# Patient Record
Sex: MALE | Race: WHITE | URBAN - METROPOLITAN AREA
[De-identification: names, ages, dates, MRNs, and addresses within clinical notes are randomized per-mention and may not be internally consistent; named-entity substitution may affect disease eponyms.]

---

## 2017-02-14 ENCOUNTER — TRANSFERRED RECORDS (OUTPATIENT)
Dept: HEALTH INFORMATION MANAGEMENT | Facility: CLINIC | Age: 47
End: 2017-02-14

## 2017-02-24 ENCOUNTER — DOCUMENTATION ONLY (OUTPATIENT)
Dept: GASTROENTEROLOGY | Facility: CLINIC | Age: 47
End: 2017-02-24

## 2017-02-24 NOTE — PROGRESS NOTES
[facility]  Transfer Triage Note    Date of call: February 24, 2017  Time of call: 8:08 AM    Reason for Transfer: Procedure can be done here and not at referring hospital    Diagnosis: Necrotizing pancreatitis c/b sepsis, peritonitis, JUAN     Outside Records: Not available  Additional records requested to be faxed to [phone].    Stability of Patient: Patient is at risk for instability, however patient requires urgent transfer and does not meet ICU criteria     Expected Time of Arrival for Transfer: 0-8 hours    Recommendations for Management and Stabilization: Given    Additional Comments:  46-year-old man with a history of gallstone pancreatitis (initial episode 10/2016) c/b necrosis, complex fluid collections. Please see triage notes from Javier Silveira and Dejuan from 2/15 and 2/17 for additional details. The patient has been in/out of the hospital, most recently admitted on 2/14 in [place]. The case was reviewed with [facility] last week. On the initial triage call (2/15), the decision was made to keep the patient in [place] as it did not seem the fluid collections were amenable to intervention. However, in recent days, the patient's clinical status has deteriorated. He is newly febrile with a rising WBC count. Still spiking fevers while on meropenem. Recent diagnostic paracentesis showed evidence of peritonitis. I spoke with the [facility] hospitalist (Dr. Curtis) to discuss transfer. Currently, the patient is febrile with HR 110s, SBP 130s. He is stable on 2L NC. There was some initial IVF resuscitation but this was stopped in the past day due to concern for volume overload and pulmonary edema. The patient received a dose of lasix yesterday for this reason.     I also discussed the case with Dr. Casanova this morning. He will need to review the most recent imaging in order to decide whether an endoscopic intervention is indicated. I called Dr. Curtis back; he will be  pushing the most recent imaging to our PACS system at which time Dr. Casanova will review the case and make a decision.     This morning it was also discovered that the patient does not have health insurance and is on a self-pay status. We attempted a financial override but were informed that a hospitalization at [facility] would not be covered by insurance. The patient also is above the threshold for medicaid.     Case was discussed with Dr. Coy (AOD). Plan is for images to be pushed, Dr. Casanova to review them, then a decision to be made as to what additional interventions could be performed here at [facility]. If there is something we can offer the patient outside of the care provided at his current hospital we would accept the patient.        Ferny Mayes MD

## 2017-02-24 NOTE — PROGRESS NOTES
Computed tomography imaging from 2/23 reviewed. Demonstrates ill-formed necrotizing pancreatitis with multifocal immature collections of fat and pancreatic necrosis. Some areas also demonstrate small foci of air suspicious for infection. Moreover, there is impressive ascites, which I was told was infected though I am not sure if this is rich in amylase. The current physicians note that the patient is febrile and has a leukocytosis despite broad spectrum antibiotics. Apparently, the patient is not hypotense or tachycardic.    If he was an inpatient in our facility, it seems as if he would be on the floor, receiving similar care as he is currently with the exception that we would tap his ascites dry. We are, however, at the cusp of needing to drain the larger pockets of necrosis, likely by multiple percutaneous drains by interventional radiology +/- endoluminal drainage. He would be fed by the NJ. Per report ARF is improving and hes not ventilated.    In summary then, the need to transfer is a difficult decision, however as he is teetering on decompensation (per report), though not actively decompensating, it would be reasonable and prudent to transfer (with the limited data in hand). That said, hindsight may prove that a transfer was not required.    Serafin Casanova MD PhD   of Medicine    [facility]  Division of Gastroenterology and Hepatology  Interventional and Therapeutic Endoscopy  [facility] [address]  [address] [address]    Office [phone]  Fax [phone]

## 2017-02-25 ENCOUNTER — HOSPITAL ENCOUNTER (INPATIENT)
Facility: CLINIC | Age: 47
LOS: 21 days | Discharge: HOME | DRG: 871 | End: 2017-03-18
Attending: INTERNAL MEDICINE | Admitting: HOSPITALIST

## 2017-02-25 DIAGNOSIS — K85.90 ACUTE PANCREATITIS, UNSPECIFIED COMPLICATION STATUS, UNSPECIFIED PANCREATITIS TYPE: Primary | ICD-10-CM

## 2017-02-25 DIAGNOSIS — G47.01 INSOMNIA DUE TO MEDICAL CONDITION: ICD-10-CM

## 2017-02-25 DIAGNOSIS — F32.0 MILD SINGLE CURRENT EPISODE OF MAJOR DEPRESSIVE DISORDER (H): ICD-10-CM

## 2017-02-25 LAB
ALBUMIN SERPL-MCNC: 1.4 G/DL (ref 3.4–5)
ALP SERPL-CCNC: 90 U/L (ref 40–150)
ALT SERPL W P-5'-P-CCNC: 25 U/L (ref 0–70)
ANION GAP SERPL CALCULATED.3IONS-SCNC: 8 MMOL/L (ref 3–14)
AST SERPL W P-5'-P-CCNC: 30 U/L (ref 0–45)
BILIRUB SERPL-MCNC: 0.4 MG/DL (ref 0.2–1.3)
BUN SERPL-MCNC: 33 MG/DL (ref 7–30)
CALCIUM SERPL-MCNC: 7.1 MG/DL (ref 8.5–10.1)
CHLORIDE SERPL-SCNC: 107 MMOL/L (ref 94–109)
CO2 SERPL-SCNC: 28 MMOL/L (ref 20–32)
CREAT SERPL-MCNC: 1.84 MG/DL (ref 0.66–1.25)
CRP SERPL-MCNC: 180 MG/L (ref 0–8)
ERYTHROCYTE [DISTWIDTH] IN BLOOD BY AUTOMATED COUNT: 16.3 % (ref 10–15)
GFR SERPL CREATININE-BSD FRML MDRD: 40 ML/MIN/1.7M2
GLUCOSE SERPL-MCNC: 95 MG/DL (ref 70–99)
HCT VFR BLD AUTO: 29.9 % (ref 40–53)
HGB BLD-MCNC: 9.7 G/DL (ref 13.3–17.7)
MCH RBC QN AUTO: 26.7 PG (ref 26.5–33)
MCHC RBC AUTO-ENTMCNC: 32.4 G/DL (ref 31.5–36.5)
MCV RBC AUTO: 82 FL (ref 78–100)
PLATELET # BLD AUTO: 449 10E9/L (ref 150–450)
POTASSIUM SERPL-SCNC: 3.8 MMOL/L (ref 3.4–5.3)
PROT SERPL-MCNC: 5.6 G/DL (ref 6.8–8.8)
RBC # BLD AUTO: 3.63 10E12/L (ref 4.4–5.9)
SODIUM SERPL-SCNC: 142 MMOL/L (ref 133–144)
WBC # BLD AUTO: 25.3 10E9/L (ref 4–11)

## 2017-02-25 PROCEDURE — 36415 COLL VENOUS BLD VENIPUNCTURE: CPT | Performed by: STUDENT IN AN ORGANIZED HEALTH CARE EDUCATION/TRAINING PROGRAM

## 2017-02-25 PROCEDURE — 84145 PROCALCITONIN (PCT): CPT | Performed by: HOSPITALIST

## 2017-02-25 PROCEDURE — 12000006 ZZH R&B IMCU INTERMEDIATE UMMC

## 2017-02-25 PROCEDURE — 36415 COLL VENOUS BLD VENIPUNCTURE: CPT | Performed by: HOSPITALIST

## 2017-02-25 PROCEDURE — 86140 C-REACTIVE PROTEIN: CPT | Performed by: HOSPITALIST

## 2017-02-25 PROCEDURE — 85027 COMPLETE CBC AUTOMATED: CPT | Performed by: HOSPITALIST

## 2017-02-25 PROCEDURE — 25000128 H RX IP 250 OP 636: Performed by: STUDENT IN AN ORGANIZED HEALTH CARE EDUCATION/TRAINING PROGRAM

## 2017-02-25 PROCEDURE — 87040 BLOOD CULTURE FOR BACTERIA: CPT | Performed by: STUDENT IN AN ORGANIZED HEALTH CARE EDUCATION/TRAINING PROGRAM

## 2017-02-25 PROCEDURE — 82150 ASSAY OF AMYLASE: CPT | Performed by: HOSPITALIST

## 2017-02-25 PROCEDURE — 80053 COMPREHEN METABOLIC PANEL: CPT | Performed by: HOSPITALIST

## 2017-02-25 PROCEDURE — 83690 ASSAY OF LIPASE: CPT | Performed by: HOSPITALIST

## 2017-02-25 PROCEDURE — 86140 C-REACTIVE PROTEIN: CPT | Performed by: STUDENT IN AN ORGANIZED HEALTH CARE EDUCATION/TRAINING PROGRAM

## 2017-02-25 PROCEDURE — 85610 PROTHROMBIN TIME: CPT | Performed by: STUDENT IN AN ORGANIZED HEALTH CARE EDUCATION/TRAINING PROGRAM

## 2017-02-25 PROCEDURE — 83605 ASSAY OF LACTIC ACID: CPT | Performed by: HOSPITALIST

## 2017-02-25 RX ORDER — NALOXONE HYDROCHLORIDE 0.4 MG/ML
.1-.4 INJECTION, SOLUTION INTRAMUSCULAR; INTRAVENOUS; SUBCUTANEOUS
Status: DISCONTINUED | OUTPATIENT
Start: 2017-02-25 | End: 2017-03-18 | Stop reason: HOSPADM

## 2017-02-25 RX ORDER — SODIUM CHLORIDE 9 MG/ML
INJECTION, SOLUTION INTRAVENOUS CONTINUOUS
Status: DISCONTINUED | OUTPATIENT
Start: 2017-02-25 | End: 2017-02-27

## 2017-02-25 RX ORDER — MEROPENEM 1 G/1
1 INJECTION, POWDER, FOR SOLUTION INTRAVENOUS EVERY 8 HOURS
Status: DISCONTINUED | OUTPATIENT
Start: 2017-02-25 | End: 2017-02-28

## 2017-02-25 RX ORDER — OXYCODONE AND ACETAMINOPHEN 5; 325 MG/1; MG/1
1 TABLET ORAL EVERY 4 HOURS PRN
Status: DISCONTINUED | OUTPATIENT
Start: 2017-02-25 | End: 2017-03-05

## 2017-02-25 RX ORDER — FUROSEMIDE 10 MG/ML
20 INJECTION INTRAMUSCULAR; INTRAVENOUS ONCE
Status: DISCONTINUED | OUTPATIENT
Start: 2017-02-25 | End: 2017-02-25

## 2017-02-25 RX ORDER — HYDROMORPHONE HYDROCHLORIDE 1 MG/ML
.5-1 INJECTION, SOLUTION INTRAMUSCULAR; INTRAVENOUS; SUBCUTANEOUS
Status: DISCONTINUED | OUTPATIENT
Start: 2017-02-25 | End: 2017-03-05

## 2017-02-25 RX ORDER — LANOLIN ALCOHOL/MO/W.PET/CERES
3 CREAM (GRAM) TOPICAL
Status: DISCONTINUED | OUTPATIENT
Start: 2017-02-25 | End: 2017-03-03

## 2017-02-25 RX ORDER — FUROSEMIDE 10 MG/ML
20 INJECTION INTRAMUSCULAR; INTRAVENOUS DAILY
Status: DISCONTINUED | OUTPATIENT
Start: 2017-02-26 | End: 2017-02-26

## 2017-02-25 RX ADMIN — SODIUM CHLORIDE: 9 INJECTION, SOLUTION INTRAVENOUS at 23:49

## 2017-02-25 ASSESSMENT — PAIN DESCRIPTION - DESCRIPTORS: DESCRIPTORS: PRESSURE

## 2017-02-25 NOTE — IP AVS SNAPSHOT
[address] [address]    Phone:  [phone]                                       After Visit Summary   2/25/2017    Jayson Bella    MRN: [id number]           After Visit Summary Signature Page     I have received my discharge instructions, and my questions have been answered. I have discussed any challenges I see with this plan with the nurse or doctor.    ..........................................................................................................................................  Patient/Patient Representative Signature      ..........................................................................................................................................  Patient Representative Print Name and Relationship to Patient    ..................................................               ................................................  Date                                            Time    ..........................................................................................................................................  Reviewed by Signature/Title    ...................................................              ..............................................  Date                                                            Time

## 2017-02-25 NOTE — IP AVS SNAPSHOT
MRN:[id number]                      After Visit Summary   2/25/2017    Jayson Bella    MRN: [id number]           Thank you!     Thank you for choosing [facility] for your care. Our goal is always to provide you with excellent care. Hearing back from our patients is one way we can continue to improve our services. Please take a few minutes to complete the written survey that you may receive in the mail after you visit with us. Thank you!        Patient Information     Date Of Birth          1970        About your hospital stay     You were admitted on:  February 25, 2017 You last received care in the:  [address] [facility]    You were discharged on:  March 18, 2017        Reason for your hospital stay       You were in the hospital for necrotizing pancreatitis.                  Who to Call     For medical emergencies, please call 911.  For non-urgent questions about your medical care, please call your primary care provider or clinic, [phone]  For questions related to your surgery, please call your surgery clinic        Attending Provider     Provider Specialty    Avelino Cm MD Internal Medicine    Emilia Rose MD Internal Medicine    Leighton, MD Yovany Internal Medicine    Belia Bell MD Internal Medicine       Primary Care Provider Office Phone # Fax #    Frkhrz Tay [phone]       [facility] [address] [address]         When to contact your care team       Return to the emergency room if you have fever, chills, pain, vomiting,  bloody stools or vomiting blood.                  After Care Instructions     Activity       Your activity upon discharge: activity as tolerated and no driving for today            Diet       Follow this diet upon discharge: Orders Placed This Encounter      Calorie Counts      Snacks/Supplements Adult: Ensure Clear; Between Meals      Snacks/Supplements Adult: Magic Cup; Between Meals      Low Fat Diet    "         Monitor and record       Please keep a log of your caloric intake.                  Follow-up Appointments     Adult [facility] Follow-up and recommended labs and tests       Follow up for repeat necrosectomy on Tuesday, March 21.    Appointments on [place] and/or [facility] (with [facility] or [facility] provider or service). Call [phone] if you haven't heard regarding these appointments within 7 days of discharge.                  Your next 10 appointments already scheduled     Mar 21, 2017   Procedure with Pawel Lamb MD   [facility], [place], Same Day Surgery (--)    [address]  [place] [address] [phone]              Pending Results     Date and Time Order Name Status Description    3/18/2017 0702 Basic metabolic panel In process     3/14/2017 1437 IR Visceral Angiogram Preliminary     3/2/2017 0928 Fungus Culture, non-blood Preliminary             Statement of Approval     Ordered          03/18/17 1205  I have reviewed and agree with all the recommendations and orders detailed in this document.  EFFECTIVE NOW     Approved and electronically signed by:  Elizabeth Rivera MD             Admission Information     Date & Time Provider Department Dept. Phone    2/25/2017 Belia Bell MD [address] [facility] [place] [phone]      Your Vitals Were     Blood Pressure Pulse Temperature Respirations Height Weight    147/91 83 98.5  F (36.9  C) (Oral) 18 1.905 m (6' 3\") 101.7 kg (224 lb 3.3 oz)    Pulse Oximetry BMI (Body Mass Index)                97% 28.02 kg/m2          MyChart Information     [facility] lets you send messages to your doctor, view your test results, renew your prescriptions, schedule appointments and more. To sign up, go to [email or web address] . Click on \"Log in\" on the left side of the screen, which will take you to the Welcome page. Then click on \"Sign up Now\" on the right side of the page.     You will be asked to enter the access code listed below, as well as some " personal information. Please follow the directions to create your username and password.     Your access code is: [email or web address]  Expires: 2017  3:22 PM     Your access code will  in 90 days. If you need help or a new code, please call your [place] clinic or [phone].        Care EveryWhere ID     This is your Care EveryWhere ID. This could be used by other organizations to access your [place] medical records  [id number]           Review of your medicines      START taking        Dose / Directions    acetaminophen 500 MG tablet   Commonly known as:  TYLENOL        Dose:  500-1000 mg   Take 1-2 tablets (500-1,000 mg) by mouth every 6 hours as needed for mild pain or fever   Quantity:  30 tablet   Refills:  1       amylase-lipase-protease 78719 UNITS Cpep per EC capsule   Commonly known as:  CREON 24        Dose:  3 capsule   Take 3 capsules (72,000 Units) by mouth 3 times daily (with meals)   Quantity:  180 capsule   Refills:  3       ciprofloxacin 500 MG tablet   Commonly known as:  CIPRO   Indication:  Infection Within the Abdomen        Dose:  500 mg   Take 1 tablet (500 mg) by mouth 2 times daily   Quantity:  14 tablet   Refills:  0       fluconazole 200 MG tablet   Commonly known as:  DIFLUCAN   Indication:  Presence of Candida Fungus in the Blood        Dose:  200 mg   Take 1 tablet (200 mg) by mouth daily   Quantity:  7 tablet   Refills:  0       linezolid 600 MG tablet   Commonly known as:  ZYVOX   Indication:  Infection Within the Abdomen        Dose:  600 mg   Take 1 tablet (600 mg) by mouth every 12 hours   Quantity:  14 tablet   Refills:  0       melatonin 3 MG tablet   Used for:  Insomnia due to medical condition        Dose:  3 mg   Take 1 tablet (3 mg) by mouth At Bedtime   Quantity:  30 tablet   Refills:  1       metroNIDAZOLE 500 MG tablet   Commonly known as:  FLAGYL   Indication:  Infection Within the Abdomen        Dose:  500 mg   Take 1 tablet (500 mg) by mouth 3 times daily    Quantity:  21 tablet   Refills:  0       mirtazapine 7.5 MG Tabs tablet   Commonly known as:  REMERON        Dose:  7.5 mg   Take 1 tablet (7.5 mg) by mouth At Bedtime   Quantity:  30 tablet   Refills:  3       multivitamin, therapeutic with minerals Tabs tablet        Dose:  1 tablet   Take 1 tablet by mouth daily   Quantity:  30 each   Refills:  3       ondansetron 4 MG tablet   Commonly known as:  ZOFRAN        Dose:  4 mg   Take 1 tablet (4 mg) by mouth every 6 hours as needed for nausea or vomiting   Quantity:  20 tablet   Refills:  1       pantoprazole 40 MG EC tablet   Commonly known as:  PROTONIX        Dose:  40 mg   Take 1 tablet (40 mg) by mouth 2 times daily (before meals)   Quantity:  30 tablet   Refills:  1            Where to get your medicines      These medications were sent to [facility] [place], [place] - [address] [address]     Phone:  [phone]     acetaminophen 500 MG tablet    amylase-lipase-protease 35789 UNITS Cpep per EC capsule    ciprofloxacin 500 MG tablet    fluconazole 200 MG tablet    linezolid 600 MG tablet    melatonin 3 MG tablet    metroNIDAZOLE 500 MG tablet    mirtazapine 7.5 MG Tabs tablet    multivitamin, therapeutic with minerals Tabs tablet    ondansetron 4 MG tablet    pantoprazole 40 MG EC tablet                Protect others around you: Learn how to safely use, store and throw away your medicines at www.disposemymeds.org.             Medication List: This is a list of all your medications and when to take them. Check marks below indicate your daily home schedule. Keep this list as a reference.      Medications           Morning Afternoon Evening Bedtime As Needed    acetaminophen 500 MG tablet   Commonly known as:  TYLENOL   Take 1-2 tablets (500-1,000 mg) by mouth every 6 hours as needed for mild pain or fever   Last time this was given:  1,000 mg on 3/18/2017 11:11 AM                            As needed  for mild pain or fever.       amylase-lipase-protease 53556 UNITS Cpep per EC capsule   Commonly known as:  CREON 24   Take 3 capsules (72,000 Units) by mouth 3 times daily (with meals)   Last time this was given:  72,000 Units on 3/17/2017  9:13 AM            With meals       With meals       With meals               ciprofloxacin 500 MG tablet   Commonly known as:  CIPRO   Take 1 tablet (500 mg) by mouth 2 times daily   Last time this was given:  500 mg on 3/18/2017  8:44 AM            8 AM           8 PM               fluconazole 200 MG tablet   Commonly known as:  DIFLUCAN   Take 1 tablet (200 mg) by mouth daily   Last time this was given:  200 mg on 3/18/2017  8:44 AM            8 AM                       linezolid 600 MG tablet   Commonly known as:  ZYVOX   Take 1 tablet (600 mg) by mouth every 12 hours   Last time this was given:  600 mg on 3/18/2017  8:44 AM            8 AM           8 PM               melatonin 3 MG tablet   Take 1 tablet (3 mg) by mouth At Bedtime   Last time this was given:  3 mg on 3/17/2017  8:12 PM                        10 PM           metroNIDAZOLE 500 MG tablet   Commonly known as:  FLAGYL   Take 1 tablet (500 mg) by mouth 3 times daily   Last time this was given:  500 mg on 3/18/2017  7:07 AM            6 AM       2 PM           10 PM           mirtazapine 7.5 MG Tabs tablet   Commonly known as:  REMERON   Take 1 tablet (7.5 mg) by mouth At Bedtime   Last time this was given:  7.5 mg on 3/17/2017 10:08 PM                        10 PM           multivitamin, therapeutic with minerals Tabs tablet   Take 1 tablet by mouth daily   Last time this was given:  1 tablet on 3/17/2017 10:08 PM                        10 PM           ondansetron 4 MG tablet   Commonly known as:  ZOFRAN   Take 1 tablet (4 mg) by mouth every 6 hours as needed for nausea or vomiting   Last time this was given:  4 mg on 3/14/2017  4:58 AM                            As needed for nausea.       pantoprazole 40 MG EC  tablet   Commonly known as:  PROTONIX   Take 1 tablet (40 mg) by mouth 2 times daily (before meals)   Last time this was given:  40 mg on 3/18/2017  8:44 AM            8 AM (before breakfast)           4 PM (before dinner)

## 2017-02-25 NOTE — PROGRESS NOTES
Medicine Triage Note    Updates:    Please refer to earlier note from 2/24 and 2/25 for details about this patient. Patient continues to be febrile and his leukocytosis is worsening. Triage line notified me that Dr. Quevedo from  had accepted this patient to be transferred here for further cares.  Requested step down unit.    Yovany CORDOBA MD

## 2017-02-26 ENCOUNTER — APPOINTMENT (OUTPATIENT)
Dept: ULTRASOUND IMAGING | Facility: CLINIC | Age: 47
DRG: 871 | End: 2017-02-26
Attending: HOSPITALIST

## 2017-02-26 ENCOUNTER — APPOINTMENT (OUTPATIENT)
Dept: GENERAL RADIOLOGY | Facility: CLINIC | Age: 47
DRG: 871 | End: 2017-02-26
Attending: HOSPITALIST

## 2017-02-26 ENCOUNTER — APPOINTMENT (OUTPATIENT)
Dept: PHYSICAL THERAPY | Facility: CLINIC | Age: 47
DRG: 871 | End: 2017-02-26
Attending: HOSPITALIST

## 2017-02-26 ENCOUNTER — APPOINTMENT (OUTPATIENT)
Dept: GENERAL RADIOLOGY | Facility: CLINIC | Age: 47
DRG: 871 | End: 2017-02-26
Attending: INTERNAL MEDICINE

## 2017-02-26 LAB
ALBUMIN UR-MCNC: 30 MG/DL
AMORPH CRY #/AREA URNS HPF: ABNORMAL /HPF
AMYLASE SERPL-CCNC: 577 U/L (ref 30–110)
ANION GAP SERPL CALCULATED.3IONS-SCNC: 5 MMOL/L (ref 3–14)
APPEARANCE UR: ABNORMAL
BACTERIA SPEC CULT: NORMAL
BACTERIA SPEC CULT: NORMAL
BILIRUB UR QL STRIP: NEGATIVE
BUN SERPL-MCNC: 33 MG/DL (ref 7–30)
CALCIUM SERPL-MCNC: 7.2 MG/DL (ref 8.5–10.1)
CHLORIDE SERPL-SCNC: 107 MMOL/L (ref 94–109)
CO2 SERPL-SCNC: 29 MMOL/L (ref 20–32)
COLOR UR AUTO: YELLOW
CREAT SERPL-MCNC: 1.83 MG/DL (ref 0.66–1.25)
ERYTHROCYTE [DISTWIDTH] IN BLOOD BY AUTOMATED COUNT: 16.6 % (ref 10–15)
GFR SERPL CREATININE-BSD FRML MDRD: 40 ML/MIN/1.7M2
GLUCOSE SERPL-MCNC: 87 MG/DL (ref 70–99)
GLUCOSE UR STRIP-MCNC: NEGATIVE MG/DL
HCT VFR BLD AUTO: 28 % (ref 40–53)
HGB BLD-MCNC: 8.7 G/DL (ref 13.3–17.7)
HGB UR QL STRIP: ABNORMAL
KETONES UR STRIP-MCNC: NEGATIVE MG/DL
LACTATE BLD-SCNC: 0.6 MMOL/L (ref 0.7–2.1)
LEUKOCYTE ESTERASE UR QL STRIP: NEGATIVE
LIPASE SERPL-CCNC: 1070 U/L (ref 73–393)
MAGNESIUM SERPL-MCNC: 1.9 MG/DL (ref 1.6–2.3)
MCH RBC QN AUTO: 26 PG (ref 26.5–33)
MCHC RBC AUTO-ENTMCNC: 31.1 G/DL (ref 31.5–36.5)
MCV RBC AUTO: 84 FL (ref 78–100)
MICRO REPORT STATUS: NORMAL
MICRO REPORT STATUS: NORMAL
MUCOUS THREADS #/AREA URNS LPF: PRESENT /LPF
NITRATE UR QL: NEGATIVE
PH UR STRIP: 5.5 PH (ref 5–7)
PHOSPHATE SERPL-MCNC: 3.9 MG/DL (ref 2.5–4.5)
PLATELET # BLD AUTO: 489 10E9/L (ref 150–450)
POTASSIUM SERPL-SCNC: 3.9 MMOL/L (ref 3.4–5.3)
PROCALCITONIN SERPL-MCNC: 0.43 NG/ML
RBC # BLD AUTO: 3.34 10E12/L (ref 4.4–5.9)
RBC #/AREA URNS AUTO: 9 /HPF (ref 0–2)
SODIUM SERPL-SCNC: 141 MMOL/L (ref 133–144)
SP GR UR STRIP: 1.02 (ref 1–1.03)
SPECIMEN SOURCE: NORMAL
SPECIMEN SOURCE: NORMAL
SQUAMOUS #/AREA URNS AUTO: <1 /HPF (ref 0–1)
URN SPEC COLLECT METH UR: ABNORMAL
UROBILINOGEN UR STRIP-MCNC: 2 MG/DL (ref 0–2)
WBC # BLD AUTO: 21 10E9/L (ref 4–11)
WBC #/AREA URNS AUTO: 9 /HPF (ref 0–2)

## 2017-02-26 PROCEDURE — 84100 ASSAY OF PHOSPHORUS: CPT | Performed by: STUDENT IN AN ORGANIZED HEALTH CARE EDUCATION/TRAINING PROGRAM

## 2017-02-26 PROCEDURE — 25000132 ZZH RX MED GY IP 250 OP 250 PS 637: Performed by: INTERNAL MEDICINE

## 2017-02-26 PROCEDURE — 25000128 H RX IP 250 OP 636: Performed by: INTERNAL MEDICINE

## 2017-02-26 PROCEDURE — 12000006 ZZH R&B IMCU INTERMEDIATE UMMC

## 2017-02-26 PROCEDURE — 85027 COMPLETE CBC AUTOMATED: CPT | Performed by: STUDENT IN AN ORGANIZED HEALTH CARE EDUCATION/TRAINING PROGRAM

## 2017-02-26 PROCEDURE — 25000125 ZZHC RX 250: Performed by: HOSPITALIST

## 2017-02-26 PROCEDURE — 97110 THERAPEUTIC EXERCISES: CPT | Mod: GP | Performed by: PHYSICAL THERAPIST

## 2017-02-26 PROCEDURE — 99233 SBSQ HOSP IP/OBS HIGH 50: CPT | Mod: GC | Performed by: INTERNAL MEDICINE

## 2017-02-26 PROCEDURE — 87040 BLOOD CULTURE FOR BACTERIA: CPT | Performed by: HOSPITALIST

## 2017-02-26 PROCEDURE — 25000132 ZZH RX MED GY IP 250 OP 250 PS 637: Performed by: STUDENT IN AN ORGANIZED HEALTH CARE EDUCATION/TRAINING PROGRAM

## 2017-02-26 PROCEDURE — 40000940 XR CHEST PORT 1 VW

## 2017-02-26 PROCEDURE — 80048 BASIC METABOLIC PNL TOTAL CA: CPT | Performed by: STUDENT IN AN ORGANIZED HEALTH CARE EDUCATION/TRAINING PROGRAM

## 2017-02-26 PROCEDURE — 97161 PT EVAL LOW COMPLEX 20 MIN: CPT | Mod: GP | Performed by: PHYSICAL THERAPIST

## 2017-02-26 PROCEDURE — 87086 URINE CULTURE/COLONY COUNT: CPT | Performed by: INTERNAL MEDICINE

## 2017-02-26 PROCEDURE — 36415 COLL VENOUS BLD VENIPUNCTURE: CPT | Performed by: STUDENT IN AN ORGANIZED HEALTH CARE EDUCATION/TRAINING PROGRAM

## 2017-02-26 PROCEDURE — 76770 US EXAM ABDO BACK WALL COMP: CPT

## 2017-02-26 PROCEDURE — 40000193 ZZH STATISTIC PT WARD VISIT: Performed by: PHYSICAL THERAPIST

## 2017-02-26 PROCEDURE — 97530 THERAPEUTIC ACTIVITIES: CPT | Mod: GP | Performed by: PHYSICAL THERAPIST

## 2017-02-26 PROCEDURE — 25000128 H RX IP 250 OP 636: Performed by: STUDENT IN AN ORGANIZED HEALTH CARE EDUCATION/TRAINING PROGRAM

## 2017-02-26 PROCEDURE — 81001 URINALYSIS AUTO W/SCOPE: CPT | Performed by: HOSPITALIST

## 2017-02-26 PROCEDURE — 71010 XR CHEST PORT 1 VW: CPT

## 2017-02-26 PROCEDURE — 83735 ASSAY OF MAGNESIUM: CPT | Performed by: STUDENT IN AN ORGANIZED HEALTH CARE EDUCATION/TRAINING PROGRAM

## 2017-02-26 PROCEDURE — 25000128 H RX IP 250 OP 636: Performed by: HOSPITALIST

## 2017-02-26 PROCEDURE — 27210436 ZZH NUTRITION PRODUCT SEMIELEM INTERMED CAN

## 2017-02-26 RX ORDER — FUROSEMIDE 10 MG/ML
40 INJECTION INTRAMUSCULAR; INTRAVENOUS DAILY
Status: DISCONTINUED | OUTPATIENT
Start: 2017-02-27 | End: 2017-03-01

## 2017-02-26 RX ORDER — HEPARIN SODIUM 5000 [USP'U]/.5ML
5000 INJECTION, SOLUTION INTRAVENOUS; SUBCUTANEOUS EVERY 12 HOURS
Status: DISCONTINUED | OUTPATIENT
Start: 2017-02-26 | End: 2017-03-02

## 2017-02-26 RX ORDER — SODIUM BICARBONATE 325 MG/1
325 TABLET ORAL EVERY 4 HOURS
Status: DISCONTINUED | OUTPATIENT
Start: 2017-02-26 | End: 2017-03-06

## 2017-02-26 RX ORDER — HEPARIN SODIUM 10000 [USP'U]/100ML
0-3500 INJECTION, SOLUTION INTRAVENOUS CONTINUOUS
Status: DISCONTINUED | OUTPATIENT
Start: 2017-02-26 | End: 2017-02-26

## 2017-02-26 RX ORDER — NICOTINE 21 MG/24HR
1 PATCH, TRANSDERMAL 24 HOURS TRANSDERMAL DAILY
Status: DISCONTINUED | OUTPATIENT
Start: 2017-02-26 | End: 2017-03-18 | Stop reason: HOSPADM

## 2017-02-26 RX ADMIN — SODIUM CHLORIDE: 9 INJECTION, SOLUTION INTRAVENOUS at 11:25

## 2017-02-26 RX ADMIN — OXYCODONE HYDROCHLORIDE AND ACETAMINOPHEN 1 TABLET: 5; 325 TABLET ORAL at 04:03

## 2017-02-26 RX ADMIN — PANCRELIPASE 24000 UNITS: 24000; 76000; 120000 CAPSULE, DELAYED RELEASE PELLETS ORAL at 19:22

## 2017-02-26 RX ADMIN — OXYCODONE HYDROCHLORIDE AND ACETAMINOPHEN 1 TABLET: 5; 325 TABLET ORAL at 00:32

## 2017-02-26 RX ADMIN — MEROPENEM 1 G: 1 INJECTION, POWDER, FOR SOLUTION INTRAVENOUS at 00:32

## 2017-02-26 RX ADMIN — OXYCODONE HYDROCHLORIDE AND ACETAMINOPHEN 1 TABLET: 5; 325 TABLET ORAL at 12:22

## 2017-02-26 RX ADMIN — FUROSEMIDE 20 MG: 10 INJECTION, SOLUTION INTRAVENOUS at 08:14

## 2017-02-26 RX ADMIN — NICOTINE 1 PATCH: 14 PATCH, EXTENDED RELEASE TRANSDERMAL at 21:50

## 2017-02-26 RX ADMIN — MEROPENEM 1 G: 1 INJECTION, POWDER, FOR SOLUTION INTRAVENOUS at 16:02

## 2017-02-26 RX ADMIN — PANCRELIPASE 72000 UNITS: 24000; 76000; 120000 CAPSULE, DELAYED RELEASE PELLETS ORAL at 15:00

## 2017-02-26 RX ADMIN — VANCOMYCIN HYDROCHLORIDE 2000 MG: 100 INJECTION, POWDER, LYOPHILIZED, FOR SOLUTION INTRAVENOUS at 11:27

## 2017-02-26 RX ADMIN — MELATONIN TAB 3 MG 3 MG: 3 TAB at 00:32

## 2017-02-26 RX ADMIN — MEROPENEM 1 G: 1 INJECTION, POWDER, FOR SOLUTION INTRAVENOUS at 08:18

## 2017-02-26 RX ADMIN — HEPARIN SODIUM 5000 UNITS: 5000 INJECTION, SOLUTION INTRAVENOUS; SUBCUTANEOUS at 16:01

## 2017-02-26 RX ADMIN — OXYCODONE HYDROCHLORIDE AND ACETAMINOPHEN 1 TABLET: 5; 325 TABLET ORAL at 08:12

## 2017-02-26 RX ADMIN — MULTIVITAMIN 15 ML: LIQUID ORAL at 14:59

## 2017-02-26 RX ADMIN — OXYCODONE HYDROCHLORIDE AND ACETAMINOPHEN 1 TABLET: 5; 325 TABLET ORAL at 21:51

## 2017-02-26 RX ADMIN — SODIUM BICARBONATE 325 MG: 325 TABLET ORAL at 19:22

## 2017-02-26 RX ADMIN — NICOTINE 1 PATCH: 14 PATCH, EXTENDED RELEASE TRANSDERMAL at 02:42

## 2017-02-26 RX ADMIN — SODIUM BICARBONATE 325 MG: 325 TABLET ORAL at 15:00

## 2017-02-26 ASSESSMENT — PAIN DESCRIPTION - DESCRIPTORS
DESCRIPTORS: PRESSURE
DESCRIPTORS: ACHING;DISCOMFORT

## 2017-02-26 NOTE — PHARMACY-CONSULT NOTE
Pharmacy Tube Feeding Consult    Medication reviewed for administration by feeding tube and for potential food/drug interactions.    Recommendation: No changes are needed at this time.     Pharmacy will continue to follow as new medications are ordered.

## 2017-02-26 NOTE — PROGRESS NOTES
Pharmacy Vancomycin Initial Note  Date of Service 2017  Patient's  1970  46 year old, male    Indication: Bacteremia    Current estimated CrCl = Estimated Creatinine Clearance: 73 mL/min (based on Cr of 1.83).    Creatinine for last 3 days  2017: 10:06 PM Creatinine 1.84 mg/dL  2017:  6:01 AM Creatinine 1.83 mg/dL    Recent Vancomycin Level(s) for last 3 days  No results found for requested labs within last 72 hours.      Vancomycin IV Administrations (past 72 hours)      No vancomycin orders with administrations in past 72 hours.                Nephrotoxins and other renal medications (Future)    Start     Dose/Rate Route Frequency Ordered Stop    17 0800  furosemide (LASIX) injection 20 mg      20 mg Intravenous DAILY 17 2336      17 0700  vancomycin (VANCOCIN) 2,500 mg in NaCl 0.9 % 500 mL intermittent infusion      2,500 mg Intravenous EVERY 24 HOURS 17 0646            Contrast Orders - past 72 hours     None                Plan:  1.  Start vancomycin  2500 mg IV q24h.   2.  Goal Trough Level: 15-20 mg/L   3.  Pharmacy will check trough levels as appropriate in 1-3 Days.    4. Serum creatinine levels will be ordered daily for the first week of therapy and at least twice weekly for subsequent weeks.    5. [place]olt method utilized to dose vancomycin therapy: Method 1    Bebeto Cat

## 2017-02-26 NOTE — PLAN OF CARE
Problem: Individualization  Goal: Patient Preferences     Patient alert and oriented x 4 but appearing sleep deprived, tired. He was up in the chair for a couple hours twice this shift, otherwise slept between interruptions. Merrim and Vancomycin given this morning. He is NPO but taking ice chips. NJ feeding tube patent and tube feeding (Impact Peptide 1.5) was started at 1500 at 25 ml/hr. This should be increased to 45 ml/hr at 2300. Normal saline is infusing at 100 ml/hr. He has pitting edema in his lower extremities and flank areas. Left chest tube was pulled at 1300. The site was covered with vaseline gauze and a pressure dressing. His wife, family and several friends have been in and out visiting all day. Percocet was given every four hours for mid abdominal pain and he reports it helps cut the pain adequately.      Refer to doc flow sheets, MAR and notes for other specifics. Continue nursing cares per care plan and notify doctors of concerns or changes.

## 2017-02-26 NOTE — PLAN OF CARE
Problem: Goal Outcome Summary  Goal: Goal Outcome Summary  OT 6B: Orders received, chart review completed. Per PT pt limited 2/2 fatigue. Will hold OT at this time.

## 2017-02-26 NOTE — PHARMACY-VANCOMYCIN DOSING SERVICE
Pharmacy Vancomycin Initial Note  Date of Service 2017  Patient's  1970  46 year old, male    Indication: Bacteremia    Current estimated CrCl = Estimated Creatinine Clearance: 73 mL/min (based on Cr of 1.83).    Creatinine for last 3 days  2017: 10:06 PM Creatinine 1.84 mg/dL  2017:  6:01 AM Creatinine 1.83 mg/dL      Nephrotoxins and other renal medications (Future)    Start     Dose/Rate Route Frequency Ordered Stop    17 0830  vancomycin (VANCOCIN) 2,000 mg in NaCl 0.9 % 500 mL intermittent infusion      2,000 mg Intravenous EVERY 12 HOURS 17 0819      17 0800  furosemide (LASIX) injection 20 mg      20 mg Intravenous DAILY 17 6959                Plan:  1.  Start vancomycin  2000 mg IV q12h.   2.  Goal Trough Level: 15-20 mg/L   3.  Pharmacy will check trough levels as appropriate in 1-3 Days.    4. Serum creatinine levels will be ordered daily for the first week of therapy and at least twice weekly for subsequent weeks.    5. [place] method utilized to dose vancomycin therapy: Method 1    Richard Acuña, AgnieszkaD

## 2017-02-26 NOTE — H&P
"  INTERNAL MEDICINE HISTORY AND PHYSICAL    Patient Name: Jayson Bella MRN# [id number]   Age: 46 year old YOB: 1970     Date of Admission:2/25/2017    Primary care provider: No primary care provider on file.  Date of Service: 2/25/2017  Admitting Team: Ted Peacock         Chief Complaint:   Abdominal pain, fever         HPI:     Jayson Bella is a 46 year old male with PMH of gallstone pancreatitis s/p cholecystectomy on 12/18/2016, complicated by pseudocyst compressing bile duct s/p ERCP on 1/11/2017 who presents from OSH with sepsis 2/2 necrotizing pancreatitis.    History gathered from chart review and patient interview with wife and sister present. Patient originally presented to OSH with acute pancreatitis on 12/13/16 and was conservatively managed and discharged. He then presented back to hospital with jaundice in 1/17 due to pseudocyst compressing bile duct s/p ERCP. Per wife, since January, patient has had continued pain and anorexia. Patient was most recently hospitalized on 2/14/16 after presenting to outside GI clinic with generalized weakness and abdominal pain. Patient was started on meropenem while inpatient, but clinically deteriorated on 2/23 with increasing WBC and fever. At this time, a thoracentesis and paracentesis were performed. Records of both are incomplete. Per Dr. Mayes's note, paracentesis showed \"evidence of peritonitis.\" Patient was transferred to [facility] for ERCP vs IR drainage of intraabdominal fluid.     On ROS, patient endorsed abdominal pain, watery stool BID, SOB, fever, anorexia, and recent urinary retention. Per wife patient had lost 71 lb since 12/16 but since 2/14 admission had gained 40 lb in fluid. Patient denied CP, melena, hematochezia, dysuria, cough, HA.         Past Medical History:   Acute pancreatitis         Past Surgical History:   ERCP W/BX 12/18/2016     ERCP-ENDO RETRO INSERT STENT 1/12/2017     ERCP-SPNINCLEROTOMY/PAPILLOTOM 12/18/2016     LAPAROSCOPY " "CHOLECYSTECTOMY PART 1 OF 2 12/18/2016           Social History:       Smoking status: Current Every Day Smoker   Packs/day: 1.00   Years: 25.00   Start date: 1992     Smokeless tobacco: Never Used     Alcohol use not current however extensive EtOH use in past including 12 beers per day for significant time  Comment: quit Sept 6, 2015   Denied illicit drug use         Family History:   Breast Cancer in his sister and sister; Heart Disease in his father; Other in his mother; Prostate Cancer in his father         Immunizations:     There is no immunization history on file for this patient.           Allergies:      Allergies   Allergen Reactions     Penicillins Swelling and Rash            Medications:     None             Review of Systems:     A complete ROS was performed and is negative other than what is stated in the HPI.          Physical Exam:   Blood pressure 151/63, temperature 99.2  F (37.3  C), temperature source Oral, resp. rate 18, height 1.905 m (6' 3\"), weight 126.6 kg (279 lb 3.2 oz), SpO2 95 %.    General: Pt lying in bed. Uncomfortable. NAD. gross anasarca  HEENT: PERRLA; EOMI; head atraumatic, no rhinorrhea or congestion; no oral lesions, throat without exudate and non-erythematous, dry MM   Neck: No masses/abnormalities. JVP elevated at the level of mandible   Chest/Resp: decreased air movement bilaterally without crackles  Heart/CV: Regular rate and rhthym with normal S1 and S2 without murmur/rubs/gallops.  Back: L posterior infrascapular chest pain in place c/d/i  Abdomen/GI: Soft; diffuse tenderness to palpation, distended,  bowel sounds mute; tympanic to percussion  /Rectal: Not examined.   Skin: No rashes or cyanosis, not jaundiced.  Extremities: 4+ BLE; moving all extremities;  Neurological: Grossly intact; AOx3. Strength 5/5 in upper and 4/5 lower extremities, sensation intact.         Data:     DIAGNOSTIC STUDIES  ROUTINE  LABS (Last four results)  [facility]    Recent Labs  Lab 02/25/17  2206 "      POTASSIUM 3.8   CHLORIDE 107   CO2 28   ANIONGAP 8   GLC 95   BUN 33*   CR 1.84*   GFRESTIMATED 40*   GFRESTBLACK 48*   YAN 7.1*   PROTTOTAL 5.6*   ALBUMIN 1.4*   BILITOTAL 0.4   ALKPHOS 90   AST 30   ALT 25     CBC    Recent Labs  Lab 02/25/17  2206   WBC 25.3*   RBC 3.63*   HGB 9.7*   HCT 29.9*   MCV 82   MCH 26.7   MCHC 32.4   RDW 16.3*        Tubes/Lines/Devices:  Peripheral IV Left Hand (Active)   Site Assessment [facility] 2/25/2017  9:14 PM   Line Status Saline locked 2/25/2017  9:14 PM   Phlebitis Scale 0-->no symptoms 2/25/2017  9:14 PM   Infiltration Scale 0 2/25/2017  9:14 PM   Number of days:       Peripheral IV Right Hand (Active)   Site Assessment [facility] 2/25/2017  9:14 PM   Line Status Saline locked 2/25/2017  9:14 PM   Phlebitis Scale 0-->no symptoms 2/25/2017  9:14 PM   Infiltration Scale 0 2/25/2017  9:14 PM   Number of days:     CT Abdomen 2/24/17 OSH records  1. Redemonstrated diffuse mesenteric edema, scattered ascites with likely upper abdomen loculations, and peripancreatic edema. 2 pancreatic parenchymal fluid collections noted at the body and tail of the pancreas appear similar to comparison. No new peripancreatic gas to suggest necrosis. Findings consistent with sequela of pancreatitis.    2. Severe bowel wall thickening of multiple loops of small bowel in the anterior abdominal midline as described above. No mechanical obstruction demonstrated on this exam. No free air.    3. Bibasilar atelectasis or infiltrate with trace right and small left pleural effusions. The left pleural effusion is decreased compared to prior study.         Assessment and Plan:     Jayson Bella is a 46 year old male with PMH of gallstone pancreatitis s/p cholecystectomy on 12/18/2016, complicated by pseudocyst compressing bile duct s/p ERCP on 1/11/2017 who presents from OSH with sepsis 2/2 necrotizing pancreatitis.    #Sepsis  Sepsis met (WBC, tachycardia, fever). qSOFA 0/3. Patient on meropenem but continued  to spike fever due to inadequate source control. This is likely due to necrotizing pancreatitis. Other differentials include SBP, pulmonary infection.   - BCx pending  - Start meropenem    #Necrotizing pancreatitis  Transferred from OSH for possible ERCP vs IR guided drain. Therapeutic management depends on the results of CT abdomen. Have spoken with radiology. We only have CT abdomen from 2/23 in PACS and unfortunately do not carry the most recent one on 2/24. As patient has an JUAN, additional testing without IV contrast would likely not yield more information.   - Radiology over-read of OSH CT abdomen on 2/23   - GI consulted, appreciate guidance in the management of this complicated patient     #JUAN  Baseline 0.6. Differentials are broad, favors ATN as a result of progression of prerenal etiologies. Effective arterial volume reduced in the setting of sepsis and low oncotic pressure. Could also consider post obstructive causes as patient has a recent history of urinary retention. Consider FEUrea if Cr does not improve with diuresis.   - UA  - Renal U/S  - Daily BMP     #Hypervolemia  2/2 Sepsis and poor oncotic pressure. Per wife, patient has gained 40 lb since admission to OSH on 2/13/17. Now with diffuse mesenteric edema and severe small bowel wall thickening. Patient diuresis naive. Had been diuresed with lasix 4mg IV qDay at OSH. Due to concerns for gut edema, will continue IV formulation at higher dose.   - Lasix 20mg IV now  - Strict I/O, daily weight, stafford (patient with urinary retention)     #Pleural effusion   S/p chest tube to water seal. Initially drained 1200mL fluid. Analysis concerning for exudative effusion with elevated WBC counts. Light's criteria met (although protein measurement on different dates). Unclear the source of effusion. Consider pancreatitic pleural effusion vs infection vs malignancy.   - Will need to obtain records from OSH for fluid Cx  - Consider pulmonary  consult    #Ascites  S/p paracentesis with 500mL of fluid drained. Incomplete records from Care Everywhere. CT showed scattered ascites with loculation suggesting more of a subacute to chronic process.   - Will need to obtain complete records    #Abdominal pain  2/2 necrotizing pancreatitis. Acute pain control via PRN dilaudid to establish baseline need. Will likely require PCA pump for comfort.  - 0.5-1mg Dilaudid q3H PRN    #Diarrhea  C. Diff negative at OSH on 2/25/17. May be 2/2 tube feeds vs pancreatitic insufficiency. Continue to monitor. Consider further work up if indicated in future.      #Generalized weakness  -PT/OT consulted    CODE: FULL  Diet/IVF: NPO, has NJ tube in; nutrition consulted  DVT ppx: mechanical   Disposition/Admission Status: Inpatient pending clinical improvement.    This patient was discussed with Dr. Donohue, who agrees with the assessment and plan.

## 2017-02-26 NOTE — PROGRESS NOTES
" 02/26/17 1030   Quick Adds   Type of Visit Initial PT Evaluation   Living Environment   Lives With spouse;child(darvin), dependent   Living Arrangements house   Home Accessibility stairs to enter home;stairs within home   Number of Stairs to Enter Home 1   Number of Stairs Within Home 1   Stair Railings at Home none   Transportation Available family or friend will provide   Living Environment Comment lives with wife and 2 children (ages 9 and 7), step inside to \"sunken living room\"   Self-Care   Usual Activity Tolerance good   Current Activity Tolerance fair   Regular Exercise no   Equipment Currently Used at Home none   Activity/Exercise/Self-Care Comment IND with ADL's   Functional Level Prior   Ambulation 0-->independent   Transferring 0-->independent   Toileting 0-->independent   Bathing 0-->independent   Dressing 0-->independent   Eating 0-->independent   Communication 0-->understands/communicates without difficulty   Swallowing 0-->swallows foods/liquids without difficulty   Cognition 0 - no cognition issues reported   Fall history within last six months no   Which of the above functional risks had a recent onset or change? none   Prior Functional Level Comment IND with ADL's, does not work, active at baseline   General Information   Onset of Illness/Injury or Date of Surgery - Date 02/25/17   Referring Physician Avelino Cm MD   Patient/Family Goals Statement wants to return home   Pertinent History of Current Problem (include personal factors and/or comorbidities that impact the POC) 46 year old male with PMH of gallstone pancreatitis s/p cholecystectomy on 12/18/2016, complicated by pseudocyst compressing bile duct s/p ERCP on 1/11/2017 who presents from OSH with sepsis 2/2 necrotizing pancreatitis   General Observations pleasant and agreeable   General Info Comments activity: up with assist   Cognitive Status Examination   Orientation orientation to person, place and time   Level of " Consciousness alert   Follows Commands and Answers Questions 100% of the time   Personal Safety and Judgment intact   Memory intact   Integumentary/Edema   Integumentary/Edema Comments BLE edema noted   Posture    Posture Forward head position;Protracted shoulders   Range of Motion (ROM)   ROM Quick Adds No deficits were identified   Strength   Strength Comments Gross BLE strength WFL. Pt presenting with generalized weakness/deconditioning   Bed Mobility   Bed Mobility Comments SBA   Transfer Skills   Transfer Comments CGA   Gait   Gait Comments pt ambulated 10ft using IV pole with CGA provided. Decreased gait speed, flexed posture, and shuffled gait pattern noted.   Balance   Balance Comments mild instability noted with OOB activity   Sensory Examination   Sensory Perception Comments n/a   Coordination   Coordination Comments n/a   Muscle Tone   Muscle Tone Comments n/a   General Therapy Interventions   Planned Therapy Interventions balance training;bed mobility training;gait training;neuromuscular re-education;strengthening;transfer training;risk factor education;home program guidelines;progressive activity/exercise   Clinical Impression   Criteria for Skilled Therapeutic Intervention yes, treatment indicated   PT Diagnosis impaired functional mobility   Influenced by the following impairments decreased strength and endurance, impaired balance   Functional limitations due to impairments difficulty with bed mobility, transfers, and gait   Clinical Presentation Stable/Uncomplicated   Clinical Presentation Rationale pt presenting with generalized weakness/decondioning limiting tolerance to activity   Clinical Decision Making (Complexity) Low complexity   Therapy Frequency` daily   Predicted Duration of Therapy Intervention (days/wks) 3/5/2017   Anticipated Discharge Disposition Home with Assist   Risk & Benefits of therapy have been explained Yes   Patient, Family & other staff in agreement with plan of care Yes  "  [facility] TM \"6 Clicks\"   2016, Trustees of [facility], under license to [employer].  All rights reserved.   6 Clicks Short Forms Basic Mobility Inpatient Short Form   [facility]  \"6 Clicks\" V.2 Basic Mobility Inpatient Short Form   1. Turning from your back to your side while in a flat bed without using bedrails? 3 - A Little   2. Moving from lying on your back to sitting on the side of a flat bed without using bedrails? 3 - A Little   3. Moving to and from a bed to a chair (including a wheelchair)? 3 - A Little   4. Standing up from a chair using your arms (e.g., wheelchair, or bedside chair)? 3 - A Little   5. To walk in hospital room? 3 - A Little   6. Climbing 3-5 steps with a railing? 2 - A Lot   Basic Mobility Raw Score (Score out of 24.Lower scores equate to lower levels of function) 17   Total Evaluation Time   Total Evaluation Time (Minutes) 8     "

## 2017-02-26 NOTE — CONSULTS
GASTROENTEROLOGY CONSULT  DATE OF SERVICE: 2/26/2017  PHYSICIAN REQUESTING CONSULT: Ольга  PATIENT MRN: [id number]  Reason for Consultation: Necrotizing pancreatitis    ASSESSMENT: Acute necrotizing pancreatitis with summer-pancreatic acute fluid collections/walled off necrosis. Also with ascites present and although cell count not suggestive of peritonitis, could be 2/2 volume overload or could be pancreatic ascites. Imaging not conclusive, but timeframe for presence of acute fluid collections dates back approximately 4-6 weeks. The majority of his fluid collections appear to be present either in the low pelvis or directly summer-pancreatic, potentially making RP drain placement more difficult. Although on most recent CT, there does not appear to be fully organized WON, the lack of contrast limits our ability to assess this (especially given these have likely been present since at least mid/late January. At this time, the patient is clinically stable and there is not an urgent indication for either percutaneous or endoscopic intervention.     RECOMMENDATIONS:  -Nutrition consult, re-start TFs  -DVT prophy (prefer heparin were procedure to be needed)  -Full cultures   -Repeat paracentesis when able and please send full work-up including amylase/lipase   -Consider nephrology consultation if renal function does not improve.  -PT/OT consults  -Reinforce day/night to avoid delirium  -Out of bed QID  -Minimize narcotics as able.  -Will review imaging in upcoming days and further recs will come at that time.  -Were he to decompensate (high fevers, abdominal pain, clinical worsening reasonably attributable to worsening pancreatitis), please contact on-call GI and the decision to intervene one way or another will likely be expedited. Would also consider addition of anti-fungal coverage with micafungin if he is not improving.  -Agree with antibiotics, choice per primary service.    The patient was seen, discussed, and plan  "agreed upon with Dr. Quevedo, attending gastroenterologist.    Jun Kassandra  Gastroenterology Fellow  __________________________________________________________________________________  HPI: Mr. Bella is a 46 year old male with past history depression, anxiety, tobacco abuse, past EtOH abuse, recent history significant for presumed gallstone pancreatitis s/p ERCP with biliary stent placement and laparoscopic cholecystectomy in 12/2016 with subsequent development of necrotizing pancreatitis who presents in consultation for management of the same. History is obtained primarily from wife, secondarily from additional family present as patient is unable to participate in interview (sleeping). Based on the the families description (and confirmed on chart review to the extent it is available), he first had abdominal pain, nausea and vomiting in mid December. He was found to have elevated LFTs and lipase and thus underwent ERCP with findings of CBD sludge-no stones-and had a biliary stent placed. He subsequently discharged home and continued to have abdominal pain, nausea and vomiting and additionally developed jaundice. He again presented to OSH on 1/9 and was found to have acute pancreatitis with what were at that time deemed \"multiple pseudocysts\" (which upon review appear to be most consistent with acute necrotic collections) and ultimately underwent repeat ERCP (due to elevated LFTs) for presumed CBD compression from the acute necrotic collections (documentation is 7cm 10f stent-unclear what type) and he was discharged after being noted to tolerate a liquid diet. A week later he was seen in primary care clinic as he continued to do quite poorly with appetite and pain. At that time he was noted to have lost approximately 60#. He was given percocet and a RUQ US was ordered. Although documentation is limited to [facility] availability, he did have an ultrasound on 1/24 that revealed ascites and again revealed " complex summer-pancreatic fluid collections. His next medical encounter was 2/13 with his PCP where it was again noted the patient was having difficulty eating due to significant pain and was given zofran for nausea and an outpatient GI referral was placed. The patient was seen the following day in GI clinic and at that time, he had baseline labs drawn that showed significant leukocytosis, elevated creatinine and was admitted to the hospital given concern for sepsis. He had a repeat CT that showed complex appearing ascites with concern for early loculation, L sided pleural effusion and summer-pancreatic necrosis. Throughout his time at OSH, he has been managed conservatively with post-pyloric feeding and more recently has been on broad spectrum antibiotics (meropenem since 2/14 and vancomycin more recently than that). He had a paracentesis and thoracentesis that showed a total of 65 neutrophils with 223 total nucleated cells and a thoracentesis that showed a total of 42 neutrophils with 1152 total nucleated cells present.  500 ml was taken off with paracentesis. He has not had any additional endoscopic interventions or drains placed (with exception of small caliber chest tube placed yesterday). His hospitalization was complicated by volume overload requiring IV diuresis, acute renal failure (presumed combination YARITZA and ATN) and immobility. He was apparently tolerating tube feeds (post-pyloric) without difficulty. He was placed on DVT prophylaxis. He has not had nausea or vomiting that has been noted, and at this time, there is not active concern for gastric outlet obstruction. All CT scans, with the exception of yesterdays non-contrast imaging, have been uploaded to our system and are available for review. He has not had any GI bleeding. He has been somewhat confused, which family attributes to narcotic intoxication. He has not been out of bed except to walk to bathroom or use bedside commode.    PMHx: Reviewed,  "pertinent portioned mentioned in HPI    PSHx:   I have reviewed this patient's past surgical history and commented on sigificant events within the HPI    MEDS:  No current facility-administered medications on file prior to encounter.   No current outpatient prescriptions on file prior to encounter.    ALLERGIES:    Allergies   Allergen Reactions     Penicillins Swelling and Rash       FHx:  I have reviewed this patient's family history and commented on sigificant items within the HPI    SOCIAL Hx:  Social History     Social History     Marital status: N/A     Spouse name: N/A     Number of children: N/A     Years of education: N/A     Occupational History     Not on file.     Social History Main Topics     Smoking status: Not on file     Smokeless tobacco: Not on file     Alcohol use Not on file     Drug use: Not on file     Sexual activity: Not on file     Other Topics Concern     Not on file     Social History Narrative       ROS: A comprehensive 10 pt Review of Systems was asked and answered in the negative unless specifically commented upon in the HPI    Physical Exam  Vitals: /83 (BP Location: Right arm)  Temp 97.8  F (36.6  C) (Oral)  Resp 17  Ht 1.905 m (6' 3\")  Wt 126.6 kg (279 lb 3.2 oz)  SpO2 94%  BMI 34.9 kg/m2  BMI= Body mass index is 34.9 kg/(m^2).  Gen: sleeping, resting comfortably, appears chronically ill but in NAD  HEENT: No scleral icterus or conjunctival injection.   LAD: No anterior/posterior cervical LAD.   CV:  Borderline tachy, RR, no overt murmurs  Lungs: L chest tube in place, nothing draining. Bibasilar crackles   Abd: +bs, soft, diffuse TTP, + distended.  No hepatomegaly.  No guarding/rigidity/rebound.  Skin: no jaundice, no stigmata of chronic liver disease  Ext: warm, dry, no evidence of edema  Neuro: No asterixis. No focal deficits noted  MSK: Normal muscle tone      LABS:  [facility]  Recent Labs  Lab 02/26/17  0601 02/25/17  2206    142   POTASSIUM 3.9 3.8   CHLORIDE 107 107 "   YAN 7.2* 7.1*   CO2 29 28   BUN 33* 33*   CR 1.83* 1.84*   GLC 87 95     CBC  Recent Labs  Lab 02/26/17  0601 02/25/17 2206   WBC 21.0* 25.3*   RBC 3.34* 3.63*   HGB 8.7* 9.7*   HCT 28.0* 29.9*   MCV 84 82   MCH 26.0* 26.7   MCHC 31.1* 32.4   RDW 16.6* 16.3*   * 449     INRNo lab results found in last 7 days.  LFTs  Recent Labs  Lab 02/25/17 2206   ALKPHOS 90   AST 30   ALT 25   BILITOTAL 0.4   PROTTOTAL 5.6*   ALBUMIN 1.4*      PANC  Recent Labs  Lab 02/25/17 2206   LIPASE 1070*   AMYLASE 577*       IMAGING: Personally reviewed    \

## 2017-02-26 NOTE — PROGRESS NOTES
Pt arrived to 6B accompanied by wife and EMS at this time. Pt settled to room and oriented to unit protocols. Call light within reach. Will continue to monitor.

## 2017-02-26 NOTE — PLAN OF CARE
Problem: Goal Outcome Summary  Goal: Goal Outcome Summary  PT/6B: Initial PT evaluation completed. Pt performing all mobility with SBA-CGA. He ambulated ~10ft within room using IV pole for support, politely declining further ambulation 2/2 fatigue. He participated in seated LE strengthening exercises. Anticipate pt will be able to discharge home with assist pending gait assessment.

## 2017-02-26 NOTE — PROGRESS NOTES
CLINICAL NUTRITION SERVICES - ASSESSMENT NOTE     Nutrition Prescription    RECOMMENDATIONS FOR MDs/PROVIDERS TO ORDER:  Please order K+, Mg++ and Phos IV replacement protocols.  Please note pt has 8Fr FT, high risk for clogging. If frequently clogged, pt may benefit from hourly water flushes or replacing w/ a 10Fr FT. Avoid medications down this tube if able.     Malnutrition Status:    Severe malnutrition in the context of chronic illness    Recommendations already ordered by Registered Dietitian (RD):  1. Once MD approves, begin Impact Peptide @ 25 mL/hr and adv 20 mL q8h to goal 85 ml/hr (2040 ml/day) to provide 3060 kcals (29 kcal/kg), 192 g PRO (1.8 g/kg), 1571 ml free H2O, 131 g Fat (50% from MCTs), 286 g CHO and no Fiber daily.    - 60 mL q4h free water flushes for patency.    - Certavite to ensure adequate micronutrients in case of slow TF adv, EN interruptions, hypermetabolic needs.                          -CRP every Monday to assess approp of immune-enhancing formula    2. Once begin TFs, begin the following pancreatic enzyme regimen and recommend order each of the following:   (please copy and paste administration instructions into each order)  A) Sodium Bicarb tablet (325 mg), 1 tablet q 4 hrs via NJtube. Administration Instructions: Crush 1 tablet and mix into 15 ml of warm water and use this solution to mix with Creon pancreatic enzymes. DO NOT administer directly into GJtube (to be mixed into TF formula with Creon enzyme - see Creon enzyme order)   B) Creon 24, 1- 3 capsules q 4 hrs via NJtube. Administration Instructions: If TF rate is running @ 25-45 ml/hr, administer 1 capsule q 4 hrs; if rate is running @ 55-65 ml/hr, administer 2 capsule q 4 hrsonce TF rate advances 75-85 ml/hr, increase to 3 capsule q 4 hrs.  Open capsule and empty contents into 15 ml sodium bicarb solution (see sodium bicarb order), let dissolve for about 20-30 minutes and then add this solution to the amount of TF formula  "hung in TF bag every 4 hrs (i.e., once TF @ goal infusion 85 ml/hr will mix 3 capsules into 340 ml of TF formula every 4 hrs).   *Note: this enzyme regimen with TF @ goal infusion will provide approx 3298 units of lipase/gram of total Fat daily and approp dosing initially for pancreatic insufficiency with more elemental TF formula.     3. Ordered baseline Phos and Mg++. Ensure K+, Mg++, phos ordered daily while nutrition support advancing. Do not adv if K+, Mg++ < nrml or phos <2. Replace lytes per protocol.       REASON FOR ASSESSMENT  Jayson Bella is a/an 46 year old male assessed by the dietitian for Admission Nutrition Risk Screen for tube feeding or parenteral nutrition and Provider Order - Registered Dietitian to Assess and Order TF per Medical Nutrition Therapy Protocol    NUTRITION HISTORY  PMH of gallstone pancreatitis s/p cholecystectomy on 12/18/2016, complicated by pseudocyst compressing bile duct s/p ERCP on 1/11/2017 who presents from OSH with sepsis 2/2 necrotizing pancreatitis. Since January, pt has had abdominal pain and anorexia.    Per Care everywhere notes, NJT was placed on 2/17 and later replaced on 2/20 d/t pt pulling out FT. Pt's OSH regimen was Osmolite 1.5 @ 75 mL/he to provide 2700 kcals (26 kcal/kg), 113 grams protein (1.1 g/kg), 1371 ml water in tube feeding formula and 100 ml water flushes QID. Pt was placed NPO during this admission.    Pt's wife states pt was having oily stools/diarrhea at OSH. Pt was tolerating TF well @ goal 75 mL/hr with no nausea, however did not tolerate free water flushes >60 mL. TF was running @ goal up until transfer.     CURRENT NUTRITION ORDERS  Diet: NPO  Intake/Tolerance: No TFs started or ordered yet.     LABS  Labs reviewed    No phos or Mg++ checked this admission (also none available in care everywhere)    MEDICATIONS  Medications reviewed  Lasix    ANTHROPOMETRICS  Height: 190.5 cm (6' 3\")  Most Recent Weight: 126.6 kg (279 lb 3.2 oz) (SCALE #1)  "   IBW: 89 kg  BMI: Overweight BMI 25-29.9 - Per dry wt of 105.3 kg BMI = 29 kg/m^2  Weight History: Wt hx per Care everywhere - Per this data pt has lost 28.5 kg (~63# or 21%) in the past 2 months.     2/14/17: 105.3 kg  2/13/17: 106.2 kg  1/9/17: 115.9 kg  12/18/16: 136.4 kg  12/13/16: 133.8 kg     Per wife patient had lost 71 lb since 12/16 but since 2/14 admission had gained 40 lb in fluid.    Dosing Weight: 105 kg (actual last known dry weight from 2/14/17).     ASSESSED NUTRITION NEEDS  Estimated Energy Needs: 2625 - 3150+ kcals/day (25 - 30+ kcals/kg)  Justification: Maintenance, potentially higher needs w/ necrotizing pancreatitis and recent severe weight loss  Estimated Protein Needs: 158 - 210 grams protein/day (1.5 - 2 grams of pro/kg)  Justification: Necrotizing pancreatitis and Hypercatabolism with acute illness  Estimated Fluid Needs: 1 mL/kcal or per MD pending fluid status  Justification: Maintenance    PHYSICAL FINDINGS  See malnutrition section below.    MALNUTRITION  % Intake: Decreased intake likely does not meet criteria with recently starting TF (although previous average TF intakes not available)  % Weight Loss: > 7.5% in 3 months (severe)  Subcutaneous Fat Loss: Facial region, Upper arm and Thoracic/intercostal: mild  Muscle Loss: Facial & jaw region, Thoracic region (clavicle, acromium bone, deltoid, trapezius, pectoral) and Upper arm (bicep, tricep): mild  Fluid Accumulation/Edema: Severe (feet, ankles, legs), Moderate (knees)  Malnutrition Diagnosis: Severe malnutrition in the context of chronic illness    NUTRITION DIAGNOSIS  Inadequate protein-energy intake related to increased needs w/ hypercatabolism 2/2 necrotizing pancreatitis, NPO and no TF ordered this admission but pt has TF access, suspect previous TF regimen @ OSH inadequate as evidenced by no TF currently running x 1-2 days since admission, previous TF only providing 1.1 g/kg protein (with current est needs of 1.5 g/kg), 21% wt  "loss in 2-3 months.      INTERVENTIONS  Implementation  Nutrition Education: Provided education on role of RD. Discussed formula change, adding PERT to TF. Also discussed potentially bridling FT in the future.   Collaboration with other providers - Discussed TF, PERT w/ MD. MD requested RD to put comments \"start once MD approves\" in orders in case MD wants to hold TF start today.  Enteral Nutrition, Feeding tube flush, Multi-trace element supplement therapy  - Initiate above.     Goals  Total avg nutritional intake to meet a minimum of 25 kcal/kg and 1.5 g PRO/kg daily (per dosing wt 105 kg).     Monitoring/Evaluation  Progress toward goals will be monitored and evaluated per protocol.    Zaria Monzon, RD, LD ([id number])  Weekend RD pager: [phone]  Weekday 6B RD Pager: [phone]      "

## 2017-02-27 ENCOUNTER — APPOINTMENT (OUTPATIENT)
Dept: OCCUPATIONAL THERAPY | Facility: CLINIC | Age: 47
DRG: 871 | End: 2017-02-27
Attending: HOSPITALIST

## 2017-02-27 ENCOUNTER — APPOINTMENT (OUTPATIENT)
Dept: GENERAL RADIOLOGY | Facility: CLINIC | Age: 47
DRG: 871 | End: 2017-02-27
Attending: HOSPITALIST

## 2017-02-27 ENCOUNTER — APPOINTMENT (OUTPATIENT)
Dept: PHYSICAL THERAPY | Facility: CLINIC | Age: 47
DRG: 871 | End: 2017-02-27
Attending: INTERNAL MEDICINE

## 2017-02-27 LAB
ANION GAP SERPL CALCULATED.3IONS-SCNC: 7 MMOL/L (ref 3–14)
BACTERIA SPEC CULT: NO GROWTH
BUN SERPL-MCNC: 33 MG/DL (ref 7–30)
CALCIUM SERPL-MCNC: 7.5 MG/DL (ref 8.5–10.1)
CHLORIDE SERPL-SCNC: 108 MMOL/L (ref 94–109)
CO2 SERPL-SCNC: 28 MMOL/L (ref 20–32)
CREAT SERPL-MCNC: 1.68 MG/DL (ref 0.66–1.25)
CRP SERPL-MCNC: 150 MG/L (ref 0–8)
ERYTHROCYTE [DISTWIDTH] IN BLOOD BY AUTOMATED COUNT: 16.6 % (ref 10–15)
GFR SERPL CREATININE-BSD FRML MDRD: 44 ML/MIN/1.7M2
GLUCOSE SERPL-MCNC: 92 MG/DL (ref 70–99)
HCT VFR BLD AUTO: 30 % (ref 40–53)
HGB BLD-MCNC: 9.5 G/DL (ref 13.3–17.7)
LACTATE BLD-SCNC: 0.6 MMOL/L (ref 0.7–2.1)
MAGNESIUM SERPL-MCNC: 2 MG/DL (ref 1.6–2.3)
MCH RBC QN AUTO: 26.5 PG (ref 26.5–33)
MCHC RBC AUTO-ENTMCNC: 31.7 G/DL (ref 31.5–36.5)
MCV RBC AUTO: 84 FL (ref 78–100)
MICRO REPORT STATUS: NORMAL
PHOSPHATE SERPL-MCNC: 3.9 MG/DL (ref 2.5–4.5)
PLATELET # BLD AUTO: 509 10E9/L (ref 150–450)
POTASSIUM SERPL-SCNC: 4 MMOL/L (ref 3.4–5.3)
RBC # BLD AUTO: 3.59 10E12/L (ref 4.4–5.9)
SODIUM SERPL-SCNC: 143 MMOL/L (ref 133–144)
SPECIMEN SOURCE: NORMAL
WBC # BLD AUTO: 16.2 10E9/L (ref 4–11)

## 2017-02-27 PROCEDURE — 25000128 H RX IP 250 OP 636: Performed by: STUDENT IN AN ORGANIZED HEALTH CARE EDUCATION/TRAINING PROGRAM

## 2017-02-27 PROCEDURE — 97116 GAIT TRAINING THERAPY: CPT | Mod: GP

## 2017-02-27 PROCEDURE — 40000135 CT OUTSIDE READ

## 2017-02-27 PROCEDURE — 27210436 ZZH NUTRITION PRODUCT SEMIELEM INTERMED CAN

## 2017-02-27 PROCEDURE — 97165 OT EVAL LOW COMPLEX 30 MIN: CPT | Mod: GO | Performed by: OCCUPATIONAL THERAPIST

## 2017-02-27 PROCEDURE — 36415 COLL VENOUS BLD VENIPUNCTURE: CPT | Performed by: HOSPITALIST

## 2017-02-27 PROCEDURE — 25000128 H RX IP 250 OP 636: Performed by: INTERNAL MEDICINE

## 2017-02-27 PROCEDURE — 36415 COLL VENOUS BLD VENIPUNCTURE: CPT | Performed by: STUDENT IN AN ORGANIZED HEALTH CARE EDUCATION/TRAINING PROGRAM

## 2017-02-27 PROCEDURE — 40000193 ZZH STATISTIC PT WARD VISIT

## 2017-02-27 PROCEDURE — 83605 ASSAY OF LACTIC ACID: CPT | Performed by: HOSPITALIST

## 2017-02-27 PROCEDURE — 25000132 ZZH RX MED GY IP 250 OP 250 PS 637: Performed by: INTERNAL MEDICINE

## 2017-02-27 PROCEDURE — 40000133 ZZH STATISTIC OT WARD VISIT: Performed by: OCCUPATIONAL THERAPIST

## 2017-02-27 PROCEDURE — 86140 C-REACTIVE PROTEIN: CPT | Performed by: STUDENT IN AN ORGANIZED HEALTH CARE EDUCATION/TRAINING PROGRAM

## 2017-02-27 PROCEDURE — 84100 ASSAY OF PHOSPHORUS: CPT | Performed by: STUDENT IN AN ORGANIZED HEALTH CARE EDUCATION/TRAINING PROGRAM

## 2017-02-27 PROCEDURE — 83735 ASSAY OF MAGNESIUM: CPT | Performed by: STUDENT IN AN ORGANIZED HEALTH CARE EDUCATION/TRAINING PROGRAM

## 2017-02-27 PROCEDURE — 97140 MANUAL THERAPY 1/> REGIONS: CPT | Mod: GO | Performed by: OCCUPATIONAL THERAPIST

## 2017-02-27 PROCEDURE — 99233 SBSQ HOSP IP/OBS HIGH 50: CPT | Mod: GC | Performed by: INTERNAL MEDICINE

## 2017-02-27 PROCEDURE — 25000132 ZZH RX MED GY IP 250 OP 250 PS 637: Performed by: STUDENT IN AN ORGANIZED HEALTH CARE EDUCATION/TRAINING PROGRAM

## 2017-02-27 PROCEDURE — 12000006 ZZH R&B IMCU INTERMEDIATE UMMC

## 2017-02-27 PROCEDURE — 80048 BASIC METABOLIC PNL TOTAL CA: CPT | Performed by: STUDENT IN AN ORGANIZED HEALTH CARE EDUCATION/TRAINING PROGRAM

## 2017-02-27 PROCEDURE — 85027 COMPLETE CBC AUTOMATED: CPT | Performed by: STUDENT IN AN ORGANIZED HEALTH CARE EDUCATION/TRAINING PROGRAM

## 2017-02-27 PROCEDURE — 97530 THERAPEUTIC ACTIVITIES: CPT | Mod: GP

## 2017-02-27 RX ADMIN — PANCRELIPASE 24000 UNITS: 24000; 76000; 120000 CAPSULE, DELAYED RELEASE PELLETS ORAL at 00:18

## 2017-02-27 RX ADMIN — NICOTINE 1 PATCH: 14 PATCH, EXTENDED RELEASE TRANSDERMAL at 22:05

## 2017-02-27 RX ADMIN — SODIUM BICARBONATE 325 MG: 325 TABLET ORAL at 04:13

## 2017-02-27 RX ADMIN — HEPARIN SODIUM 5000 UNITS: 5000 INJECTION, SOLUTION INTRAVENOUS; SUBCUTANEOUS at 16:46

## 2017-02-27 RX ADMIN — SODIUM BICARBONATE 325 MG: 325 TABLET ORAL at 08:07

## 2017-02-27 RX ADMIN — HEPARIN SODIUM 5000 UNITS: 5000 INJECTION, SOLUTION INTRAVENOUS; SUBCUTANEOUS at 04:27

## 2017-02-27 RX ADMIN — SODIUM BICARBONATE 325 MG: 325 TABLET ORAL at 12:24

## 2017-02-27 RX ADMIN — OXYCODONE HYDROCHLORIDE AND ACETAMINOPHEN 1 TABLET: 5; 325 TABLET ORAL at 12:24

## 2017-02-27 RX ADMIN — OXYCODONE HYDROCHLORIDE AND ACETAMINOPHEN 1 TABLET: 5; 325 TABLET ORAL at 21:05

## 2017-02-27 RX ADMIN — SODIUM CHLORIDE: 9 INJECTION, SOLUTION INTRAVENOUS at 07:46

## 2017-02-27 RX ADMIN — PANCRELIPASE 48000 UNITS: 24000; 76000; 120000 CAPSULE, DELAYED RELEASE PELLETS ORAL at 08:07

## 2017-02-27 RX ADMIN — SODIUM BICARBONATE 325 MG: 325 TABLET ORAL at 16:46

## 2017-02-27 RX ADMIN — PANCRELIPASE 72000 UNITS: 24000; 76000; 120000 CAPSULE, DELAYED RELEASE PELLETS ORAL at 19:24

## 2017-02-27 RX ADMIN — SODIUM BICARBONATE 325 MG: 325 TABLET ORAL at 23:57

## 2017-02-27 RX ADMIN — FUROSEMIDE 40 MG: 10 INJECTION, SOLUTION INTRAVENOUS at 07:50

## 2017-02-27 RX ADMIN — MEROPENEM 1 G: 1 INJECTION, POWDER, FOR SOLUTION INTRAVENOUS at 00:19

## 2017-02-27 RX ADMIN — SODIUM BICARBONATE 325 MG: 325 TABLET ORAL at 19:25

## 2017-02-27 RX ADMIN — OXYCODONE HYDROCHLORIDE AND ACETAMINOPHEN 1 TABLET: 5; 325 TABLET ORAL at 04:13

## 2017-02-27 RX ADMIN — PANCRELIPASE 72000 UNITS: 24000; 76000; 120000 CAPSULE, DELAYED RELEASE PELLETS ORAL at 23:56

## 2017-02-27 RX ADMIN — MELATONIN TAB 3 MG 3 MG: 3 TAB at 22:05

## 2017-02-27 RX ADMIN — MEROPENEM 1 G: 1 INJECTION, POWDER, FOR SOLUTION INTRAVENOUS at 07:47

## 2017-02-27 RX ADMIN — MULTIVITAMIN 15 ML: LIQUID ORAL at 08:12

## 2017-02-27 RX ADMIN — OXYCODONE HYDROCHLORIDE AND ACETAMINOPHEN 1 TABLET: 5; 325 TABLET ORAL at 08:12

## 2017-02-27 RX ADMIN — PANCRELIPASE 24000 UNITS: 24000; 76000; 120000 CAPSULE, DELAYED RELEASE PELLETS ORAL at 04:14

## 2017-02-27 RX ADMIN — SODIUM BICARBONATE 325 MG: 325 TABLET ORAL at 00:18

## 2017-02-27 RX ADMIN — PANCRELIPASE 48000 UNITS: 24000; 76000; 120000 CAPSULE, DELAYED RELEASE PELLETS ORAL at 12:24

## 2017-02-27 RX ADMIN — MEROPENEM 1 G: 1 INJECTION, POWDER, FOR SOLUTION INTRAVENOUS at 16:46

## 2017-02-27 RX ADMIN — Medication 2 SPRAY: at 19:24

## 2017-02-27 RX ADMIN — MEROPENEM 1 G: 1 INJECTION, POWDER, FOR SOLUTION INTRAVENOUS at 23:57

## 2017-02-27 RX ADMIN — PANCRELIPASE 72000 UNITS: 24000; 76000; 120000 CAPSULE, DELAYED RELEASE PELLETS ORAL at 16:46

## 2017-02-27 ASSESSMENT — PAIN DESCRIPTION - DESCRIPTORS
DESCRIPTORS: ACHING
DESCRIPTORS: DISCOMFORT;PRESSURE
DESCRIPTORS: CRAMPING

## 2017-02-27 NOTE — PROGRESS NOTES
GASTROENTEROLOGY PROGRESS NOTE        ASSESSMENT/ RECOMMENDATIONS:    46 year old male with past history depression, anxiety, tobacco abuse, past EtOH abuse, recent history significant for presumed gallstone pancreatitis (12/14/2016) s/p ERCP w sphincterotomy with biliary sludge (12/17/17), laparoscopic cholecystectomy (12/18/17), repeat ERCP with biliary stent (7 cm x 10 F ) for elevated LFT (1/12/17) transferred here from [place], [place]) for further management of necrotizing pancreatitis with acute necrotic collections. Although on most recent CT abdomen (2/24/17), there does not appear to be fully organized collection, the lack of contrast limits our ability to assess this. At this time, the patient is clinically stable with improving leukocytosis and there is not an urgent indication for either percutaneous or endoscopic intervention.   Severe necrotizing pancreatitis is complicated by acute necrotic collections, acute kidney injury, ascites and pleural effusions s/p chest tube. Recent CT abdomen did not show any gas in collection therefore unlikely to be infected, but since patient had worsening leukocytosis and SIRS with no identified sepsis source, will continue antibiotics empirically for presumed infection.       RECOMMENDATIONS:    Continue tube feeds through NJ tube. Ok with clear liquid diet.     Repeat paracentesis when able and please send full work-up including amylase/lipase.    Consider nephrology consultation if renal function does not improve.    No endoscopic intervention currently as no mature collection at this point. Will consider imaging if any clinical deterioration or early next week.     Agree with empiric antibiotics, choice per primary service, currently on meropenem. Would also consider addition of anti-fungal coverage with micafungin if he is not improving.    DVT prophylaxis (prefer heparin were procedure to be needed).      The patient was discussed and plan  "agreed upon with GI staff, Dr. Quevedo.     Tenzinanoop Alvaro  [facility]  Gastroenterology Fellow  _______________________________________________________________    S: pain well controlled, denied any nausea or vomiting, no chest pain, mild SOB present. No melena or hematochezia.     O:  Blood pressure 134/89, temperature 98  F (36.7  C), temperature source Oral, resp. rate 18, height 1.905 m (6' 3\"), weight 126.6 kg (279 lb 3.2 oz), SpO2 95 %.    Gen: no distress  HEENT: no icterus, NJ present.   CV: normal S1, S2, no tachycardia.   Lungs: CTAB  Abd: soft, mild tenderness in mid and RUQ  Neuro: no focal motor deficits      LABS:  BMP  Recent Labs  Lab 02/27/17  0626 02/26/17  0601 02/25/17 2206    141 142   POTASSIUM 4.0 3.9 3.8   CHLORIDE 108 107 107   YAN 7.5* 7.2* 7.1*   CO2 28 29 28   BUN 33* 33* 33*   CR 1.68* 1.83* 1.84*   GLC 92 87 95     CBC  Recent Labs  Lab 02/27/17  0626 02/26/17  0601 02/25/17 2206   WBC 16.2* 21.0* 25.3*   RBC 3.59* 3.34* 3.63*   HGB 9.5* 8.7* 9.7*   HCT 30.0* 28.0* 29.9*   MCV 84 84 82   MCH 26.5 26.0* 26.7   MCHC 31.7 31.1* 32.4   RDW 16.6* 16.6* 16.3*   * 489* 449     INRNo lab results found in last 7 days.  LFTs  Recent Labs  Lab 02/25/17 2206   ALKPHOS 90   AST 30   ALT 25   BILITOTAL 0.4   PROTTOTAL 5.6*   ALBUMIN 1.4*      PANC  Recent Labs  Lab 02/25/17 2206   LIPASE 1070*   AMYLASE 577*       CT abdomen w/o contrast 2/24/2017          "

## 2017-02-27 NOTE — PROGRESS NOTES
[facility]   Internal Medicine Progress Note     Patient Name: Jayson Bella   : 1970, Age: 46 year old  Admission Date: 2017   Primary Team: Maroon 3     Assessment & Plan     47 yo M with hx of gallstone pancreatitis s/p cholecystectomy in 2016 c/b biliary obstruction due to large pseudocyst s/p ERCP with stent placement 2017 who was transferred from OSH for sepsis, fever, severe leukocytosis concerning for necrotizing pancreatitis.    Changes today:  - start CLD  - CT from outside hospital  is now in system  - Await further recs from GI    # necrotizing pancreatitis, previously septic  # summer-pancreatic acute fluid collections/walled off necrosis  - , Lipase 1070, Procal 0.43, peak WBC 29.2 on  at OSH, paracentesis at OSH with counts not suggestive of peritonitis  - continue meropenem  - CT abdomen imagining from  and  now in system  - blood Cx collected while pt on chio & vanc (after transfer)  - GI consult, appreciate recs  - monitor for fever, hemodynamic instability, daily CBCs, weekly CRP  - Consider repeat paracentesis- send full w/u including amylase/lipase    # JUAN, likely 2/2 intravascular volume depletion in setting of sepsis, with contribution from low albumin  - baseline 0.6, peak 2.1, currently improving with fluids. continue fluids  cc/hr  - Abd US w/ascites and normal kidneys  - improving with diuresis    # hypervolemia, ascites likely secondary to IVF in setting of sepsis and hypoalbuminemia  - recent weight of 106 kg on   - IV lasix 40 mg IV & monitor response  - strict I&O, stafford due to urinary retention. Start tamsolusin before d/c'ing stafford  - daily weights    # restart tube feeds  # significant weight loss  - C. Diff negative  - pancreatic enzymes  - monitor electrolytes, replete prn    # L sided pleural effusion - chest tube removed   - per chart review, pleural fluid had 1152 nucleated cells, 42 PMNs, LDH  of 183, Protein 2.9, and no growth at 3 days. However, LDH and protein were not checked in the serum in or around the date of paracentesis. If using total protein on admission, protein ratio is 0.52 which fulfills criteria for exudative.   - chest tube removed 2/26, f/u xr without evidence of pneumo    # pain  - pt has had significant AMS with trazodone nd fentanyl.  - minimize opioid use  - percocet prn available    FEN: NJTFs per nutrition, CLD for pleasure  Prophylaxis:  DVT: SCDs, heparin subq    Lung: IS, OOB  Code Status: FULL CODE  Medical Decision Maker: wife    Disposition: pending further evaluation of pancreas and intervention as necessary    Patient seen and discussed with Dr. Yovany Medellin  PGY-3 IM  [phone]      Interval History     No acute events, was able to get some rest last night. Breathing is a little bit labored but feels this is due to him not being up and out of bed. Tolerating tube feeds. Feeling very thirsty. Abd pain largely the same.     ROS: 4 point ROS completed and negative except as noted aboveI.       Vitals, Exam, Data     Physical exam:  Temp:  [97.6  F (36.4  C)-98.4  F (36.9  C)] 98  F (36.7  C)  Heart Rate:  [84-96] 91  Resp:  [18-21] 18  BP: (127-136)/(78-92) 134/89  SpO2:  [95 %-97 %] 95 % on RA    Wt Readings from Last 2 Encounters:   02/25/17 126.6 kg (279 lb 3.2 oz)     Intake/Output Summary (Last 24 hours) at 02/26/17 1918  Last data filed at 02/26/17 1800   Gross per 24 hour   Intake          2706.66 ml   Output             2075 ml   Net           631.66 ml     Physical Exam:   General: Pleasant, resting in bed, no distress  HEENT: mmm, NJ in place  Cardiac: Normal rate, regular rhythm. No m/r/g. Normal S1, S2.  Pulm: decreased at bases, otherwise clear throughout  Abd: Soft, diffusely tender, moderately distended.  Extremities: 1-2+ Bilateral LE edema, wraps in palce  Neuro: A&Ox3, no focal deficits    CBC    Recent Labs  Lab 02/27/17  5226  02/26/17  0601 02/25/17 2206   WBC 16.2* 21.0* 25.3*   HGB 9.5* 8.7* 9.7*   MCV 84 84 82   * 489* 449       BMP    Recent Labs  Lab 02/27/17 0626 02/26/17 0601 02/25/17 2206    141 142   POTASSIUM 4.0 3.9 3.8   CHLORIDE 108 107 107   CO2 28 29 28   ANIONGAP 7 5 8   GLC 92 87 95   BUN 33* 33* 33*   CR 1.68* 1.83* 1.84*   YAN 7.5* 7.2* 7.1*   MAG 2.0 1.9  --    PHOS 3.9 3.9  --         INRNo lab results found in last 7 days.    Liver panel    Recent Labs  Lab 02/25/17 2206   PROTTOTAL 5.6*   ALBUMIN 1.4*   BILITOTAL 0.4   ALKPHOS 90   AST 30   ALT 25

## 2017-02-27 NOTE — PLAN OF CARE
Problem: Goal Outcome Summary  Goal: Goal Outcome Summary  Outcome: No Change  Neuro: A&Ox4.   Cardiac: SR. VSS.       Respiratory: Sating >94% on 2L NC.  GI/: Adequate urine output per stafford. No BM. Abdomen distended/firm.  Diet/appetite: Tolerating TF to NJ at 45 ml/hr, to be advanced at 0800. NPO.   Activity:  Assist of 1, independently positioning in bed.   Pain: At acceptable level on current regimen.    Skin: Intact, no new deficits noted.     R: Continue with POC. Notify primary team with changes.

## 2017-02-27 NOTE — PROGRESS NOTES
02/27/17 0800   Rehab Discipline   Discipline OT   Type of Visit   Type of visit Initial Edema Evaluation   General Information   Start of care 02/27/17   Referring physician Avelino Cm MD   Orders Evaluate and treat as indicated   Order date 02/25/17   Medical diagnosis 47 yo M with hx of gallstone pancreatitis s/p cholecystectomy in 12/2016    Edema onset 02/20/17   Affected body parts RLE;LLE   Edema etiology comments Decreased mobility, low Albumin (1.4).   Surgical / medical history reviewed Yes   Prior treatment Elevation   Living environment House / [facility]   Subjective Report   Patient report of symptoms patient reports LEs feel heavy with movement.    Pain   Pain comments patient reports no pain in legs at this time.    Edema Exam / Assessment   Skin condition Pitting;Dryness;Intact   Pitting 2+   Pitting location MTPs to knee creases; greatest in dorsal feet.    Girth Measurements   Girth Measurements Refer to separate girth measurement flowsheet   Range of Motion   ROM No deficits were identified   Strength   Strength comments (generalized weakness throughout )   Sensory   Sensory perception Light touch   Light touch Intact   Planned Edema Interventions   Planned edema interventions Gradient compression bandaging;Fit for compression garment;Exercises;Precautions to prevent infection / exacerbation;Education   Planned edema intervention comments GCB applied from MTPs to knee creases bilaterally.    Clinical Impression   Criteria for skilled therapeutic intervention met Yes   Therapy diagnosis Recommed use of GCB at this time and will transition to compression stockings when appropriate with fit for long term management of LE edema as needed.    Influenced by the following impairments / conditions Edema   Assessment of Occupational Performance 1-3 Performance Deficits   Identified Performance Deficits Decreased functional mobility    Clinical Decision Making (Complexity) Low complexity    Treatment frequency 5 times / week   Treatment duration 1 weeek    Patient / family and/or staff in agreement with plan of care Yes   Risks and benefits of therapy have been explained Yes   Total Evaluation Time   Total evaluation time 5

## 2017-02-27 NOTE — PROVIDER NOTIFICATION
Spoke with cross cover MD regarding placement of pt's NJ tube. Requested verification that it is okay to use for tube feeding based on today's chest xray at 1433. MD reviewed the xray and confirmed that it is okay to use the tube for feeds.

## 2017-02-27 NOTE — PLAN OF CARE
Problem: Goal Outcome Summary  Goal: Goal Outcome Summary    Has been alert and oriented with Vs stable , needed 2 L  By  NC to keep his O2 > 90% . Had c/o of abdominal pain , took percocet which was effective. TF at 25 ml/hr , pt is tolerating well. Greenberg patent draining well. Wife and family at bedside are involve on his cares. Was up on chair most of this evening .  Pt able to use call light to let his needs known.

## 2017-02-27 NOTE — PLAN OF CARE
Problem: Goal Outcome Summary  Goal: Goal Outcome Summary  Edema 6B: Initial edema evaluation completed. Patient with progressing acute onset LE edema. Skin intact with 2+ pitting distally; greatest in feet/ankles. Application of GCB from MTPs to knee creases for edema management and increased ease with functional mobility. Remove if causing pain/numbness.

## 2017-02-27 NOTE — PLAN OF CARE
Problem: Goal Outcome Summary  Goal: Goal Outcome Summary  Outcome: Improving  Neuro: A&Ox4. Family at bedside. Lethargic at times.  Cardiac: SR with HR in 80-90s. BP stable.     Respiratory: Sating % on RA while awake. When patient sleeps he sats 95% on 2L O2. Slight SOB on exertion.  GI/: Adequate pale urine output via stafford. Lasix given with improved urine output. Cath care completed. Pt passing gas but no bm since 2/25/17.  Diet/appetite: Pt tolerated advancing diet from NPO to clears. No n/v. Advancing TF with enzymes every 8 hours. Tf to be advanced to 85ml/hr at 16:00 today.     Activity:  Assist of 1 with walker, up to halls and chair with PT today.  Pain: Adequate pain control with PRN percocet Q4hr.  Skin: Intact, no new deficits noted. Healing scars on abdomen. Old CT site on L mid-posterior.BLE +3 edema-lymph consulted today and applied ace wrap compressions.     R: Continue with POC. Notify primary team with changes. Cont. IV abx, control pain, and encourage ambulation.

## 2017-02-27 NOTE — PROGRESS NOTES
[facility]   Internal Medicine Progress Note     Patient Name: Jayson Bella   : 1970, Age: 46 year old  Admission Date: 2017   Primary Team: Maroon 3     Assessment & Plan     47 yo M with hx of gallstone pancreatitis s/p cholecystectomy in 2016 c/b biliary obstruction due to large pseudocyst s/p ERCP with stent placement 2017 who was transferred from OSH for sepsis, fever, severe leukocytosis concerning for necrotizing pancreatitis.    Changes today:  - start heparin SQ  - start NJTFs  - removed chest tube  - d/c'd vancomycin  - called OSH and asked them to push imaging from  into PAC system    # necrotizing pancreatitis, previously septic  # summer-pancreatic acute fluid collections/walled off necrosis  - , Lipase 1070, Procal 0.43, peak WBC 29.2 on  at OSH, pericentesis at OSH with counts not suggestive of peritonitis  - continue meropenem, stop vancomycin   - start heparin SQ  - CT abdomen imagining from  in PACS but not the  one  - blood Cx x4 collected while pt on chio & vanc (after transfer)  - hold additional imaging for JUAN  - GI consult, appreciate recs  - per IR, they can do paracentesis but would need imaging from  to evaluate for other interventions  - monitor for fever, hemodynamic instability, daily CBCs, weekly CRP  - repeat paracentesis when able and send full w/u including amylase/lipase    # JUAN, likely 2/2 intravascular volume depletion in setting of sepsis, with contribution from low albumin  - baseline 0.6, peak 2.1, currently improving with fluids. continue fluids  cc/hr  - UA with WBC 9, RBC 9, amorphous crystals. UC pending  - Abd US w/ascites and normal kidneys  - if JUAN does not improve with diuresis by , consider albumin challenge and/or FENUr to evaluate JUAN v ATN    # hypervolemia, ascites likely secondary to IVF in setting of sepsis and hypoalbuminemia  - recent weight of 106 kg on   - IV lasix  20 mg daily with moderate response, will increase dose for AM to 40 mg IV & monitor response  - strict I&O, stafford due to urinary retention. Start tamsolusin before d/c'ing stafford  - daily weights    # restart tube feeds  # significant weight loss  - C. Diff negative  - pt was not receiving pancreatic enzymes with tube feeds at outside hospital but he is receiving it here.  - monitor electrolytes, replete prn    # L sided pleural effusion - chest tube removed today  - per chart review, pleural fluid had 1152 nucleated cells, 42 PMNs, LDH of 183, Protein 2.9, and no growth at 3 days. However, LDH and protein were not checked in the serum in or around the date of paracentesis. If using total protein on admission, protein ratio is 0.52 which fulfills criteria for exudative. However, as his total protein has progressively decreased, must assume equivical or non-exudative at the time. Regardless, no output for several days.  - chest tube removed 2/26, f/u xr without evidence of pneumo    # pain  - pt has had significant AMS with trazodone nd fentanyl.  - minimize opioid use  - percocet prn available    FEN: NJTFs per nutrition, otherwise NPO but ice chips ok, replete prn,  cc/hr with goal of euvolemic  Prophylaxis:  DVT: SCDs, heparin gtt    Lung: IS, OOB  Code Status: FULL CODE  Medical Decision Maker: wife    Disposition: pending further evaluation of pancreas and intervention as necessary    Yared Domingo MD, PhD  PGY-1, Internal Medicine  [phone]    Patient was seen and discussed with attending physician Dr. CORDOBA who agrees with above assessment and plan.      Interval History     Interval History: feeling a little better today than he has recently. No chest pain. Moderate abdominal pain relatively controlled. Weak, fatigue.    ROS: 4 point ROS completed and negative except as noted aboveI.    ALLERGIES: penicllin -> swelling and rash  MEDS: reviewed     Vitals, Exam, Data     Physical exam:  Temp:  [97.6   F (36.4  C)-100.1  F (37.8  C)] 97.6  F (36.4  C)  Heart Rate:  [] 84  Resp:  [17-21] 21  BP: (117-151)/(63-86) 136/78  SpO2:  [93 %-97 %] 96 % on RA    Wt Readings from Last 2 Encounters:   02/25/17 126.6 kg (279 lb 3.2 oz)     Intake/Output Summary (Last 24 hours) at 02/26/17 1918  Last data filed at 02/26/17 1800   Gross per 24 hour   Intake          2706.66 ml   Output             2075 ml   Net           631.66 ml     Physical Exam:   General: Pleasant, in NAD but slow moving and unsteady on feet  HEENT: No Scleral icterus. NCAT, MMM. PERRL, EOMI.  Cardiac: Normal rate, regular rhythm. No m/r/g. Normal S1, S2.  Pulm: shallow breather, no crackles. Normal respiratory effort  Abd: Soft, diffusely tender, distnded.  Skin: No jaundice, No rash  Extremities: 2+ Bilateral LE edema  Joints: No inflammation noted on joints  Neuro: A&Ox3, no focal deficits  Psych: normal mood, full affect    CBC  Recent Labs  Lab 02/26/17  0601 02/25/17  2206   WBC 21.0* 25.3*   HGB 8.7* 9.7*   MCV 84 82   * 449       BMP  Recent Labs  Lab 02/26/17  0601 02/25/17  2206    142   POTASSIUM 3.9 3.8   CHLORIDE 107 107   CO2 29 28   ANIONGAP 5 8   GLC 87 95   BUN 33* 33*   CR 1.83* 1.84*   YAN 7.2* 7.1*   MAG 1.9  --    PHOS 3.9  --         INRNo lab results found in last 7 days.    Liver panel  Recent Labs  Lab 02/25/17 2206   PROTTOTAL 5.6*   ALBUMIN 1.4*   BILITOTAL 0.4   ALKPHOS 90   AST 30   ALT 25

## 2017-02-27 NOTE — PLAN OF CARE
Problem: Goal Outcome Summary  Goal: Goal Outcome Summary  PT 6B: Pt tolerated session well and demo'd much improved tolerance to overall activity this date. Amb bouts of 140', 150' and 150' with walker. Wife present and providing assist as well. Limited by deconditioning. Expect pt to be able to d/c home with assist pending increased gait and stair assessment.

## 2017-02-28 ENCOUNTER — APPOINTMENT (OUTPATIENT)
Dept: OCCUPATIONAL THERAPY | Facility: CLINIC | Age: 47
DRG: 871 | End: 2017-02-28
Attending: INTERNAL MEDICINE

## 2017-02-28 LAB
ALBUMIN SERPL-MCNC: 1.2 G/DL (ref 3.4–5)
ALT SERPL W P-5'-P-CCNC: 16 U/L (ref 0–70)
ANION GAP SERPL CALCULATED.3IONS-SCNC: 6 MMOL/L (ref 3–14)
AST SERPL W P-5'-P-CCNC: 31 U/L (ref 0–45)
BUN SERPL-MCNC: 36 MG/DL (ref 7–30)
CALCIUM SERPL-MCNC: 7.2 MG/DL (ref 8.5–10.1)
CHLORIDE SERPL-SCNC: 103 MMOL/L (ref 94–109)
CO2 SERPL-SCNC: 29 MMOL/L (ref 20–32)
CREAT SERPL-MCNC: 1.44 MG/DL (ref 0.66–1.25)
ERYTHROCYTE [DISTWIDTH] IN BLOOD BY AUTOMATED COUNT: 16.6 % (ref 10–15)
GFR SERPL CREATININE-BSD FRML MDRD: 53 ML/MIN/1.7M2
GLUCOSE SERPL-MCNC: 120 MG/DL (ref 70–99)
HCT VFR BLD AUTO: 32.5 % (ref 40–53)
HGB BLD-MCNC: 10.3 G/DL (ref 13.3–17.7)
LACTATE BLD-SCNC: 0.9 MMOL/L (ref 0.7–2.1)
MAGNESIUM SERPL-MCNC: 1.8 MG/DL (ref 1.6–2.3)
MCH RBC QN AUTO: 26.3 PG (ref 26.5–33)
MCHC RBC AUTO-ENTMCNC: 31.7 G/DL (ref 31.5–36.5)
MCV RBC AUTO: 83 FL (ref 78–100)
PHOSPHATE SERPL-MCNC: 3 MG/DL (ref 2.5–4.5)
PLATELET # BLD AUTO: 538 10E9/L (ref 150–450)
POTASSIUM SERPL-SCNC: 4.2 MMOL/L (ref 3.4–5.3)
PROT SERPL-MCNC: 5.8 G/DL (ref 6.8–8.8)
RBC # BLD AUTO: 3.92 10E12/L (ref 4.4–5.9)
SODIUM SERPL-SCNC: 138 MMOL/L (ref 133–144)
WBC # BLD AUTO: 16.3 10E9/L (ref 4–11)

## 2017-02-28 PROCEDURE — 97140 MANUAL THERAPY 1/> REGIONS: CPT | Mod: GO

## 2017-02-28 PROCEDURE — 25000132 ZZH RX MED GY IP 250 OP 250 PS 637: Performed by: INTERNAL MEDICINE

## 2017-02-28 PROCEDURE — 36415 COLL VENOUS BLD VENIPUNCTURE: CPT | Performed by: STUDENT IN AN ORGANIZED HEALTH CARE EDUCATION/TRAINING PROGRAM

## 2017-02-28 PROCEDURE — 25000132 ZZH RX MED GY IP 250 OP 250 PS 637: Performed by: STUDENT IN AN ORGANIZED HEALTH CARE EDUCATION/TRAINING PROGRAM

## 2017-02-28 PROCEDURE — 84450 TRANSFERASE (AST) (SGOT): CPT | Performed by: STUDENT IN AN ORGANIZED HEALTH CARE EDUCATION/TRAINING PROGRAM

## 2017-02-28 PROCEDURE — 25000128 H RX IP 250 OP 636: Performed by: STUDENT IN AN ORGANIZED HEALTH CARE EDUCATION/TRAINING PROGRAM

## 2017-02-28 PROCEDURE — 27210429 ZZH NUTRITION PRODUCT INTERMEDIATE LITER

## 2017-02-28 PROCEDURE — 80069 RENAL FUNCTION PANEL: CPT | Performed by: STUDENT IN AN ORGANIZED HEALTH CARE EDUCATION/TRAINING PROGRAM

## 2017-02-28 PROCEDURE — 0W9G3ZZ DRAINAGE OF PERITONEAL CAVITY, PERCUTANEOUS APPROACH: ICD-10-PCS | Performed by: INTERNAL MEDICINE

## 2017-02-28 PROCEDURE — 25000128 H RX IP 250 OP 636: Performed by: INTERNAL MEDICINE

## 2017-02-28 PROCEDURE — 27210436 ZZH NUTRITION PRODUCT SEMIELEM INTERMED CAN

## 2017-02-28 PROCEDURE — 99233 SBSQ HOSP IP/OBS HIGH 50: CPT | Mod: GC | Performed by: INTERNAL MEDICINE

## 2017-02-28 PROCEDURE — 84155 ASSAY OF PROTEIN SERUM: CPT | Performed by: STUDENT IN AN ORGANIZED HEALTH CARE EDUCATION/TRAINING PROGRAM

## 2017-02-28 PROCEDURE — 40000133 ZZH STATISTIC OT WARD VISIT

## 2017-02-28 PROCEDURE — 85027 COMPLETE CBC AUTOMATED: CPT | Performed by: STUDENT IN AN ORGANIZED HEALTH CARE EDUCATION/TRAINING PROGRAM

## 2017-02-28 PROCEDURE — 83735 ASSAY OF MAGNESIUM: CPT | Performed by: STUDENT IN AN ORGANIZED HEALTH CARE EDUCATION/TRAINING PROGRAM

## 2017-02-28 PROCEDURE — 83605 ASSAY OF LACTIC ACID: CPT | Performed by: INTERNAL MEDICINE

## 2017-02-28 PROCEDURE — 84460 ALANINE AMINO (ALT) (SGPT): CPT | Performed by: STUDENT IN AN ORGANIZED HEALTH CARE EDUCATION/TRAINING PROGRAM

## 2017-02-28 PROCEDURE — 12000008 ZZH R&B INTERMEDIATE UMMC

## 2017-02-28 PROCEDURE — 36415 COLL VENOUS BLD VENIPUNCTURE: CPT | Performed by: INTERNAL MEDICINE

## 2017-02-28 RX ORDER — AMOXICILLIN 250 MG
1 CAPSULE ORAL 2 TIMES DAILY
Status: DISCONTINUED | OUTPATIENT
Start: 2017-02-28 | End: 2017-03-01

## 2017-02-28 RX ORDER — PANTOPRAZOLE SODIUM 40 MG/1
40 TABLET, DELAYED RELEASE ORAL EVERY MORNING
Status: DISCONTINUED | OUTPATIENT
Start: 2017-02-28 | End: 2017-03-07

## 2017-02-28 RX ADMIN — Medication 2 SPRAY: at 16:44

## 2017-02-28 RX ADMIN — SODIUM BICARBONATE 325 MG: 325 TABLET ORAL at 04:00

## 2017-02-28 RX ADMIN — PANTOPRAZOLE SODIUM 40 MG: 40 TABLET, DELAYED RELEASE ORAL at 09:11

## 2017-02-28 RX ADMIN — OXYCODONE HYDROCHLORIDE AND ACETAMINOPHEN 1 TABLET: 5; 325 TABLET ORAL at 23:34

## 2017-02-28 RX ADMIN — PANCRELIPASE 72000 UNITS: 24000; 76000; 120000 CAPSULE, DELAYED RELEASE PELLETS ORAL at 16:42

## 2017-02-28 RX ADMIN — MEROPENEM 1 G: 1 INJECTION, POWDER, FOR SOLUTION INTRAVENOUS at 08:53

## 2017-02-28 RX ADMIN — HYDROMORPHONE HYDROCHLORIDE 1 MG: 10 INJECTION, SOLUTION INTRAMUSCULAR; INTRAVENOUS; SUBCUTANEOUS at 11:50

## 2017-02-28 RX ADMIN — HYDROMORPHONE HYDROCHLORIDE 1 MG: 10 INJECTION, SOLUTION INTRAMUSCULAR; INTRAVENOUS; SUBCUTANEOUS at 18:11

## 2017-02-28 RX ADMIN — HYDROMORPHONE HYDROCHLORIDE 1 MG: 10 INJECTION, SOLUTION INTRAMUSCULAR; INTRAVENOUS; SUBCUTANEOUS at 15:19

## 2017-02-28 RX ADMIN — OXYCODONE HYDROCHLORIDE AND ACETAMINOPHEN 1 TABLET: 5; 325 TABLET ORAL at 14:01

## 2017-02-28 RX ADMIN — OXYCODONE HYDROCHLORIDE AND ACETAMINOPHEN 1 TABLET: 5; 325 TABLET ORAL at 05:44

## 2017-02-28 RX ADMIN — SENNOSIDES AND DOCUSATE SODIUM 1 TABLET: 8.6; 5 TABLET ORAL at 19:44

## 2017-02-28 RX ADMIN — HEPARIN SODIUM 5000 UNITS: 5000 INJECTION, SOLUTION INTRAVENOUS; SUBCUTANEOUS at 04:00

## 2017-02-28 RX ADMIN — MULTIVITAMIN 15 ML: LIQUID ORAL at 08:53

## 2017-02-28 RX ADMIN — HEPARIN SODIUM 5000 UNITS: 5000 INJECTION, SOLUTION INTRAVENOUS; SUBCUTANEOUS at 16:44

## 2017-02-28 RX ADMIN — Medication 2 SPRAY: at 11:50

## 2017-02-28 RX ADMIN — Medication 2 SPRAY: at 20:09

## 2017-02-28 RX ADMIN — SENNOSIDES AND DOCUSATE SODIUM 1 TABLET: 8.6; 5 TABLET ORAL at 11:50

## 2017-02-28 RX ADMIN — OXYCODONE HYDROCHLORIDE AND ACETAMINOPHEN 1 TABLET: 5; 325 TABLET ORAL at 09:58

## 2017-02-28 RX ADMIN — FUROSEMIDE 40 MG: 10 INJECTION, SOLUTION INTRAVENOUS at 08:53

## 2017-02-28 RX ADMIN — NICOTINE 1 PATCH: 14 PATCH, EXTENDED RELEASE TRANSDERMAL at 22:05

## 2017-02-28 RX ADMIN — SODIUM BICARBONATE 325 MG: 325 TABLET ORAL at 16:43

## 2017-02-28 RX ADMIN — PANCRELIPASE 72000 UNITS: 24000; 76000; 120000 CAPSULE, DELAYED RELEASE PELLETS ORAL at 08:52

## 2017-02-28 RX ADMIN — SODIUM BICARBONATE 325 MG: 325 TABLET ORAL at 20:03

## 2017-02-28 RX ADMIN — PANCRELIPASE 72000 UNITS: 24000; 76000; 120000 CAPSULE, DELAYED RELEASE PELLETS ORAL at 11:50

## 2017-02-28 RX ADMIN — HYDROMORPHONE HYDROCHLORIDE 1 MG: 10 INJECTION, SOLUTION INTRAMUSCULAR; INTRAVENOUS; SUBCUTANEOUS at 03:57

## 2017-02-28 RX ADMIN — SODIUM BICARBONATE 325 MG: 325 TABLET ORAL at 11:50

## 2017-02-28 RX ADMIN — SODIUM BICARBONATE 325 MG: 325 TABLET ORAL at 08:53

## 2017-02-28 RX ADMIN — OXYCODONE HYDROCHLORIDE AND ACETAMINOPHEN 1 TABLET: 5; 325 TABLET ORAL at 19:44

## 2017-02-28 RX ADMIN — HYDROMORPHONE HYDROCHLORIDE 1 MG: 10 INJECTION, SOLUTION INTRAMUSCULAR; INTRAVENOUS; SUBCUTANEOUS at 21:12

## 2017-02-28 RX ADMIN — Medication 2 SPRAY: at 08:53

## 2017-02-28 RX ADMIN — PANCRELIPASE 72000 UNITS: 24000; 76000; 120000 CAPSULE, DELAYED RELEASE PELLETS ORAL at 04:00

## 2017-02-28 RX ADMIN — HYDROMORPHONE HYDROCHLORIDE 0.5 MG: 10 INJECTION, SOLUTION INTRAMUSCULAR; INTRAVENOUS; SUBCUTANEOUS at 00:00

## 2017-02-28 RX ADMIN — PANCRELIPASE 72000 UNITS: 24000; 76000; 120000 CAPSULE, DELAYED RELEASE PELLETS ORAL at 20:03

## 2017-02-28 RX ADMIN — OXYCODONE HYDROCHLORIDE AND ACETAMINOPHEN 1 TABLET: 5; 325 TABLET ORAL at 01:01

## 2017-02-28 RX ADMIN — HYDROMORPHONE HYDROCHLORIDE 1 MG: 10 INJECTION, SOLUTION INTRAMUSCULAR; INTRAVENOUS; SUBCUTANEOUS at 06:59

## 2017-02-28 ASSESSMENT — PAIN DESCRIPTION - DESCRIPTORS
DESCRIPTORS: BURNING
DESCRIPTORS: SHARP
DESCRIPTORS: DULL
DESCRIPTORS: ACHING
DESCRIPTORS: SHARP
DESCRIPTORS: SHARP
DESCRIPTORS: BURNING
DESCRIPTORS: DULL
DESCRIPTORS: SHARP
DESCRIPTORS: SHARP
DESCRIPTORS: DULL
DESCRIPTORS: DULL

## 2017-02-28 NOTE — PROGRESS NOTES
Social Work: Assessment with Discharge Plan    Patient Name:  Jayson Bella  :  1970  Age:  46 year old  MRN:  [id number]  Risk/Complexity Score:  Filed Complexity Screen Score: 6  Completed assessment with:  Pt and pt's spouse    Presenting Information   Reason for Referral:  Financial issues  Date of Intake:  2017  Referral Source:  Chart Review  Decision Maker:  Pt is his own decision maker.  Alternate Decision Maker:  Spouse Bri Bella is next of kin and surrogate decision maker.  Health Care Directive:  Patient considering completing  Living Situation:  Pt lives in a single family home in [place] ([place] of [facility] 1 stair to enter home and 1 step inside to sunken in living room. Pt lives with his spouse and 2 children ages 9 and 7.  Previous Functional Status:  Independent  Cultural/Language/Spiritual Considerations:  English is primary language. No Hindu or spiritual considerations.    Physical Health  Reason for Admission:  Necrotizing Pancreatitis  Services Needed/Recommended:  Home with homecare    Mental Health/Chemical Dependency  Diagnosis:    MH: Diagnosis of depression and anxiety  CD: History of alcohol abuse in the past (12 beers/day), quit in , no use currently. Current every day cigarette smoker since .  Support/Services in Place:  Nicotine patch  Services Needed/Recommended:  none    Support System  Significant relationship at present time:  Spouse, Bri Bella (P: [phone])  Family of origin is available for support:  Yes, spouse is staying with pt while hospitalized.  Other support available:  friends  Gaps in support system:  none  Patient is caregiver to:  Child:  2 children ages 7 and 9.     Provider Information   Primary Care Physician:  Olga Cross   [phone]   Clinic:  [place] [address] / [facility] [address]      :  none    Financial   Income Source:  Self-employed with various jobs including logging, ,  and farming.  Financial and Insurance Concerns:  Pt does not currently have health insurance and has never had health insurance. Pt's spouse works seasonally for a school and also did not sign up for health insurance for herself or family. Pt's spouse does not know an approximate monthly income as it fluctuates with pt's various jobs. Pt's spouse does not have current access to any financial paperwork/info as it is at home.    Writer provided written information and educated pt/spouse re: [employer] marketplace and enrollment periods, MA and spenddown eligibility and SMRT process. Pt/spouse were offered [facility] financial counselor visit for more in-depth information and they are agreeable to a visit any day but today.    Pt's spouse Bri states they met with a financial counselor in [place] and Bri believes they were over-income for medical assistance. Bri states the financial counselor did not mention anything about a spenddown. Pt's spouse has a friend who works for an  ([employer]?) and had been discussing options, but pt's spouse is unclear if any of the offered options could be put in place immediately.    Discharge Plan   Patient and family discharge goal:  Home  Provided education on discharge plan:  YES  Patient agreeable to discharge plan:  YES  General information regarding anticipated insurance coverage and possible out of pocket cost was discussed. Patient and patient's family are aware patient may incur the cost of transportation to the facility, pending insurance payment: YES  Barriers to discharge:  Medical readiness    Discharge Recommendations   Anticipated Disposition:  Home, unclear if pt would need services and pt has no insurance to cover any home services.  Transportation Needs:  Family:  spouse  Name of Transportation Company and Phone:  n/a    [facility], [facility]  6B Intermediate Care Unit  Phone: [phone]  Pager: [phone]

## 2017-02-28 NOTE — PLAN OF CARE
Problem: Goal Outcome Summary  Goal: Goal Outcome Summary  Outcome: No Change  Pt is A&Ox4, vss.  Pain in abd LUQ managed via multiple PRN pain medications with partial effectiveness noted.  Transfer orders received to move patient to .  report called to  nurse.  Continue to monitor

## 2017-02-28 NOTE — PLAN OF CARE
"Problem: Goal Outcome Summary  Goal: Goal Outcome Summary  /86  Temp 98  F (36.7  C) (Oral)  Resp 18  Ht 1.905 m (6' 3\")  Wt 128.5 kg (283 lb 4.8 oz)  SpO2 93%  BMI 35.41 kg/m2     Alert, oriented x4, forgetful at times.  VSS, afebrile, sats mid 90s on RA.  Needs 2L when sleeping.  Complained of abdominal pain/tightness, percocet given x1 with good effect.  Greenberg patent with good UOP.  No BM this shift, but is passing gas frequently.  NJ with TF, increased to 85mL/hr, tolerating well.  Oral mucosa dry, requesting water/ice frequently, order received for biotene which is helping.  Dressing from old chest tube site dry and intact.  Bilateral lymph wraps in place, both legs needed to be rewrapped after patient removed wrap while up in chair.  Patient making needs known, continue with plan of care.      "

## 2017-02-28 NOTE — PHARMACY
[facility], [place]   Antimicrobial Management Team (AMT) Note  Antimicrobial Stewardship Program-- A joint venture between [facility] and  Physicians to optimize antibiotic management.  NOT a formal Consult-Restricted Antibiotic Review  To: Ted 3  Unit: 6B  Allergies:   Penicillins: swelling, rash  Infection History: none  Brief Summary: Jayson Bella is a 46 year old male with a past medical history of gallstone pancreatitis s/p cholecystectomy (12/18/2016), complicated by pseudocyst compressing bile duct s/p ERCP (1/11/2017), depression, anxiety, tobacco abuse, and past alcohol abuse.  He presents to [facility] from an outside hospital for sepsis secondary to necrotizing pancreatitis and ERCP versus IR drainage of intraabdominal fluid.  HPI: He presented at an outside hospital on 12/13/16 with acute pancreatitis.  He then presented with jaundice due to pseudocyst compressing bile duct s/p ERCP.  The patient had continued pain and anorexia since.  He was hospitalized on 2/14/16 with generalized weakness and abdominal pain.  During the February admission, he was initiated on meropenem due to increased WBC and fevers.  A thoracentesis and paracentesis was performed.  On 2/25/17 he presented to [facility] with leukocytosis, tachycardia, and fever (qSOFA 0/3).  He was continued on meropenem but continued to spike fevers likely due to inadequate source control (necrotizing pancreatitis).  The patient notes abdominal pain, watery stools approximately twice a day, SOB, fever, anorexia, and recent urinary retention.   Interval History:  2/25/17: Procalcitonin 0.43  2/26/17: GI consulted, recommend repeat paracentesis when appropriate.  One dose of vancomycin given and subsequently discontinued.  2/27/17: CT abdomen from outside hospital did not indicate any air in the necrotic collection.  Assessment:   ID- Acute Necrotizing Pancreatitis with Katt-Pancreatic Acute Fluid Collections/Walled  Off Necrosis  The patient initially presented with sepsis secondary to severe necrotizing pancreatitis.  The severe necrotizing pancreatitis is complicated by acute necrotic collections, acute kidney injury, pleural effusion, and ascites.  The patient currently has leukocytosis (WBC 16.3).  However the WBC count has been trending downward since admission.  The patient s CRP is also elevated but has decreased since admission.  The leukocytosis and CRP are consistent with the patient's acute necrotizing pancreatitis.  The patient is afebrile and is not tachycardiac.  His serum creatinine is 1.44 today, with a CrCl of 94 mL/min.  The CT abdomen does not indicate an infection.  He has been on meropenem at the outside hospital and throughout his stay at [facility].  Agree with plan to discontinue meropenem.   Recommendation/Interventions:   - Agree to discontinue meropenem.    Discussed w/ ID Staff- Dr. Martínez Mclain    Current Antibiotic therapy:  None    Previous Antibiotic therapy:  Meropenem 1 g IV Q8H  Vancomycin 2000 mg IV Q24H (one dose given on 2/26/17)     Vital Signs and other clinical features:  BP: 133/89  Temp: 97.9 (36.6)  Height: 1.905 m (2/25/17)                     Weight: 130.1 kg (2/28/17)  Pulse: 91 (2/27/17)  Resp: 20  SpO2: 97%    Temperature Curve:     Labs:    Recent Labs:    Serum Creatinine  Estimated creatinine clearance: 94.1 mL/min (based on SCr 1.44 mg/mL)      White Blood Cell Count      Procalcitonin      CRP    Lipase      Imaging:  Narrative   EXAMINATION: CT OUTSIDE READ, 2/27/2017 9:37 AM    TECHNIQUE: Outside films dated 24 February 2017 were submitted for  interpretation. CT images extending from mid chest to the proximal  femora were performed with oral contrast.    COMPARISON: CT abdomen pelvis 23 February, 2017    PREVIOUS REPORT: The original interpretation was available for review  at the time of this dictation.     HISTORY: Pancreatitis    FINDINGS:   This report is designed to answer  a focused clinical  question and should be interpreted in conjunction with the original  report.     5.9 x 4.8 x 3.3 cm hypoattenuating collection with peripheral 3 mm rim  in the pancreatic tail (series 4 image 70). There is also an area of  hypoattenuation in the more proximal body, tail, and head of the  pancreas that has a slightly thicker associated rim, 4.5 x 2.8 x 2.0  cm (series 4 image 73). There is significant quantity of low density  layering ascites, however there are multiple areas of loculated fluid:      anterior to the liver (series 4 image 38)  associated with some small bowel in the central abdomen (series 4  image 103)   right midabdomen (series 4 image 95)  near the pancreatic tail/spleen (series 4 image 79)  anterior left mid abdomen adjacent to the tip of the feeding tube,  which is at the duodenal jejunal junction (series 4 image 82)    None of the collections contains air. Significant surrounding  inflammatory changes and many reactive adjacent lymph nodes in the  retroperitoneum and mesentery. Significant thickening in a loop of  small bowel in the anterior left mid abdomen (series 4 image 88).  Bibasilar atelectasis.  Decreased left pleural effusion since  2/23/2017.  Minimal bilateral pleural effusions. There is a loculated  component of the left pleural effusion along the major fissure. No  suspicious bone lesions. No free peritoneal air. No bowel obstruction.  Anasarca.    Cholecystectomy. The kidneys, adrenal glands, and spleen are  unremarkable in this noncontrast examination. The bladder is partially  distended.     Impression   IMPRESSION:   1.  Acute pancreatitis, with hypoattenuating areas in the pancreas,  difficult to evaluate without intravenous contrast but acute necrotic  collections/walled off necrosis are suspected based on the CT from 14 February, 2017. No internal air to suggest superimposed infection.  Additional areas in the abdomen are suspicious for necrotic   collections, many associated with thickened loops of small bowel.     2.  Many enlarged peripancreatic, retroperitoneal and mesenteric lymph  nodes, likely reactive.    3.  Small bilateral pleural effusions, decreased since 2/23/2017, with  remaining small loculated component on the left.    I have personally reviewed the examination and initial interpretation  and I agree with the findings.    JOJO CABALLERO MD     Cultures:    Other Microbiology Reports      Fever/Antibiotics Micro 7-Day Micro 30-Day Micro 1 Year (without susceptibilities)       30-Day Micro Results      Procedure Component Value Units Date/Time     Cell count with differential fluid [[id number]]      Order Status: No result Lab Status: No result      Specimen: Ascites Fluid from Abdominal Fluid      Albumin fluid [[id number]]      Order Status: No result Lab Status: No result      Specimen: Ascites Fluid from Abdominal Fluid      Protein total: Fluid [[id number]]      Order Status: No result Lab Status: No result      Specimen: Ascites Fluid from Abdominal Fluid      Amylase fluid [[id number]]      Order Status: No result Lab Status: No result      Specimen: Ascites Fluid from Abdominal Fluid      Gram stain [[id number]]      Order Status: No result Lab Status: No result      Specimen: Ascites Fluid from Abdominal Fluid      Fluid Culture Aerobic Bacterial [[id number]]      Order Status: No result Lab Status: No result      Specimen: Ascites Fluid from Abdominal Fluid      Anaerobic bacterial culture [[id number]]      Order Status: No result Lab Status: No result      Specimen: Ascites Fluid from Abdominal Fluid      Fungus Culture, non-blood [[id number]]      Order Status: No result Lab Status: No result      Specimen: Ascites Fluid from Abdominal Fluid      Lipase fluid [[id number]]      Order Status: No result Lab Status: No result      Specimen: Ascites Fluid      Urine Culture Aerobic Bacterial [[id number]] Collected: 02/26/17 1936     Order Status:  Completed Lab Status: Final result Updated: 02/27/17 2042     Specimen: Urine from Urine catheter       Specimen Description Catheterized Urine      Culture Micro No growth      Micro Report Status FINAL 02/27/2017     Yeast culture [[id number]]      Order Status: No result Lab Status: No result      Specimen: Blood      Blood culture [[id number]] Collected: 02/26/17 0000     Order Status: Completed Lab Status: Final result Updated: 02/26/17 0424     Specimen: Blood       Specimen Description Blood      Culture Micro Canceled, Test credited Duplicate request      Micro Report Status FINAL 02/26/2017     Blood culture [[id number]] Collected: 02/26/17 0000     Order Status: Completed Lab Status: Final result Updated: 02/26/17 0424     Specimen: Blood       Specimen Description Blood      Culture Micro Canceled, Test credited Duplicate request      Micro Report Status FINAL 02/26/2017     Blood culture [[id number]] Collected: 02/25/17 2338     Order Status: Completed Lab Status: Preliminary result Updated: 02/28/17 0613     Specimen: Blood       Specimen Description Blood Left Arm      Culture Micro No growth after 2 days      Micro Report Status Pending     Blood culture [[id number]] Collected: 02/25/17 2338     Order Status: Completed Lab Status: Preliminary result Updated: 02/28/17 0613     Specimen: Blood       Specimen Description Blood Right Arm      Culture Micro No growth after 2 days      Micro Report Status Pending           Radha Blair, PharmD, Pharmacy Resident

## 2017-02-28 NOTE — PROGRESS NOTES
GASTROENTEROLOGY PROGRESS NOTE        ASSESSMENT/ RECOMMENDATIONS:    46 year old male with past history depression, anxiety, tobacco abuse, past ETOH abuse, recent history significant for presumed gallstone pancreatitis (12/14/2016) s/p ERCP w sphincterotomy with biliary sludge (12/17/17), laparoscopic cholecystectomy (12/18/17), repeat ERCP with biliary stent (7 cm x 10 F ) for elevated LFT (1/12/17) transferred here from [place], [place]) for further management of necrotizing pancreatitis with acute necrotic collections.     Severe necrotizing pancreatitis is complicated by acute necrotic collections, acute kidney injury, ascites and pleural effusions s/p chest tube. Recent CT abdomen did not show any gas in collection therefore unlikely to be infected, he is on empiric meropenem since 2/14, with negative infectious workup.     Although on most recent CT abdomen (2/24/17), there does not appear to be fully organized collection, the lack of contrast limits our ability to assess this. At this time, the patient is clinically stable with improving leukocytosis and there is not an urgent indication for either percutaneous or endoscopic intervention.       RECOMMENDATIONS:    Repeat paracentesis, tap to dry, please send full work-up including amylase/lipase.    Continue tube feeds through NJ tube. Ok with clear liquid diet. Agree with pancreatic enzyme supplementation with tube feeds.     No endoscopic intervention currently as no mature collection at this point. Will consider reimaging if any clinical deterioration, also waiting for renal function to improve, as need to get repeat CT w contrast in future.     Recommend stopping meropenem as patient is on meropenem for past 2 wks, will monitor off antibiotics, infectious workup negative till date.     DVT prophylaxis (prefer heparin were procedure to be needed).    The patient was discussed and plan agreed upon with GI staff, Dr. Quevedo.  "Terrence John  [facility]  Gastroenterology Fellow  _______________________________________________________________    S: had worsening abdominal pain today, required extra dosed of IV Dilaudid overnight, denied any nausea or vomiting, no chest pain, mild SOB present. No melena or hematochezia.     O:  Blood pressure 133/89, temperature 97.9  F (36.6  C), temperature source Oral, resp. rate 20, height 1.905 m (6' 3\"), weight 130.1 kg (286 lb 12.8 oz), SpO2 97 %.    Gen: no distress  HEENT: no icterus, NJ present.   CV: normal S1, S2, no tachycardia.   Lungs: CTAB  Abd: soft, mild tenderness in mid and RUQ  Neuro: no focal motor deficits      LABS:  BMP    Recent Labs  Lab 02/28/17  0637 02/27/17  0626 02/26/17  0601 02/25/17  2206    143 141 142   POTASSIUM 4.2 4.0 3.9 3.8   CHLORIDE 103 108 107 107   YAN 7.2* 7.5* 7.2* 7.1*   CO2 29 28 29 28   BUN 36* 33* 33* 33*   CR 1.44* 1.68* 1.83* 1.84*   * 92 87 95     CBC    Recent Labs  Lab 02/28/17  0637 02/27/17  0626 02/26/17  0601 02/25/17  2206   WBC 16.3* 16.2* 21.0* 25.3*   RBC 3.92* 3.59* 3.34* 3.63*   HGB 10.3* 9.5* 8.7* 9.7*   HCT 32.5* 30.0* 28.0* 29.9*   MCV 83 84 84 82   MCH 26.3* 26.5 26.0* 26.7   MCHC 31.7 31.7 31.1* 32.4   RDW 16.6* 16.6* 16.6* 16.3*   * 509* 489* 449     INRNo lab results found in last 7 days.  LFTs    Recent Labs  Lab 02/28/17  0637 02/25/17 2206   ALKPHOS  --  90   AST 31 30   ALT 16 25   BILITOTAL  --  0.4   PROTTOTAL 5.8* 5.6*   ALBUMIN 1.2* 1.4*      PANC    Recent Labs  Lab 02/25/17 2206   LIPASE 1070*   AMYLASE 577*       "

## 2017-02-28 NOTE — PROCEDURES
Hospitalist Procedure Service - Paracentesis Procedure Note  Date of Service: 02/28/2017    Patient: Jayson Bella  MRN: [id number]  Admission Date: 2/25/2017  Hospital Day # 3    Reason for procedure: assess ascites fluid composition, decompress    Attending: Anurag Silviera  I could not identify any areas of free fluid in the abdomen. In the RLQ there were isolate areas that at first glance appeared to be fluid, but after further scanning appeared to be fluid filled intestine. After reviewing CT imaging, I suppose this could have been the areas of possible loculated fluid, but I certainly do not see any safe areas for bedside paracentesis.   The primary team was informed of the above findings.    Anurag Silveira MD  Med-Peds Hospitalist  Pager [phone]

## 2017-02-28 NOTE — PLAN OF CARE
"Problem: Goal Outcome Summary  Goal: Goal Outcome Summary  Outcome: No Change  Tube feeding at goal.  Needing IV hydromorphone for breakthrough pain overnight.  Complained of left upper quadrant burning and sharp pain.  Poor sleep overnight due to pain, requiring frequent PRN pain medications.  Up to the chair for an hour.  Has not had a BM for 2-3 days per patient, passing flatus.  /87  Temp 97.6  F (36.4  C) (Oral)  Resp 18  Ht 1.905 m (6' 3\")  Wt 130.1 kg (286 lb 12.8 oz)  SpO2 93%  BMI 35.85 kg/m2      "

## 2017-02-28 NOTE — PROGRESS NOTES
Care Coordinator Progress Note     Admission Date/Time:  2/25/2017  Attending MD:  Emilia Rose MD     Data  Chart reviewed, discussed with interdisciplinary team.   Patient was admitted for: Data Unavailable.    Concerns with insurance coverage for discharge needs: no insurance coverage.  Current Living Situation: Patient lives with family.  Support System: Supportive and Involved  Services Involved: No services involved prior to admission   Transportation: Family or Friend will provide  Barriers to Discharge: financial support inadequate and pt is not medically stable for discharge at this time     Assessment  Pt is a 46 year old male with history of gallstone pancreatitis s/p cholecystectomy in 12/2016 c/b biliary obstruction due to large pseudocyst s/p ERCP with stent placement who was transferred from OSH for sepsis, fever, and severe leukocytosis concerning for necrotizing pancreatitis. Per chart review, pt has no insurance listed and financial counselors have met with pt but pt does not want financial counselor assistance. Met with pt and wife, Bri, to discuss insurance coverage. Bri stated that pt missed open enrollment for [employer] and Bri already checked to see if pt would be eligible for medical assistance and, per Bri, they make too much. Spoke with financial counseling to see if there any other options or resources available such as juan care. Per Sujey from financial counseling, pt is not eligible for juan care due to pt's have to have insurance in order to qualify.       Plan  Anticipated Discharge Date:  To be determined  Anticipated Discharge Plan:  To be determined    Caitie Bell RN

## 2017-02-28 NOTE — PLAN OF CARE
Problem: Goal Outcome Summary  Goal: Goal Outcome Summary  Edema 6B: Pt removed wraps yesterday, which were reapplied by RN. Pt with reductions in 10/12 B LE measurements. Continues to demo soft 2+-3+ pitting in feet and 2+ in ankles, 1+-2+ to knee. Pt rewrapped with GCB to promote fluid mobilization. OK for pt to wear compression bandages until therapy returns. However, please remove any compression if it causes numbness, tingling, pain, becomes soiled, or if pt becomes unable to verbalize pain. Will follow up with patient.

## 2017-02-28 NOTE — PLAN OF CARE
Problem: Goal Outcome Summary  Goal: Goal Outcome Summary  Outcome: No Change  Transfer  Transferred to: 7C  Via: wheelchair  Reason for transfer: Pt no longer appropriate for 6B- improved patient condition  Family: Aware of transfer  Belongings: Packed and sent with pt  Chart: Delivered with pt to next unit  Medications: Meds received from old unit with pt  Pt status: stable

## 2017-02-28 NOTE — PLAN OF CARE
Problem: Goal Outcome Summary  Goal: Goal Outcome Summary  PT 7C: CANCEL; pt having procedure performed at bedside and then transferring. Will reschedule.

## 2017-03-01 ENCOUNTER — APPOINTMENT (OUTPATIENT)
Dept: PHYSICAL THERAPY | Facility: CLINIC | Age: 47
DRG: 871 | End: 2017-03-01
Attending: INTERNAL MEDICINE

## 2017-03-01 ENCOUNTER — APPOINTMENT (OUTPATIENT)
Dept: CT IMAGING | Facility: CLINIC | Age: 47
DRG: 871 | End: 2017-03-01
Attending: INTERNAL MEDICINE

## 2017-03-01 ENCOUNTER — APPOINTMENT (OUTPATIENT)
Dept: OCCUPATIONAL THERAPY | Facility: CLINIC | Age: 47
DRG: 871 | End: 2017-03-01
Attending: INTERNAL MEDICINE

## 2017-03-01 LAB
ALBUMIN SERPL-MCNC: 1.3 G/DL (ref 3.4–5)
ALP SERPL-CCNC: 87 U/L (ref 40–150)
ALT SERPL W P-5'-P-CCNC: 15 U/L (ref 0–70)
ANION GAP SERPL CALCULATED.3IONS-SCNC: 6 MMOL/L (ref 3–14)
AST SERPL W P-5'-P-CCNC: 20 U/L (ref 0–45)
BILIRUB DIRECT SERPL-MCNC: 0.1 MG/DL (ref 0–0.2)
BILIRUB SERPL-MCNC: 0.3 MG/DL (ref 0.2–1.3)
BUN SERPL-MCNC: 40 MG/DL (ref 7–30)
CALCIUM SERPL-MCNC: 7.5 MG/DL (ref 8.5–10.1)
CHLORIDE SERPL-SCNC: 102 MMOL/L (ref 94–109)
CO2 SERPL-SCNC: 30 MMOL/L (ref 20–32)
CREAT SERPL-MCNC: 1.28 MG/DL (ref 0.66–1.25)
CRP SERPL-MCNC: 180 MG/L (ref 0–8)
ERYTHROCYTE [DISTWIDTH] IN BLOOD BY AUTOMATED COUNT: 16.7 % (ref 10–15)
GFR SERPL CREATININE-BSD FRML MDRD: 61 ML/MIN/1.7M2
GLUCOSE SERPL-MCNC: 122 MG/DL (ref 70–99)
HCT VFR BLD AUTO: 30.3 % (ref 40–53)
HGB BLD-MCNC: 9.4 G/DL (ref 13.3–17.7)
LACTATE BLD-SCNC: 0.6 MMOL/L (ref 0.7–2.1)
MAGNESIUM SERPL-MCNC: 1.8 MG/DL (ref 1.6–2.3)
MCH RBC QN AUTO: 25.7 PG (ref 26.5–33)
MCHC RBC AUTO-ENTMCNC: 31 G/DL (ref 31.5–36.5)
MCV RBC AUTO: 83 FL (ref 78–100)
PLATELET # BLD AUTO: 477 10E9/L (ref 150–450)
POTASSIUM SERPL-SCNC: 3.8 MMOL/L (ref 3.4–5.3)
PROCALCITONIN SERPL-MCNC: 0.48 NG/ML
PROT SERPL-MCNC: 6.1 G/DL (ref 6.8–8.8)
RBC # BLD AUTO: 3.66 10E12/L (ref 4.4–5.9)
SODIUM SERPL-SCNC: 137 MMOL/L (ref 133–144)
WBC # BLD AUTO: 21.2 10E9/L (ref 4–11)

## 2017-03-01 PROCEDURE — 25500064 ZZH RX 255 OP 636: Performed by: RADIOLOGY

## 2017-03-01 PROCEDURE — 80076 HEPATIC FUNCTION PANEL: CPT | Performed by: STUDENT IN AN ORGANIZED HEALTH CARE EDUCATION/TRAINING PROGRAM

## 2017-03-01 PROCEDURE — 84145 PROCALCITONIN (PCT): CPT | Performed by: STUDENT IN AN ORGANIZED HEALTH CARE EDUCATION/TRAINING PROGRAM

## 2017-03-01 PROCEDURE — 74177 CT ABD & PELVIS W/CONTRAST: CPT

## 2017-03-01 PROCEDURE — 40000556 ZZH STATISTIC PERIPHERAL IV START W US GUIDANCE

## 2017-03-01 PROCEDURE — 25000128 H RX IP 250 OP 636: Performed by: INTERNAL MEDICINE

## 2017-03-01 PROCEDURE — 12000008 ZZH R&B INTERMEDIATE UMMC

## 2017-03-01 PROCEDURE — 25800025 ZZH RX 258: Performed by: STUDENT IN AN ORGANIZED HEALTH CARE EDUCATION/TRAINING PROGRAM

## 2017-03-01 PROCEDURE — 27210436 ZZH NUTRITION PRODUCT SEMIELEM INTERMED CAN

## 2017-03-01 PROCEDURE — 97530 THERAPEUTIC ACTIVITIES: CPT | Mod: GP

## 2017-03-01 PROCEDURE — 40000133 ZZH STATISTIC OT WARD VISIT: Performed by: OCCUPATIONAL THERAPIST

## 2017-03-01 PROCEDURE — 25000132 ZZH RX MED GY IP 250 OP 250 PS 637: Performed by: INTERNAL MEDICINE

## 2017-03-01 PROCEDURE — 86140 C-REACTIVE PROTEIN: CPT | Performed by: STUDENT IN AN ORGANIZED HEALTH CARE EDUCATION/TRAINING PROGRAM

## 2017-03-01 PROCEDURE — 40000193 ZZH STATISTIC PT WARD VISIT

## 2017-03-01 PROCEDURE — 97140 MANUAL THERAPY 1/> REGIONS: CPT | Mod: GO | Performed by: OCCUPATIONAL THERAPIST

## 2017-03-01 PROCEDURE — 25000128 H RX IP 250 OP 636: Performed by: STUDENT IN AN ORGANIZED HEALTH CARE EDUCATION/TRAINING PROGRAM

## 2017-03-01 PROCEDURE — 36415 COLL VENOUS BLD VENIPUNCTURE: CPT | Performed by: INTERNAL MEDICINE

## 2017-03-01 PROCEDURE — 97116 GAIT TRAINING THERAPY: CPT | Mod: GP

## 2017-03-01 PROCEDURE — 97110 THERAPEUTIC EXERCISES: CPT | Mod: GP

## 2017-03-01 PROCEDURE — 85027 COMPLETE CBC AUTOMATED: CPT | Performed by: STUDENT IN AN ORGANIZED HEALTH CARE EDUCATION/TRAINING PROGRAM

## 2017-03-01 PROCEDURE — 83735 ASSAY OF MAGNESIUM: CPT | Performed by: STUDENT IN AN ORGANIZED HEALTH CARE EDUCATION/TRAINING PROGRAM

## 2017-03-01 PROCEDURE — 36415 COLL VENOUS BLD VENIPUNCTURE: CPT | Performed by: STUDENT IN AN ORGANIZED HEALTH CARE EDUCATION/TRAINING PROGRAM

## 2017-03-01 PROCEDURE — 27210995 ZZH RX 272

## 2017-03-01 PROCEDURE — 80048 BASIC METABOLIC PNL TOTAL CA: CPT | Performed by: STUDENT IN AN ORGANIZED HEALTH CARE EDUCATION/TRAINING PROGRAM

## 2017-03-01 PROCEDURE — 99233 SBSQ HOSP IP/OBS HIGH 50: CPT | Performed by: INTERNAL MEDICINE

## 2017-03-01 PROCEDURE — 83605 ASSAY OF LACTIC ACID: CPT | Performed by: INTERNAL MEDICINE

## 2017-03-01 PROCEDURE — 25000132 ZZH RX MED GY IP 250 OP 250 PS 637: Performed by: STUDENT IN AN ORGANIZED HEALTH CARE EDUCATION/TRAINING PROGRAM

## 2017-03-01 RX ORDER — AMOXICILLIN 250 MG
2 CAPSULE ORAL 2 TIMES DAILY
Status: DISCONTINUED | OUTPATIENT
Start: 2017-03-01 | End: 2017-03-13

## 2017-03-01 RX ORDER — MEROPENEM 1 G/1
1 INJECTION, POWDER, FOR SOLUTION INTRAVENOUS EVERY 8 HOURS
Status: DISCONTINUED | OUTPATIENT
Start: 2017-03-01 | End: 2017-03-01

## 2017-03-01 RX ORDER — IOPAMIDOL 755 MG/ML
135 INJECTION, SOLUTION INTRAVASCULAR ONCE
Status: COMPLETED | OUTPATIENT
Start: 2017-03-01 | End: 2017-03-01

## 2017-03-01 RX ORDER — SODIUM CHLORIDE, SODIUM LACTATE, POTASSIUM CHLORIDE, CALCIUM CHLORIDE 600; 310; 30; 20 MG/100ML; MG/100ML; MG/100ML; MG/100ML
INJECTION, SOLUTION INTRAVENOUS CONTINUOUS
Status: DISCONTINUED | OUTPATIENT
Start: 2017-03-01 | End: 2017-03-02

## 2017-03-01 RX ORDER — FLUORIDE TOOTHPASTE
30 TOOTHPASTE DENTAL 4 TIMES DAILY
Status: DISCONTINUED | OUTPATIENT
Start: 2017-03-01 | End: 2017-03-12

## 2017-03-01 RX ORDER — FUROSEMIDE 10 MG/ML
40 INJECTION INTRAMUSCULAR; INTRAVENOUS
Status: DISCONTINUED | OUTPATIENT
Start: 2017-03-01 | End: 2017-03-01

## 2017-03-01 RX ORDER — MEROPENEM 1 G/1
1 INJECTION, POWDER, FOR SOLUTION INTRAVENOUS EVERY 8 HOURS
Status: DISCONTINUED | OUTPATIENT
Start: 2017-03-01 | End: 2017-03-04

## 2017-03-01 RX ADMIN — SODIUM BICARBONATE 325 MG: 325 TABLET ORAL at 03:55

## 2017-03-01 RX ADMIN — MEROPENEM 1 G: 1 INJECTION, POWDER, FOR SOLUTION INTRAVENOUS at 13:12

## 2017-03-01 RX ADMIN — HYDROMORPHONE HYDROCHLORIDE 1 MG: 10 INJECTION, SOLUTION INTRAMUSCULAR; INTRAVENOUS; SUBCUTANEOUS at 08:10

## 2017-03-01 RX ADMIN — SODIUM BICARBONATE 325 MG: 325 TABLET ORAL at 08:12

## 2017-03-01 RX ADMIN — SODIUM BICARBONATE 325 MG: 325 TABLET ORAL at 00:04

## 2017-03-01 RX ADMIN — OXYCODONE HYDROCHLORIDE AND ACETAMINOPHEN 1 TABLET: 5; 325 TABLET ORAL at 11:36

## 2017-03-01 RX ADMIN — OXYCODONE HYDROCHLORIDE AND ACETAMINOPHEN 1 TABLET: 5; 325 TABLET ORAL at 22:12

## 2017-03-01 RX ADMIN — SODIUM BICARBONATE 325 MG: 325 TABLET ORAL at 20:05

## 2017-03-01 RX ADMIN — PANCRELIPASE 72000 UNITS: 24000; 76000; 120000 CAPSULE, DELAYED RELEASE PELLETS ORAL at 00:03

## 2017-03-01 RX ADMIN — HYDROMORPHONE HYDROCHLORIDE 0.5 MG: 10 INJECTION, SOLUTION INTRAMUSCULAR; INTRAVENOUS; SUBCUTANEOUS at 18:49

## 2017-03-01 RX ADMIN — PANCRELIPASE 72000 UNITS: 24000; 76000; 120000 CAPSULE, DELAYED RELEASE PELLETS ORAL at 20:05

## 2017-03-01 RX ADMIN — OXYCODONE HYDROCHLORIDE AND ACETAMINOPHEN 1 TABLET: 5; 325 TABLET ORAL at 16:06

## 2017-03-01 RX ADMIN — HEPARIN SODIUM 5000 UNITS: 5000 INJECTION, SOLUTION INTRAVENOUS; SUBCUTANEOUS at 03:54

## 2017-03-01 RX ADMIN — HYDROMORPHONE HYDROCHLORIDE 1 MG: 10 INJECTION, SOLUTION INTRAMUSCULAR; INTRAVENOUS; SUBCUTANEOUS at 11:36

## 2017-03-01 RX ADMIN — Medication 30 ML: at 13:39

## 2017-03-01 RX ADMIN — Medication 2 SPRAY: at 20:05

## 2017-03-01 RX ADMIN — HYDROMORPHONE HYDROCHLORIDE 0.5 MG: 10 INJECTION, SOLUTION INTRAMUSCULAR; INTRAVENOUS; SUBCUTANEOUS at 20:03

## 2017-03-01 RX ADMIN — MULTIVITAMIN 15 ML: LIQUID ORAL at 08:16

## 2017-03-01 RX ADMIN — IOPAMIDOL 135 ML: 755 INJECTION, SOLUTION INTRAVENOUS at 14:49

## 2017-03-01 RX ADMIN — PANCRELIPASE 72000 UNITS: 24000; 76000; 120000 CAPSULE, DELAYED RELEASE PELLETS ORAL at 16:21

## 2017-03-01 RX ADMIN — Medication 2 SPRAY: at 16:06

## 2017-03-01 RX ADMIN — MEROPENEM 1 G: 1 INJECTION, POWDER, FOR SOLUTION INTRAVENOUS at 19:56

## 2017-03-01 RX ADMIN — PANCRELIPASE 72000 UNITS: 24000; 76000; 120000 CAPSULE, DELAYED RELEASE PELLETS ORAL at 12:36

## 2017-03-01 RX ADMIN — HYDROMORPHONE HYDROCHLORIDE 1 MG: 10 INJECTION, SOLUTION INTRAMUSCULAR; INTRAVENOUS; SUBCUTANEOUS at 00:10

## 2017-03-01 RX ADMIN — SODIUM CHLORIDE, POTASSIUM CHLORIDE, SODIUM LACTATE AND CALCIUM CHLORIDE: 600; 310; 30; 20 INJECTION, SOLUTION INTRAVENOUS at 11:54

## 2017-03-01 RX ADMIN — SENNOSIDES AND DOCUSATE SODIUM 2 TABLET: 8.6; 5 TABLET ORAL at 20:05

## 2017-03-01 RX ADMIN — HYDROMORPHONE HYDROCHLORIDE 1 MG: 10 INJECTION, SOLUTION INTRAMUSCULAR; INTRAVENOUS; SUBCUTANEOUS at 04:20

## 2017-03-01 RX ADMIN — NICOTINE 1 PATCH: 14 PATCH, EXTENDED RELEASE TRANSDERMAL at 22:13

## 2017-03-01 RX ADMIN — PANTOPRAZOLE SODIUM 40 MG: 40 TABLET, DELAYED RELEASE ORAL at 08:16

## 2017-03-01 RX ADMIN — HEPARIN SODIUM 5000 UNITS: 5000 INJECTION, SOLUTION INTRAVENOUS; SUBCUTANEOUS at 16:06

## 2017-03-01 RX ADMIN — Medication 2 SPRAY: at 08:15

## 2017-03-01 RX ADMIN — SODIUM BICARBONATE 325 MG: 325 TABLET ORAL at 16:22

## 2017-03-01 RX ADMIN — SENNOSIDES AND DOCUSATE SODIUM 1 TABLET: 8.6; 5 TABLET ORAL at 08:16

## 2017-03-01 RX ADMIN — FUROSEMIDE 40 MG: 10 INJECTION, SOLUTION INTRAVENOUS at 08:16

## 2017-03-01 RX ADMIN — PANCRELIPASE 72000 UNITS: 24000; 76000; 120000 CAPSULE, DELAYED RELEASE PELLETS ORAL at 08:12

## 2017-03-01 RX ADMIN — SODIUM CHLORIDE, POTASSIUM CHLORIDE, SODIUM LACTATE AND CALCIUM CHLORIDE: 600; 310; 30; 20 INJECTION, SOLUTION INTRAVENOUS at 20:08

## 2017-03-01 RX ADMIN — SODIUM BICARBONATE 325 MG: 325 TABLET ORAL at 12:37

## 2017-03-01 RX ADMIN — PANCRELIPASE 72000 UNITS: 24000; 76000; 120000 CAPSULE, DELAYED RELEASE PELLETS ORAL at 03:54

## 2017-03-01 RX ADMIN — HYDROMORPHONE HYDROCHLORIDE 0.5 MG: 10 INJECTION, SOLUTION INTRAMUSCULAR; INTRAVENOUS; SUBCUTANEOUS at 15:15

## 2017-03-01 ASSESSMENT — PAIN DESCRIPTION - DESCRIPTORS
DESCRIPTORS: SHARP
DESCRIPTORS: ACHING
DESCRIPTORS: SHARP
DESCRIPTORS: ACHING
DESCRIPTORS: ACHING

## 2017-03-01 NOTE — PLAN OF CARE
Problem: Goal Outcome Summary  Goal: Goal Outcome Summary  Outcome: No Change  Hypertensive and tachy, baseline for pt. Pain comfortably managed with 5mg Percocet Q4 and IV Dilaudid Q3. NJ patent with TF going at goal rate of 85mL/hr.  TF jug in pt fridge, refilling Q4 with Creon/bicarb per order. Greenberg in place with good output. Pt able to reposition self. Frequently diaphoretic, needs linen changes often. IV removed and replaced, SL. Denies nausea, tolerating FLD. Dependent edema, LE lymphedema wraps in place.

## 2017-03-01 NOTE — PROGRESS NOTES
[facility]   Internal Medicine Progress Note     Patient Name: Jayson Bella   : 1970, Age: 46 year old  Admission Date: 2017   Primary Team: Maroon 3     Assessment & Plan     45 yo M with hx of gallstone pancreatitis s/p cholecystectomy in 2016 c/b biliary obstruction due to large pseudocyst s/p ERCP with stent placement 2017 who was transferred from OSH for sepsis, fever, severe leukocytosis concerning for necrotizing pancreatitis.    Changes today:  - start Full Liquid Diet, evaluate to see if he tolerates  - trying to minimize cost to patient as he has no insurance coverage whatsoever  - repeat paracentesis not possible as no good fluid pockets  - start PPI  - transfer to , d/c [facility]    # necrotizing pancreatitis, previously septic  # summer-pancreatic acute fluid collections/walled off necrosis  - , Lipase 1070, Procal 0.43, peak WBC 29.2 on  at OSH, paracentesis at OSH with counts not suggestive of peritonitis  - continue meropenem  - CT abdomen imagining from  and  now in system  - blood Cx collected while pt on chio & vanc (after transfer)  - GI consult, appreciate recs  - monitor for fever, hemodynamic instability, daily CBCs, weekly CRP  - could not repeat paracentesis as pt is without fluid pockets    # resolving JUAN, likely 2/2 intravascular volume depletion in setting of sepsis, with contribution from low albumin  - baseline 0.6, peak 2.1, currently improving with fluids. Discontinue IVF to reduce edematous  - Abd US w/ascites and normal kidneys  - improving with diuresis    # hypervolemia, ascites likely secondary to IVF in setting of sepsis and hypoalbuminemia  - recent weight of 106 kg on   - contnue IV lasix 40 mg IV   - strict I&O, stafford due to urinary retention. Start tamsolusin before d/c'ing stafford  - daily weights    # restart tube feeds  # significant weight loss  - C. Diff negative  - pancreatic enzymes  - monitor  electrolytes, replete prn    # L sided pleural effusion - chest tube removed 2/26  - per chart review, pleural fluid had 1152 nucleated cells, 42 PMNs, LDH of 183, Protein 2.9, and no growth at 3 days. However, LDH and protein were not checked in the serum in or around the date of paracentesis. If using total protein on admission, protein ratio is 0.52 which fulfills criteria for exudative.   - chest tube removed 2/26, f/u xr without evidence of pneumo    # pain  - pt has had significant AMS with trazodone and fentanyl.  - dilaudid IV PRN available  - minimize opioid use  - percocet q4h prn available, preferentially use this    FEN: NJTFs per nutrition, change to full liquid diet and evaluate if he tolerates  Prophylaxis:  DVT: SCDs, heparin subq    Lung: IS, OOB  GI: pantoprazole QDay  Code Status: FULL CODE  Medical Decision Maker: wife    Disposition: pending further evaluation of pancreas and intervention as necessary    Patient seen and discussed with Dr. Rose Domingo MD, PhD  PGY-1, Internal Medicine  [id number]      Interval History     Increased pain overnight and this morning    ROS: 4 point ROS completed and negative except as noted aboveI.       Vitals, Exam, Data     Physical exam:  Temp:  [97.4  F (36.3  C)-98.3  F (36.8  C)] 97.9  F (36.6  C)  Pulse:  [110-117] 110  Heart Rate:  [] 120  Resp:  [18-20] 20  BP: (126-158)/(84-94) 145/85  SpO2:  [91 %-97 %] 91 % on RA    Wt Readings from Last 2 Encounters:   02/28/17 130.1 kg (286 lb 12.8 oz)       Intake/Output Summary (Last 24 hours) at 02/28/17 1904  Last data filed at 02/28/17 1900   Gross per 24 hour   Intake             2145 ml   Output             2595 ml   Net             -450 ml     Physical Exam:   General: Pleasant, resting in bed, some pain  HEENT: mmm, NJ in place  Cardiac: Normal rate, regular rhythm. No m/r/g. Normal S1, S2.  Pulm: decreased at bases, otherwise clear throughout  Abd: Soft, diffusely tender, moderately  distended.  Extremities: 1-2+ Bilateral LE edema, wraps in palce  Neuro: A&Ox3, no focal deficits    CBC    Recent Labs  Lab 02/28/17 0637 02/27/17 0626 02/26/17  0601 02/25/17 2206   WBC 16.3* 16.2* 21.0* 25.3*   HGB 10.3* 9.5* 8.7* 9.7*   MCV 83 84 84 82   * 509* 489* 449       BMP    Recent Labs  Lab 02/28/17 0637 02/27/17 0626 02/26/17  0601 02/25/17 2206    143 141 142   POTASSIUM 4.2 4.0 3.9 3.8   CHLORIDE 103 108 107 107   CO2 29 28 29 28   ANIONGAP 6 7 5 8   * 92 87 95   BUN 36* 33* 33* 33*   CR 1.44* 1.68* 1.83* 1.84*   YAN 7.2* 7.5* 7.2* 7.1*   MAG 1.8 2.0 1.9  --    PHOS 3.0 3.9 3.9  --         INRNo lab results found in last 7 days.    Liver panel    Recent Labs  Lab 02/28/17 0637 02/25/17 2206   PROTTOTAL 5.8* 5.6*   ALBUMIN 1.2* 1.4*   BILITOTAL  --  0.4   ALKPHOS  --  90   AST 31 30   ALT 16 25

## 2017-03-01 NOTE — PLAN OF CARE
Problem: Goal Outcome Summary  Goal: Goal Outcome Summary  OT 7C: per discussion with PT, pt is appropriate for OT to initiate services at this time, will discontinue hold and initiate on 3/2/2017.

## 2017-03-01 NOTE — PLAN OF CARE
Problem: Goal Outcome Summary  Goal: Goal Outcome Summary  Outcome: No Change  Hypertensive, OVSS. On 2.5L of O2. Desats when sleeping. PIV infusing MIVF. TF at 85cc/hr. Stafford with adequate output, stafford cares done. Antibiotics  restarted. CT scheduled for today. Pt up for two walk today. Took a shower today. Full liquid diet.     Addendum: PTA med list needs to be done

## 2017-03-01 NOTE — PLAN OF CARE
Problem: Goal Outcome Summary  Goal: Goal Outcome Summary  Outcome: No Change  Transfer from  this afternoon. Pt is c/o abdominal pain and has been getting his prn po percocet and IV dilaudid every 4 hours and 3 hours. Has been in bed, minimal movement, IS use encouraged. Hypo bowel sound, no BM. Left PIV SL. NJ with continuous enteral feeding at 85 cc/hr tolerating well. BP slightly elevated start of the shift, now improving. Tachycardic on mid 110's. Tmax of 100.2 axillary, not within the parameter to notify MD for the VS. Bilateral LE with lymphedema wrap. Stafford in place with adequate urine volume-stafford cares done. SW consult place for insurance purposes. PLAN: Pain management, TF, VS

## 2017-03-01 NOTE — PROGRESS NOTES
PANCREAS-BILIARY PROGRESS NOTE    ASSESSMENT:  46 year old male with past history depression, anxiety, tobacco abuse, past ETOH abuse, recent history significant for presumed gallstone pancreatitis (12/14/2016) s/p ERCP w sphincterotomy with biliary sludge (12/17/17), laparoscopic cholecystectomy (12/18/17), repeat ERCP with biliary stent (7 cm x 10 F ) for elevated LFT (1/12/17) transferred here from [place], [place]) for further management of necrotizing pancreatitis with acute necrotic collections.   Pancreatitis is complicated by acute necrotic collections, acute kidney injury, ascites and pleural effusions s/p chest tube. Recent CT abdomen did not show any gas in collection therefore unlikely to be infected (however noncontrast), he was on empiric meropenem since 2/14, with negative infectious workup.   Meropenem stopped 2/28. Overnight has had drenching sweats, rising WBC. Pain is stable. Hemodynamically unchanged. Attempted paracentesis on 2/28 without enough fluid to be safely accessed.         RECOMMENDATIONS:    With improving renal function, recommend CT abd/pelvis with IV contrast to assess for matured/maturing drainable collections particularly in the setting of low grade temps with sweats and leukocytosis after stopping meropenem    Continue tube feeds through NJ tube. Ok with clear liquid diet. Agree with pancreatic enzyme supplementation with tube feeds.     Recheck blood cultures    Reasonable to restart meropenem empirically    Hemodynamic resuscitation per primary team    DVT prophylaxis (prefer heparin were procedure to be needed).    Aggressive bowel regimen, consider enema (been 6 days since last BM)    The patient care plan was discussed with Dr. Quevedo, Pancreaticobiliary staff physician.    Piter Wheeler MD  GI Fellow  _______________________________________________________________  S: Overnight Mr. Bella was having significant drenching sweats which have continued this  morning. His pain is a bit better today but appetite remains minimal. No BM for 6 days. He is passing gas. Does not want to get out of bed due to pain associated with this.     O:  Temp:  [97.3  F (36.3  C)-100.2  F (37.9  C)] 97.3  F (36.3  C)  Pulse:  [107-117] 107  Heart Rate:  [120] 120  Resp:  [20-22] 20  BP: (129-158)/(82-97) 145/82  SpO2:  [91 %-95 %] 95 %  GEN: lying in bed, diaphoretic, slight increased work of breathing  HEENT: no scleral icterus, NJ in place  Resp: increased work of breathing  Abd: protuberant, slightly firm, minimally tender in the mid abdomen without rebound or guarding. Hypoactive but present bowel sounds  Ext: legs wrapped in compression bandaging  Neuro: aao x 3    LABS:  BMP  Recent Labs  Lab 03/01/17  0947 02/28/17  0637 02/27/17  0626 02/26/17  0601    138 143 141   POTASSIUM 3.8 4.2 4.0 3.9   CHLORIDE 102 103 108 107   YAN 7.5* 7.2* 7.5* 7.2*   CO2 30 29 28 29   BUN 40* 36* 33* 33*   CR 1.28* 1.44* 1.68* 1.83*   * 120* 92 87     CBC  Recent Labs  Lab 03/01/17  0947 02/28/17  0637 02/27/17  0626 02/26/17  0601   WBC 21.2* 16.3* 16.2* 21.0*   RBC 3.66* 3.92* 3.59* 3.34*   HGB 9.4* 10.3* 9.5* 8.7*   HCT 30.3* 32.5* 30.0* 28.0*   MCV 83 83 84 84   MCH 25.7* 26.3* 26.5 26.0*   MCHC 31.0* 31.7 31.7 31.1*   RDW 16.7* 16.6* 16.6* 16.6*   * 538* 509* 489*     INRNo lab results found in last 7 days.  LFTs  Recent Labs  Lab 02/28/17  0637 02/25/17 2206   ALKPHOS  --  90   AST 31 30   ALT 16 25   BILITOTAL  --  0.4   PROTTOTAL 5.8* 5.6*   ALBUMIN 1.2* 1.4*      PANC  Recent Labs  Lab 02/25/17 2206   LIPASE 1070*   AMYLASE 577*

## 2017-03-01 NOTE — PLAN OF CARE
Problem: Goal Outcome Summary  Goal: Goal Outcome Summary  Edema 7C: Wraps removed and measurements taken with 4/12 gradual reductions bilaterally. Skin remains intact with 2+ pitting distally. Skin cares performed prior to reapplication of GCB from MTPs to knee creases for continued edema management. Remove if causing pain/numbness. Will transition to compression stockings when appropriate with fit for long term management of LE edema.

## 2017-03-01 NOTE — PLAN OF CARE
Problem: Goal Outcome Summary  Goal: Goal Outcome Summary  PT 7C: Engaged pt in bed mobility SBA, sit <> stand CGA, gait with IV pole for 175ft CGA, ascending/descending 1 step x 2 reps with CGA, and seated B UE/LE strengthening exercises. Pt with significant fatigue with all activity and BAH (requires 1L O2 to maintain sats >90%). Pt could benefit from OT evaluation, discussed this with OT, secondary to limited equipment at home and for energy conservation techniques. Please continue to encourage OOB activity and ambulating with FWW (for longer distances). PT anticipating discharge to home with assist from wife and HEP when medically appropriate.

## 2017-03-01 NOTE — PROGRESS NOTES
Brief GI Update post CT     Reviewed by GI service, official read pending. It seems there is a relatively mature collection posterior to the stomach that could be accessed endoscopically. However there is also ascites, some of which appears loculated as well as extensive left upper quadrant inflammation and immature necrosis with a recurrent left pleural effusion.    Recs:  -- restart antibiotics  -- consider lung drainage if respiratory function declining and to get culture data  -- if he deteriorates despite antibiotics will consider EUS guided drainage of collection posterior/inferior to the stomach.    Appreciate excellent cares by primary team    Discussed with Dr. Emy Wheeler MD  Gastroenterology Fellow

## 2017-03-02 ENCOUNTER — ANESTHESIA (OUTPATIENT)
Dept: SURGERY | Facility: CLINIC | Age: 47
End: 2017-03-02

## 2017-03-02 ENCOUNTER — APPOINTMENT (OUTPATIENT)
Dept: OCCUPATIONAL THERAPY | Facility: CLINIC | Age: 47
DRG: 871 | End: 2017-03-02
Attending: HOSPITALIST

## 2017-03-02 ENCOUNTER — ANESTHESIA EVENT (OUTPATIENT)
Dept: SURGERY | Facility: CLINIC | Age: 47
End: 2017-03-02

## 2017-03-02 ENCOUNTER — APPOINTMENT (OUTPATIENT)
Dept: INTERVENTIONAL RADIOLOGY/VASCULAR | Facility: CLINIC | Age: 47
DRG: 871 | End: 2017-03-02
Attending: INTERNAL MEDICINE

## 2017-03-02 LAB
ALBUMIN FLD-MCNC: 1 G/DL
ALBUMIN SERPL-MCNC: 1.2 G/DL (ref 3.4–5)
AMYLASE FLD-CCNC: 7653 U/L
ANION GAP SERPL CALCULATED.3IONS-SCNC: 11 MMOL/L (ref 3–14)
APPEARANCE FLD: NORMAL
BUN SERPL-MCNC: 36 MG/DL (ref 7–30)
CALCIUM SERPL-MCNC: 7.3 MG/DL (ref 8.5–10.1)
CHLORIDE SERPL-SCNC: 105 MMOL/L (ref 94–109)
CO2 SERPL-SCNC: 28 MMOL/L (ref 20–32)
COLOR FLD: NORMAL
CREAT SERPL-MCNC: 1.13 MG/DL (ref 0.66–1.25)
ERYTHROCYTE [DISTWIDTH] IN BLOOD BY AUTOMATED COUNT: 16.8 % (ref 10–15)
GFR SERPL CREATININE-BSD FRML MDRD: 70 ML/MIN/1.7M2
GLUCOSE SERPL-MCNC: 106 MG/DL (ref 70–99)
GRAM STN SPEC: NORMAL
HCT VFR BLD AUTO: 28.5 % (ref 40–53)
HGB BLD-MCNC: 8.9 G/DL (ref 13.3–17.7)
LIPASE FLD-CCNC: NORMAL U/L
MAGNESIUM SERPL-MCNC: 1.7 MG/DL (ref 1.6–2.3)
MCH RBC QN AUTO: 25.9 PG (ref 26.5–33)
MCHC RBC AUTO-ENTMCNC: 31.2 G/DL (ref 31.5–36.5)
MCV RBC AUTO: 83 FL (ref 78–100)
MICRO REPORT STATUS: NORMAL
MONOS+MACROS NFR FLD MANUAL: 7 %
NEUTS BAND NFR FLD MANUAL: 93 %
PLATELET # BLD AUTO: 467 10E9/L (ref 150–450)
POTASSIUM SERPL-SCNC: 3.6 MMOL/L (ref 3.4–5.3)
PROT FLD-MCNC: 3.9 G/DL
RBC # BLD AUTO: 3.44 10E12/L (ref 4.4–5.9)
RBC # FLD: NORMAL /UL
SODIUM SERPL-SCNC: 143 MMOL/L (ref 133–144)
SPECIMEN SOURCE FLD: NORMAL
SPECIMEN SOURCE: NORMAL
WBC # BLD AUTO: 16.5 10E9/L (ref 4–11)
WBC # FLD AUTO: 530 /UL

## 2017-03-02 PROCEDURE — 80048 BASIC METABOLIC PNL TOTAL CA: CPT | Performed by: STUDENT IN AN ORGANIZED HEALTH CARE EDUCATION/TRAINING PROGRAM

## 2017-03-02 PROCEDURE — 87075 CULTR BACTERIA EXCEPT BLOOD: CPT | Performed by: STUDENT IN AN ORGANIZED HEALTH CARE EDUCATION/TRAINING PROGRAM

## 2017-03-02 PROCEDURE — 25000132 ZZH RX MED GY IP 250 OP 250 PS 637: Performed by: INTERNAL MEDICINE

## 2017-03-02 PROCEDURE — 83735 ASSAY OF MAGNESIUM: CPT | Performed by: STUDENT IN AN ORGANIZED HEALTH CARE EDUCATION/TRAINING PROGRAM

## 2017-03-02 PROCEDURE — 97110 THERAPEUTIC EXERCISES: CPT | Mod: GO

## 2017-03-02 PROCEDURE — 97165 OT EVAL LOW COMPLEX 30 MIN: CPT | Mod: GO

## 2017-03-02 PROCEDURE — 12000008 ZZH R&B INTERMEDIATE UMMC

## 2017-03-02 PROCEDURE — 87186 SC STD MICRODIL/AGAR DIL: CPT | Performed by: STUDENT IN AN ORGANIZED HEALTH CARE EDUCATION/TRAINING PROGRAM

## 2017-03-02 PROCEDURE — 99233 SBSQ HOSP IP/OBS HIGH 50: CPT | Performed by: INTERNAL MEDICINE

## 2017-03-02 PROCEDURE — 87102 FUNGUS ISOLATION CULTURE: CPT | Performed by: STUDENT IN AN ORGANIZED HEALTH CARE EDUCATION/TRAINING PROGRAM

## 2017-03-02 PROCEDURE — 40000133 ZZH STATISTIC OT WARD VISIT

## 2017-03-02 PROCEDURE — 85027 COMPLETE CBC AUTOMATED: CPT | Performed by: STUDENT IN AN ORGANIZED HEALTH CARE EDUCATION/TRAINING PROGRAM

## 2017-03-02 PROCEDURE — 27211039 ZZH NEEDLE CR2

## 2017-03-02 PROCEDURE — 84157 ASSAY OF PROTEIN OTHER: CPT | Performed by: STUDENT IN AN ORGANIZED HEALTH CARE EDUCATION/TRAINING PROGRAM

## 2017-03-02 PROCEDURE — 82042 OTHER SOURCE ALBUMIN QUAN EA: CPT | Performed by: STUDENT IN AN ORGANIZED HEALTH CARE EDUCATION/TRAINING PROGRAM

## 2017-03-02 PROCEDURE — 27210903 ZZH KIT CR5

## 2017-03-02 PROCEDURE — 87205 SMEAR GRAM STAIN: CPT | Performed by: STUDENT IN AN ORGANIZED HEALTH CARE EDUCATION/TRAINING PROGRAM

## 2017-03-02 PROCEDURE — 25000128 H RX IP 250 OP 636: Performed by: STUDENT IN AN ORGANIZED HEALTH CARE EDUCATION/TRAINING PROGRAM

## 2017-03-02 PROCEDURE — 82040 ASSAY OF SERUM ALBUMIN: CPT | Performed by: STUDENT IN AN ORGANIZED HEALTH CARE EDUCATION/TRAINING PROGRAM

## 2017-03-02 PROCEDURE — 25000128 H RX IP 250 OP 636: Performed by: INTERNAL MEDICINE

## 2017-03-02 PROCEDURE — 27210732 ZZH ACCESSORY CR1

## 2017-03-02 PROCEDURE — 25000132 ZZH RX MED GY IP 250 OP 250 PS 637: Performed by: STUDENT IN AN ORGANIZED HEALTH CARE EDUCATION/TRAINING PROGRAM

## 2017-03-02 PROCEDURE — 83690 ASSAY OF LIPASE: CPT | Performed by: STUDENT IN AN ORGANIZED HEALTH CARE EDUCATION/TRAINING PROGRAM

## 2017-03-02 PROCEDURE — 82150 ASSAY OF AMYLASE: CPT | Performed by: STUDENT IN AN ORGANIZED HEALTH CARE EDUCATION/TRAINING PROGRAM

## 2017-03-02 PROCEDURE — 97535 SELF CARE MNGMENT TRAINING: CPT | Mod: GO

## 2017-03-02 PROCEDURE — 27210995 ZZH RX 272: Performed by: RADIOLOGY

## 2017-03-02 PROCEDURE — 89051 BODY FLUID CELL COUNT: CPT | Performed by: STUDENT IN AN ORGANIZED HEALTH CARE EDUCATION/TRAINING PROGRAM

## 2017-03-02 PROCEDURE — 25800025 ZZH RX 258: Performed by: STUDENT IN AN ORGANIZED HEALTH CARE EDUCATION/TRAINING PROGRAM

## 2017-03-02 PROCEDURE — 49083 ABD PARACENTESIS W/IMAGING: CPT

## 2017-03-02 PROCEDURE — 87070 CULTURE OTHR SPECIMN AEROBIC: CPT | Performed by: STUDENT IN AN ORGANIZED HEALTH CARE EDUCATION/TRAINING PROGRAM

## 2017-03-02 PROCEDURE — 27210436 ZZH NUTRITION PRODUCT SEMIELEM INTERMED CAN

## 2017-03-02 PROCEDURE — 87077 CULTURE AEROBIC IDENTIFY: CPT | Performed by: STUDENT IN AN ORGANIZED HEALTH CARE EDUCATION/TRAINING PROGRAM

## 2017-03-02 PROCEDURE — 36415 COLL VENOUS BLD VENIPUNCTURE: CPT | Performed by: STUDENT IN AN ORGANIZED HEALTH CARE EDUCATION/TRAINING PROGRAM

## 2017-03-02 RX ORDER — KETOROLAC TROMETHAMINE 30 MG/ML
30 INJECTION, SOLUTION INTRAMUSCULAR; INTRAVENOUS
Status: CANCELLED | OUTPATIENT
Start: 2017-03-02

## 2017-03-02 RX ORDER — INDOMETHACIN 50 MG/1
50 SUPPOSITORY RECTAL ONCE
Status: DISCONTINUED | OUTPATIENT
Start: 2017-03-02 | End: 2017-03-02

## 2017-03-02 RX ORDER — HYDROMORPHONE HYDROCHLORIDE 1 MG/ML
.3-.5 INJECTION, SOLUTION INTRAMUSCULAR; INTRAVENOUS; SUBCUTANEOUS EVERY 5 MIN PRN
Status: CANCELLED | OUTPATIENT
Start: 2017-03-02

## 2017-03-02 RX ORDER — FENTANYL CITRATE 50 UG/ML
25-50 INJECTION, SOLUTION INTRAMUSCULAR; INTRAVENOUS
Status: CANCELLED | OUTPATIENT
Start: 2017-03-02

## 2017-03-02 RX ORDER — MEPERIDINE HYDROCHLORIDE 25 MG/ML
12.5 INJECTION INTRAMUSCULAR; INTRAVENOUS; SUBCUTANEOUS EVERY 5 MIN PRN
Status: CANCELLED | OUTPATIENT
Start: 2017-03-02

## 2017-03-02 RX ADMIN — SODIUM BICARBONATE 325 MG: 325 TABLET ORAL at 17:42

## 2017-03-02 RX ADMIN — MELATONIN TAB 3 MG 3 MG: 3 TAB at 02:58

## 2017-03-02 RX ADMIN — HYDROMORPHONE HYDROCHLORIDE 0.5 MG: 10 INJECTION, SOLUTION INTRAMUSCULAR; INTRAVENOUS; SUBCUTANEOUS at 14:43

## 2017-03-02 RX ADMIN — MELATONIN TAB 3 MG 3 MG: 3 TAB at 23:55

## 2017-03-02 RX ADMIN — OXYCODONE HYDROCHLORIDE AND ACETAMINOPHEN 1 TABLET: 5; 325 TABLET ORAL at 23:55

## 2017-03-02 RX ADMIN — SODIUM BICARBONATE 325 MG: 325 TABLET ORAL at 00:04

## 2017-03-02 RX ADMIN — Medication 2 SPRAY: at 13:08

## 2017-03-02 RX ADMIN — HYDROMORPHONE HYDROCHLORIDE 0.5 MG: 10 INJECTION, SOLUTION INTRAMUSCULAR; INTRAVENOUS; SUBCUTANEOUS at 17:28

## 2017-03-02 RX ADMIN — Medication 2 SPRAY: at 09:35

## 2017-03-02 RX ADMIN — PANCRELIPASE 72000 UNITS: 24000; 76000; 120000 CAPSULE, DELAYED RELEASE PELLETS ORAL at 21:09

## 2017-03-02 RX ADMIN — MEROPENEM 1 G: 1 INJECTION, POWDER, FOR SOLUTION INTRAVENOUS at 04:07

## 2017-03-02 RX ADMIN — SODIUM CHLORIDE, POTASSIUM CHLORIDE, SODIUM LACTATE AND CALCIUM CHLORIDE: 600; 310; 30; 20 INJECTION, SOLUTION INTRAVENOUS at 04:07

## 2017-03-02 RX ADMIN — SODIUM BICARBONATE 325 MG: 325 TABLET ORAL at 04:07

## 2017-03-02 RX ADMIN — HYDROMORPHONE HYDROCHLORIDE 0.5 MG: 10 INJECTION, SOLUTION INTRAMUSCULAR; INTRAVENOUS; SUBCUTANEOUS at 07:58

## 2017-03-02 RX ADMIN — OXYCODONE HYDROCHLORIDE AND ACETAMINOPHEN 1 TABLET: 5; 325 TABLET ORAL at 02:58

## 2017-03-02 RX ADMIN — PANCRELIPASE 72000 UNITS: 24000; 76000; 120000 CAPSULE, DELAYED RELEASE PELLETS ORAL at 00:03

## 2017-03-02 RX ADMIN — LIDOCAINE HYDROCHLORIDE 20 ML: 10 INJECTION, SOLUTION EPIDURAL; INFILTRATION; INTRACAUDAL; PERINEURAL at 16:04

## 2017-03-02 RX ADMIN — Medication 30 ML: at 09:36

## 2017-03-02 RX ADMIN — SODIUM BICARBONATE 325 MG: 325 TABLET ORAL at 09:42

## 2017-03-02 RX ADMIN — HYDROMORPHONE HYDROCHLORIDE 0.5 MG: 10 INJECTION, SOLUTION INTRAMUSCULAR; INTRAVENOUS; SUBCUTANEOUS at 04:12

## 2017-03-02 RX ADMIN — MEROPENEM 1 G: 1 INJECTION, POWDER, FOR SOLUTION INTRAVENOUS at 13:05

## 2017-03-02 RX ADMIN — PANCRELIPASE 72000 UNITS: 24000; 76000; 120000 CAPSULE, DELAYED RELEASE PELLETS ORAL at 17:41

## 2017-03-02 RX ADMIN — SODIUM BICARBONATE 325 MG: 325 TABLET ORAL at 21:08

## 2017-03-02 RX ADMIN — MEROPENEM 1 G: 1 INJECTION, POWDER, FOR SOLUTION INTRAVENOUS at 21:03

## 2017-03-02 RX ADMIN — MULTIVITAMIN 15 ML: LIQUID ORAL at 09:33

## 2017-03-02 RX ADMIN — PANCRELIPASE 72000 UNITS: 24000; 76000; 120000 CAPSULE, DELAYED RELEASE PELLETS ORAL at 09:41

## 2017-03-02 RX ADMIN — HYDROMORPHONE HYDROCHLORIDE 0.5 MG: 10 INJECTION, SOLUTION INTRAMUSCULAR; INTRAVENOUS; SUBCUTANEOUS at 11:32

## 2017-03-02 RX ADMIN — HEPARIN SODIUM 5000 UNITS: 5000 INJECTION, SOLUTION INTRAVENOUS; SUBCUTANEOUS at 04:07

## 2017-03-02 RX ADMIN — PANTOPRAZOLE SODIUM 40 MG: 40 TABLET, DELAYED RELEASE ORAL at 09:34

## 2017-03-02 RX ADMIN — HYDROMORPHONE HYDROCHLORIDE 0.5 MG: 10 INJECTION, SOLUTION INTRAMUSCULAR; INTRAVENOUS; SUBCUTANEOUS at 00:03

## 2017-03-02 RX ADMIN — PANCRELIPASE 72000 UNITS: 24000; 76000; 120000 CAPSULE, DELAYED RELEASE PELLETS ORAL at 04:07

## 2017-03-02 RX ADMIN — HYDROMORPHONE HYDROCHLORIDE 0.5 MG: 10 INJECTION, SOLUTION INTRAMUSCULAR; INTRAVENOUS; SUBCUTANEOUS at 20:58

## 2017-03-02 ASSESSMENT — PAIN DESCRIPTION - DESCRIPTORS
DESCRIPTORS: SORE
DESCRIPTORS: ACHING;SORE

## 2017-03-02 NOTE — PROGRESS NOTES
[facility]   Internal Medicine Progress Note     Patient Name: Jayson Bella   : 1970, Age: 46 year old  Admission Date: 2017   Primary Team: Maroon 3     Assessment & Plan     45 yo M with hx of gallstone pancreatitis s/p cholecystectomy in 2016 c/b biliary obstruction due to large pseudocyst s/p ERCP with stent placement 2017 who was transferred from OSH for sepsis, fever, severe leukocytosis concerning for necrotizing pancreatitis and abscess formation.    Changes today:  - paracentesis today (diagnostic), para labs pending  - stopped IVF today. Plan to restart lasix tomorrow post procedure  - NPO@MN (stop tube feeds, creon, bicarb-- nothing via NJ)  - hold heparin tonight    # necrotizing pancreatitis, previously septic  # summer-pancreatic acute fluid collections/walled off necrosis  - -180, Lipase 1070, Procal 0.43-0.48, peak WBC 29.2 on  at OSH, paracentesis at OSH with counts not suggestive of peritonitis  - restart meropenem  - 3/1/17 CT abd w/IV contrast confirms multiple rim enhancing fluid pockets & GGOs in lung  - blood Cx collected while pt on chio & vanc (after transfer), again on evening of  after afternoon meropenem dose was held  - GI consult, appreciate recs. Plan for EUS/cystogastrostomy on 3/3  - monitor for fever, hemodynamic instability, daily CBCs, QOD CRP, procal    # resolving JUAN, likely 2/2 intravascular volume depletion in setting of sepsis, with contribution from low albumin  -  baseline 0.6, peak 2.1, continues to improve despite CT contrast.  - Abd US w/ascites and normal kidneys  - monitor BMP    # hypervolemia, ascites likely secondary to IVF in setting of sepsis and hypoalbuminemia  - recent weight of 106 kg on   - discontinued IVF today. Will likely start lasix again tomorrow after EUS/cystgastrostomy  - strict I&O, stafford due to urinary retention. Start tamsolusin before d/c'ing stafford  - daily weights    # Severe  malnutrition  - Continue TF (hold tonight for EUS/cystgastrostomy 3/3)  - pancreatic enzymes  - monitor electrolytes, replete prn    # L sided pleural effusion - chest tube removed 2/26  - per chart review, pleural fluid had 1152 nucleated cells, 42 PMNs, LDH of 183, Protein 2.9, and no growth at 3 days. However, LDH and protein were not checked in the serum in or around the date of thoracentesis. If using total protein on admission, protein ratio is 0.52 which fulfills criteria for exudative.   - chest tube removed 2/26, f/u xr without evidence of pneumo  - CT scan 3/1 showing mod amount pleural effusion. Will resume lasix likely 3/3 once kidney function stable after IV contrast. If worsening respiratory failure, consider repeat thoracentesis.     # pain  - pt has had significant AMS with trazodone and fentanyl.  - dilaudid IV PRN available  - percocet q4h prn available, preferentially use this    FEN: NJTFs per nutrition, change to full liquid diet and evaluate if he tolerates  Prophylaxis:  DVT: SCDs, heparin subq    Lung: IS, OOB  GI: pantoprazole QDay  Code Status: FULL CODE  Disposition: pending further evaluation of pancreas and intervention as necessary    Patient seen and discussed with Dr. Rose Domingo MD, PhD  PGY-1, Internal Medicine  [id number]      Interval History     Afebrile overnight. Pain ok. Had BM yesterday with some relief of constipation. Has mild SOB, stable. No CP.     4 point ROS is otherwise negative including Resp, CVS, GI, .      Vitals, Exam, Data     Physical exam:  Temp:  [96.3  F (35.7  C)-98.5  F (36.9  C)] 97.1  F (36.2  C)  Pulse:  [] 103  Heart Rate:  [94-99] 99  Resp:  [18-20] 18  BP: (123-149)/(78-94) 140/86  SpO2:  [91 %-98 %] 91 % on RA    Wt Readings from Last 2 Encounters:   03/02/17 131 kg (288 lb 11.2 oz)       Intake/Output Summary (Last 24 hours) at 03/01/17 1818  Last data filed at 03/01/17 1700   Gross per 24 hour   Intake          2350.42 ml    Output             2825 ml   Net          -474.58 ml     Physical Exam:   General: Pleasant, resting in bed, some pain  HEENT: mmm, NJ in place  Cardiac: Normal rate, regular rhythm. No m/r/g. Normal S1, S2.  Pulm: decreased at bases, otherwise clear throughout  Abd: Soft, diffusely tender, moderately distended.  Extremities: 1-2+ Bilateral LE edema, wraps in palce  Neuro: A&Ox3, no focal deficits    CBC    Recent Labs  Lab 03/02/17  0821 03/01/17  0947 02/28/17  0637 02/27/17  0626   WBC 16.5* 21.2* 16.3* 16.2*   HGB 8.9* 9.4* 10.3* 9.5*   MCV 83 83 83 84   * 477* 538* 509*       BMP    Recent Labs  Lab 03/02/17  0821 03/01/17  0947 02/28/17  0637 02/27/17  0626 02/26/17  0601    137 138 143 141   POTASSIUM 3.6 3.8 4.2 4.0 3.9   CHLORIDE 105 102 103 108 107   CO2 28 30 29 28 29   ANIONGAP 11 6 6 7 5   * 122* 120* 92 87   BUN 36* 40* 36* 33* 33*   CR 1.13 1.28* 1.44* 1.68* 1.83*   YAN 7.3* 7.5* 7.2* 7.5* 7.2*   MAG 1.7 1.8 1.8 2.0 1.9   PHOS  --   --  3.0 3.9 3.9        INRNo lab results found in last 7 days.    Liver panel    Recent Labs  Lab 03/02/17 0821 03/01/17  0947 02/28/17  0637 02/25/17  2206   PROTTOTAL  --  6.1* 5.8* 5.6*   ALBUMIN 1.2* 1.3* 1.2* 1.4*   BILITOTAL  --  0.3  --  0.4   ALKPHOS  --  87  --  90   AST  --  20 31 30   ALT  --  15 16 25     Imaging, CT abd w/contrast 3/1/17:  1. Necrotizing pancreatitis with acute pancreatic collections/walled  off necrosis in the body and the tail of the pancreas. There are  additional rim-enhancing walled off necrosis within the abdomen, at  least one new from prior, , none of these collections contain gas.  2. Moderate left pleural effusion with interval removal of the left  basilar chest tube.  3. Groundglass opacities in the periphery of the right middle lobe  and lingula, concerning for infection versus aspiration.  4. Bibasilar atelectasis/consolidation.     Physician Attestation  I, Emilia Rose MD, saw this patient with the resident  and agree with the resident s findings and plan of care as documented in the resident s note with my edits.     I personally reviewed vital signs, medications, labs and imaging.    Emilia Rose MD  Date of Service (when I saw the patient): 3/2/17

## 2017-03-02 NOTE — PLAN OF CARE
Problem: Goal Outcome Summary  Goal: Goal Outcome Summary  PT 7C: Cancel - Pt OOR for procedure at time of attempt. Will reschedule per POC.

## 2017-03-02 NOTE — PLAN OF CARE
Problem: Goal Outcome Summary  Goal: Goal Outcome Summary  OT 7C: eval completed and tx initiated, OT educated pt on OT role and POC. Pt declining OOB activity this session despite encouragement, minimally agreeable to in bed activity 2/2 increased fatigue. Pt completed 9 different exercises with red theraband after Th demo and explanation. Pt was also provided with written handout on EC/WS techniques and went over collaboratively with pt as fatigue is major limiting factor in functional performance at this time. Pt, Th, and wife has extensive discussion on motivation and self-confidence and how they driectly relate to functional progress and success in therapy. Pt much more receptive after therapeutic listening and conversation.      REC home with assist from spouse with HEP once medically cleared.

## 2017-03-02 NOTE — PROGRESS NOTES
PANCREAS-BILIARY PROGRESS NOTE    ASSESSMENT:  46 year old male with past history depression, anxiety, tobacco abuse, past ETOH abuse, recent history significant for presumed gallstone pancreatitis (12/14/2016) s/p ERCP w sphincterotomy with biliary sludge (12/17/17), laparoscopic cholecystectomy (12/18/17), repeat ERCP with biliary stent (7 cm x 10 F ) for elevated LFT (1/12/17) transferred here from [facility] ([place], [place]) for further management of necrotizing pancreatitis with acute necrotic collections.   Pancreatitis is complicated by acute necrotic collections, acute kidney injury, ascites and pleural effusions s/p chest tube (now removed).  On empiric meropenem since 2/14 (signout from [place] suggested infected ascites though we do not have those records). Off merro on 3/1 developed drenching sweats, rising WBC thus restarted and CT w/IV contrast done. There is a maturing collection inferior to the stomach, partially loculated ascites, recurrent pleural effusion and immature LUQ necrotic collections and inflammation.         RECOMMENDATIONS:    Based on yesterday's CT, will plan for EUS cystgastrostomy 3/3/17.      Would like diagnostic and therapeutic paracentesis with culture, gram stain, amylase/lipase    Continue tube feeds through NJ tube. Ok with clear liquid diet. Agree with pancreatic enzyme supplementation with tube feeds. Please make NPO at midnight tonight.    Continue meropenem    Hemodynamic resuscitation per primary team    DVT prophylaxis (prefer heparin were procedure to be needed), hold this evening and tomorrow morning.    Could also consider a diagnostic thoracentesis      The patient care plan was discussed with Dr. Lamb, Pancreaticobiliary staff physician.    Piter Wheeler MD  GI Fellow  _______________________________________________________________  S: No major events overnight. Pain stable since yesterday. Having bowel movements now. Dyspnea is stable     O:  Temp:  [96.3  F (35.7   C)-98.5  F (36.9  C)] 96.3  F (35.7  C)  Pulse:  [] 99  Heart Rate:  [95] 95  Resp:  [18-20] 20  BP: (123-144)/(78-93) 139/93  SpO2:  [91 %-97 %] 93 %  GEN: lying in bed, diaphoretic, slight increased work of breathing  HEENT: no scleral icterus, NJ in place  Resp: increased work of breathing  Abd: protuberant, slightly firm, minimally tender in the mid abdomen without rebound or guarding. Hypoactive but present bowel sounds  Ext: legs wrapped in compression bandaging  Neuro: aao x 3    LABS:  BMP  Recent Labs  Lab 03/01/17  0947 02/28/17  0637 02/27/17  0626 02/26/17  0601    138 143 141   POTASSIUM 3.8 4.2 4.0 3.9   CHLORIDE 102 103 108 107   YAN 7.5* 7.2* 7.5* 7.2*   CO2 30 29 28 29   BUN 40* 36* 33* 33*   CR 1.28* 1.44* 1.68* 1.83*   * 120* 92 87     CBC  Recent Labs  Lab 03/01/17  0947 02/28/17  0637 02/27/17  0626 02/26/17  0601   WBC 21.2* 16.3* 16.2* 21.0*   RBC 3.66* 3.92* 3.59* 3.34*   HGB 9.4* 10.3* 9.5* 8.7*   HCT 30.3* 32.5* 30.0* 28.0*   MCV 83 83 84 84   MCH 25.7* 26.3* 26.5 26.0*   MCHC 31.0* 31.7 31.7 31.1*   RDW 16.7* 16.6* 16.6* 16.6*   * 538* 509* 489*     INRNo lab results found in last 7 days.  LFTs  Recent Labs  Lab 03/01/17  0947 02/28/17  0637 02/25/17  2206   ALKPHOS 87  --  90   AST 20 31 30   ALT 15 16 25   BILITOTAL 0.3  --  0.4   PROTTOTAL 6.1* 5.8* 5.6*   ALBUMIN 1.3* 1.2* 1.4*      PANC  Recent Labs  Lab 02/25/17  2206   LIPASE 1070*   AMYLASE 577*

## 2017-03-02 NOTE — PROCEDURES
Interventional Radiology Brief Post Procedure Note    Procedure: Diagnostic and therapeutic paracentesis     Proceduralist: Ajit Champagne MD    Assistant: Maycol Jay MD    Time Out: Prior to the start of the procedure and with procedural staff participation, I verbally confirmed the patient s identity using two indicators, relevant allergies, that the procedure was appropriate and matched the consent or emergent situation, and that the correct equipment/implants were available. Immediately prior to starting the procedure I conducted the Time Out with the procedural staff and re-confirmed the patient s name, procedure, and site/side. (The Joint Commission universal protocol was followed.)  Yes    Sedation: None. Local Anesthestic used    Findings: Very small pockets of ascites was seen in RLQ and LLQ.     Estimated Blood Loss: Minimal    Fluoroscopy Time: Not applicable    SPECIMENS: Fluid and/or tissue for laboratory analysis    Complications: 1. None     Condition: Stable    Plan:   - Continue routine care per primary team   - Only 50 mL of dark green colored fluid was able to be removed    Comments: See dictated procedure note for full details.    Maycol Jay MD

## 2017-03-02 NOTE — PLAN OF CARE
Problem: Goal Outcome Summary  Goal: Goal Outcome Summary  Outcome: Therapy, progress towards functional goals is fair  VSS. Pain comfortable managed with Percocet and Dilaudid. Denies nausea. FLD, TF at goal rate of 85mL/hr via NJ. Greenberg patent with adequate output. 1 large loose stool overnight. Up with assist of 1. IV abx per order. Cont POC.

## 2017-03-02 NOTE — PLAN OF CARE
Problem: Discharge Planning  Goal: Discharge Planning (Adult, OB, Behavioral, Peds)  AVSS. Patient up in room, to bathroom, and in canales ambulating with spouse. Gait very steady.  Clear liquid diet ordered but patient didn't attempt to eat on this shift.   Patient tolerated tube feeding fine.  Tube feeding from night shift wasn't complete until after 9:45 so first feeding on day shift was started at 10:00 am and creon and sodium bicarbonate were given at that time.  Surgical MD gave orders to make patient NPO around next dose scheduled so they were charted against and his times will need to be adjusted when he returns.  Orders to discontinue Lactated Ringers.  Pain treated with IV dilaudid. Greenberg cather patent and draining. Patient leaving now to head down stairs for a paracentesis. Will continue POC.

## 2017-03-02 NOTE — PROGRESS NOTES
" 03/02/17 1300   Quick Adds   Type of Visit Initial Occupational Therapy Evaluation   Living Environment   Lives With spouse;child(darvin), dependent   Living Arrangements house   Home Accessibility stairs to enter home;stairs within home;tub/shower is not walk in   Number of Stairs to Enter Home 3   Number of Stairs Within Home 2   Transportation Available car;family or friend will provide   Living Environment Comment Pt lives in a house with his wife and 2 children, aged 7 and 9. pt wife states there are 2 stairs to enter the home and 2 within, 1 step to enter the living room. All other needs can be met on the main level.    Self-Care   Dominant Hand right   Usual Activity Tolerance moderate   Current Activity Tolerance fair   Regular Exercise no   Equipment Currently Used at Home none   Activity/Exercise/Self-Care Comment Pt was not engaging in regular exercise at baseline, states he was \"just doing what he had to do\" around the house.   Functional Level Prior   Ambulation 0-->independent   Transferring 0-->independent   Toileting 0-->independent   Bathing 0-->independent   Dressing 0-->independent   Eating 0-->independent   Communication 0-->understands/communicates without difficulty   Swallowing 0-->swallows foods/liquids without difficulty   Cognition 0 - no cognition issues reported   Fall history within last six months no   Which of the above functional risks had a recent onset or change? ambulation;transferring   Prior Functional Level Comment Pt was previously inddependent in all ADLs during PLOF.    General Information   Onset of Illness/Injury or Date of Surgery - Date 02/25/17   Referring Physician Sara Rolon MD   Patient/Family Goals Statement Return home    Additional Occupational Profile Info/Pertinent History of Current Problem Mr. Bella is a 45yo M with hx of gallstone pancreatitis s/p cholecystectomy in 12/2016 complicated by biliary obstruction secondary to large pseudocyst s/p ERCP with stent " placement in 1/2017 who was transferred from OSH for sepsis with fever and severe leukocytosis attributed to necrotizing pancreatitis.   Precautions/Limitations no known precautions/limitations   Weight-Bearing Status - LUE full weight-bearing   Weight-Bearing Status - RUE full weight-bearing   Weight-Bearing Status - LLE full weight-bearing   Weight-Bearing Status - RLE full weight-bearing   General Info Comments Activity: up with assist    Cognitive Status Examination   Orientation orientation to person, place and time   Level of Consciousness lethargic/somnolent   Able to Follow Commands WNL/WFL   Personal Safety (Cognitive) WNL/WFL   Memory intact   Attention No deficits were identified   Cognitive Comment No deficits identified, pt lethargic at time of eval 2/2 fatigue.    Visual Perception   Visual Perception Wears glasses;No deficits were identified   Sensory Examination   Sensory Comments Pt reports mild numbness and tingling in legs, states no concerns about this however.    Integumentary/Edema   Integumentary/Edema Comments BLEs are swollen with pitting edema, edema orders aleady initiated.    Posture   Posture Comments NT, pt declining OOB activity.    Range of Motion (ROM)   ROM Comment BUEs WFL    Strength   Strength Comments BUEs WFL    Hand Strength   Hand Strength Comments Bilateral  strength WFL    Coordination   Upper Extremity Coordination No deficits were identified   Gross Motor Coordination NT, pt declined OOB activity.    Fine Motor Coordination No deficits identified.    Instrumental Activities of Daily Living (IADL)   Previous Responsibilities meal prep;housekeeping;laundry;medication management;finances;driving;work;shopping;yardwork   IADL Comments Pt was previously independent in all IADLs during PLOF. Pts wife states she would do the majority of grocery shopping and laundry.    Activities of Daily Living Analysis   Impairments Contributing to Impaired Activities of Daily Living  "strength decreased;sensation decreased;pain  (fatigue)   General Therapy Interventions   Planned Therapy Interventions ADL retraining;home program guidelines;strengthening;IADL retraining;other (see comments)  (fatigue management )   Clinical Impression   Criteria for Skilled Therapeutic Interventions Met yes, treatment indicated   OT Diagnosis Decreased ADL-I   Influenced by the following impairments General deconditioning, fatigue, pain, limited activity tolerance.    Assessment of Occupational Performance 3-5 Performance Deficits   Identified Performance Deficits Home management, functional mobility, work, community mobility, driving.    Clinical Decision Making (Complexity) Low complexity   Therapy Frequency daily   Predicted Duration of Therapy Intervention (days/wks) 3/8/2017   Anticipated Discharge Disposition Home with Assist   Risks and Benefits of Treatment have been explained. Yes   Patient, Family & other staff in agreement with plan of care Yes   Clinical Impression Comments Pt presents to OT today with significant deconditioning, fatigue, pain, and diminished activity tolerance, all leading to decreased ADL-I. Pt to benefit from skilled OT services to addres the following problem list.    [facility] AM-PAC  \"6 Clicks\" Daily Activity Inpatient Short Form   1. Putting on and taking off regular lower body clothing? 3 - A Little   2. Bathing (including washing, rinsing, drying)? 3 - A Little   3. Toileting, which includes using toilet, bedpan or urinal? 4 - None   4. Putting on and taking off regular upper body clothing? 4 - None   5. Taking care of personal grooming such as brushing teeth? 4 - None   6. Eating meals? 4 - None   Daily Activity Raw Score (Score out of 24.Lower scores equate to lower levels of function) 22   Total Evaluation Time   Total Evaluation Time (Minutes) 6     "

## 2017-03-02 NOTE — PROGRESS NOTES
[facility]   Internal Medicine Progress Note     Patient Name: Jayson Bella   : 1970, Age: 46 year old  Admission Date: 2017   Primary Team: Maroon 3     Assessment & Plan     45 yo M with hx of gallstone pancreatitis s/p cholecystectomy in 2016 c/b biliary obstruction due to large pseudocyst s/p ERCP with stent placement 2017 who was transferred from OSH for sepsis, fever, severe leukocytosis concerning for necrotizing pancreatitis and abscess formation.    Changes today:  - CT abd w/IV contrast confirms multiple rim enhancing fluid pockets & GGOs in lung  - restarted meropenem empirically  - QOD CRP & procal, both up as are WBCs  - no evidence of developing ascending cholangitis  - start  cc/hr, stop lasix  - increase senna-docusate to 2 tablets BID as pt has significantly increased dilaudid use    # necrotizing pancreatitis, previously septic  # summer-pancreatic acute fluid collections/walled off necrosis  - -180, Lipase 1070, Procal 0.43-0.48, peak WBC 29.2 on  at OSH, paracentesis at OSH with counts not suggestive of peritonitis  - restart meropenem  - 3/1/17 CT abd w/IV contrast confirms multiple rim enhancing fluid pockets & GGOs in lung  - blood Cx collected while pt on chio & vanc (after transfer), again on evening of  after afternoon meropenem dose was held  - GI consult, appreciate recs  - monitor for fever, hemodynamic instability, daily CBCs, QOD CRP, procal    # concern for contrast induced kidney injury after today's CT  # resolving JUAN, likely 2/2 intravascular volume depletion in setting of sepsis, with contribution from low albumin  - baseline 0.6, peak 2.1, improved with fluids & furosemide.  - Abd US w/ascites and normal kidneys  - improving with diuresis  - restart  cc/hr 3/1/17 prior to CT, d/c lasix  - monitor BMP    # hypervolemia, ascites likely secondary to IVF in setting of sepsis and hypoalbuminemia  - recent  weight of 106 kg on 2/13  - discontinue IV lasix today given pt will receive contrast with CT scan  - strict I&O, stafford due to urinary retention. Start tamsolusin before d/c'ing stafford  - daily weights    # Severe malnutrition  - Continue TF  - pancreatic enzymes  - monitor electrolytes, replete prn    # L sided pleural effusion - chest tube removed 2/26  - per chart review, pleural fluid had 1152 nucleated cells, 42 PMNs, LDH of 183, Protein 2.9, and no growth at 3 days. However, LDH and protein were not checked in the serum in or around the date of thoracentesis. If using total protein on admission, protein ratio is 0.52 which fulfills criteria for exudative.   - chest tube removed 2/26, f/u xr without evidence of pneumo  - CT scan 3/1 showing mod amount pleural effusion. Will resume lasix likely tomorrow once kidney function stable after IV contrast. If worsening respiratory failure, consider repeat thoracentesis.     # pain  - pt has had significant AMS with trazodone and fentanyl.  - dilaudid IV PRN available  - minimize opioid use  - percocet q4h prn available, preferentially use this    FEN: NJTFs per nutrition, change to full liquid diet and evaluate if he tolerates  Prophylaxis:  DVT: SCDs, heparin subq    Lung: IS, OOB  GI: pantoprazole QDay  Code Status: FULL CODE  Disposition: pending further evaluation of pancreas and intervention as necessary    Patient seen and discussed with Dr. Rose Domingo MD, PhD  PGY-1, Internal Medicine  [id number]      Interval History     Febrile overnight. Got blood cultures. Feeling better this AM. Feels gassy, like he needs to have a BM. Later in day had BM w/o MOM.     Vitals, Exam, Data     Physical exam:  Temp:  [96.4  F (35.8  C)-100.2  F (37.9  C)] 98.5  F (36.9  C)  Pulse:  [] 96  Resp:  [18-22] 18  BP: (123-156)/(78-97) 123/78  SpO2:  [91 %-97 %] 96 % on RA    Wt Readings from Last 2 Encounters:   02/28/17 130.1 kg (286 lb 12.8 oz)       Intake/Output  Summary (Last 24 hours) at 03/01/17 1818  Last data filed at 03/01/17 1700   Gross per 24 hour   Intake          2350.42 ml   Output             2825 ml   Net          -474.58 ml     Physical Exam:   General: Pleasant, resting in bed, some pain  HEENT: mmm, NJ in place  Cardiac: Normal rate, regular rhythm. No m/r/g. Normal S1, S2.  Pulm: decreased at bases, otherwise clear throughout  Abd: Soft, diffusely tender, moderately distended.  Extremities: 1-2+ Bilateral LE edema, wraps in palce  Neuro: A&Ox3, no focal deficits    CBC    Recent Labs  Lab 03/01/17  0947 02/28/17  0637 02/27/17  0626 02/26/17  0601   WBC 21.2* 16.3* 16.2* 21.0*   HGB 9.4* 10.3* 9.5* 8.7*   MCV 83 83 84 84   * 538* 509* 489*       BMP    Recent Labs  Lab 03/01/17  0947 02/28/17  0637 02/27/17  0626 02/26/17  0601    138 143 141   POTASSIUM 3.8 4.2 4.0 3.9   CHLORIDE 102 103 108 107   CO2 30 29 28 29   ANIONGAP 6 6 7 5   * 120* 92 87   BUN 40* 36* 33* 33*   CR 1.28* 1.44* 1.68* 1.83*   YAN 7.5* 7.2* 7.5* 7.2*   MAG 1.8 1.8 2.0 1.9   PHOS  --  3.0 3.9 3.9        INRNo lab results found in last 7 days.    Liver panel    Recent Labs  Lab 03/01/17  0947 02/28/17  0637 02/25/17  2206   PROTTOTAL 6.1* 5.8* 5.6*   ALBUMIN 1.3* 1.2* 1.4*   BILITOTAL 0.3  --  0.4   ALKPHOS 87  --  90   AST 20 31 30   ALT 15 16 25     Imaging, CT abd w/contrast 3/1/17:  1. Necrotizing pancreatitis with acute pancreatic collections/walled  off necrosis in the body and the tail of the pancreas. There are  additional rim-enhancing walled off necrosis within the abdomen, at  least one new from prior, , none of these collections contain gas.  2. Moderate left pleural effusion with interval removal of the left  basilar chest tube.  3. Groundglass opacities in the periphery of the right middle lobe  and lingula, concerning for infection versus aspiration.  4. Bibasilar atelectasis/consolidation.       Physician Attestation  I, Emilia Rose MD, saw this  patient with the resident and agree with the resident s findings and plan of care as documented in the resident s note with my edits.     I personally reviewed vital signs, medications, labs and imaging.    Emilia oRse MD  Date of Service (when I saw the patient): 3/1/17

## 2017-03-02 NOTE — ANESTHESIA PREPROCEDURE EVALUATION
Anesthesia Evaluation     .        ROS/MED HX    ENT/Pulmonary:  - neg pulmonary ROS     Neurologic:  - neg neurologic ROS     Cardiovascular:         METS/Exercise Tolerance:     Hematologic:  - neg hematologic  ROS       Musculoskeletal:  - neg musculoskeletal ROS       GI/Hepatic:  - neg GI/hepatic ROS       Renal/Genitourinary:  - ROS Renal section negative       Endo:  - neg endo ROS       Psychiatric:  - neg psychiatric ROS       Infectious Disease:  - neg infectious disease ROS       Malignancy:      - no malignancy   Other:    - neg other ROS           Physical Exam      Airway   Mallampati: II  TM distance: >3 FB  Neck ROM: full    Dental     Cardiovascular       Pulmonary                     Anesthesia Plan      History & Physical Review  History and physical reviewed and following examination; no interval change.    ASA Status:  2 .        Plan for General and ETT with Propofol induction. Maintenance will be Balanced.           Postoperative Care  Postoperative pain management:  Multi-modal analgesia.      Consents  Anesthetic plan, risks, benefits and alternatives discussed with:  Patient..                          .

## 2017-03-02 NOTE — PLAN OF CARE
Problem: Goal Outcome Summary  Goal: Goal Outcome Summary  Outcome: Therapy, progress towards functional goals is fair  AFVSS. Pain controlled with IV dilaudid and PO Percocet. Denies nausea. LPIV infusing IVMF. Tube feeding running at 85mL/hr. Greenberg with adequate output. BM in the day shift. No BM this shift but is passing flatus. Was up walking this shift. Family was at the bedside most of the shift. Continue with POC.

## 2017-03-02 NOTE — PROGRESS NOTES
Interventional Radiology Intra-procedural Nursing Note    Patient Name: Jayson Bella  Medical Record Number: [id number]  Today's Date: March 2, 2017    Start Time: 1525  End of procedure time: 1545  Procedure: paracentesis  Report given to: fei RN  Time pt [id number]    Other Notes: pt from 7 c to IR 6. VSS, pt a/o x 3, consent signed by wife and all questions answered at this time. Lido 20 cc, 3 specimens collected and sent to lab.  60 cc fld collected    FADY RAMOS

## 2017-03-03 ENCOUNTER — ANESTHESIA (OUTPATIENT)
Dept: SURGERY | Facility: CLINIC | Age: 47
DRG: 871 | End: 2017-03-03

## 2017-03-03 ENCOUNTER — ANESTHESIA EVENT (OUTPATIENT)
Dept: SURGERY | Facility: CLINIC | Age: 47
DRG: 871 | End: 2017-03-03

## 2017-03-03 ENCOUNTER — APPOINTMENT (OUTPATIENT)
Dept: GENERAL RADIOLOGY | Facility: CLINIC | Age: 47
DRG: 871 | End: 2017-03-03
Attending: INTERNAL MEDICINE

## 2017-03-03 ENCOUNTER — APPOINTMENT (OUTPATIENT)
Dept: GENERAL RADIOLOGY | Facility: CLINIC | Age: 47
DRG: 871 | End: 2017-03-03
Attending: ANESTHESIOLOGY

## 2017-03-03 LAB
AMYLASE FLD-CCNC: NORMAL U/L
ANION GAP SERPL CALCULATED.3IONS-SCNC: 11 MMOL/L (ref 3–14)
BUN SERPL-MCNC: 30 MG/DL (ref 7–30)
CALCIUM SERPL-MCNC: 7.6 MG/DL (ref 8.5–10.1)
CHLORIDE SERPL-SCNC: 103 MMOL/L (ref 94–109)
CO2 SERPL-SCNC: 27 MMOL/L (ref 20–32)
CREAT SERPL-MCNC: 1.04 MG/DL (ref 0.66–1.25)
ERCP: NORMAL
ERYTHROCYTE [DISTWIDTH] IN BLOOD BY AUTOMATED COUNT: 16.7 % (ref 10–15)
GFR SERPL CREATININE-BSD FRML MDRD: 77 ML/MIN/1.7M2
GLUCOSE SERPL-MCNC: 97 MG/DL (ref 70–99)
HCT VFR BLD AUTO: 26.8 % (ref 40–53)
HGB BLD-MCNC: 8.4 G/DL (ref 13.3–17.7)
INR PPP: 1.14 (ref 0.86–1.14)
MCH RBC QN AUTO: 25.8 PG (ref 26.5–33)
MCHC RBC AUTO-ENTMCNC: 31.3 G/DL (ref 31.5–36.5)
MCV RBC AUTO: 83 FL (ref 78–100)
PLATELET # BLD AUTO: 475 10E9/L (ref 150–450)
POTASSIUM SERPL-SCNC: 4 MMOL/L (ref 3.4–5.3)
RBC # BLD AUTO: 3.25 10E12/L (ref 4.4–5.9)
SODIUM SERPL-SCNC: 141 MMOL/L (ref 133–144)
SPECIMEN SOURCE FLD: NORMAL
UPPER EUS: NORMAL
WBC # BLD AUTO: 13.4 10E9/L (ref 4–11)

## 2017-03-03 PROCEDURE — 85610 PROTHROMBIN TIME: CPT | Performed by: INTERNAL MEDICINE

## 2017-03-03 PROCEDURE — C9399 UNCLASSIFIED DRUGS OR BIOLOG: HCPCS | Performed by: NURSE ANESTHETIST, CERTIFIED REGISTERED

## 2017-03-03 PROCEDURE — 25500064 ZZH RX 255 OP 636: Performed by: INTERNAL MEDICINE

## 2017-03-03 PROCEDURE — 25000125 ZZHC RX 250: Performed by: NURSE ANESTHETIST, CERTIFIED REGISTERED

## 2017-03-03 PROCEDURE — 25000128 H RX IP 250 OP 636: Performed by: NURSE ANESTHETIST, CERTIFIED REGISTERED

## 2017-03-03 PROCEDURE — 25000132 ZZH RX MED GY IP 250 OP 250 PS 637: Performed by: INTERNAL MEDICINE

## 2017-03-03 PROCEDURE — 87181 SC STD AGAR DILUTION PER AGT: CPT | Performed by: INTERNAL MEDICINE

## 2017-03-03 PROCEDURE — 40000277 XR SURGERY CARM FLUORO LESS THAN 5 MIN W STILLS: Mod: TC

## 2017-03-03 PROCEDURE — 25000566 ZZH SEVOFLURANE, EA 15 MIN: Performed by: INTERNAL MEDICINE

## 2017-03-03 PROCEDURE — 85027 COMPLETE CBC AUTOMATED: CPT | Performed by: INTERNAL MEDICINE

## 2017-03-03 PROCEDURE — 36415 COLL VENOUS BLD VENIPUNCTURE: CPT | Performed by: STUDENT IN AN ORGANIZED HEALTH CARE EDUCATION/TRAINING PROGRAM

## 2017-03-03 PROCEDURE — 99233 SBSQ HOSP IP/OBS HIGH 50: CPT | Mod: GC | Performed by: INTERNAL MEDICINE

## 2017-03-03 PROCEDURE — 25000128 H RX IP 250 OP 636: Performed by: INTERNAL MEDICINE

## 2017-03-03 PROCEDURE — 12000006 ZZH R&B IMCU INTERMEDIATE UMMC

## 2017-03-03 PROCEDURE — 87186 SC STD MICRODIL/AGAR DIL: CPT | Performed by: INTERNAL MEDICINE

## 2017-03-03 PROCEDURE — P9041 ALBUMIN (HUMAN),5%, 50ML: HCPCS | Performed by: NURSE ANESTHETIST, CERTIFIED REGISTERED

## 2017-03-03 PROCEDURE — 87106 FUNGI IDENTIFICATION YEAST: CPT | Performed by: INTERNAL MEDICINE

## 2017-03-03 PROCEDURE — 71000015 ZZH RECOVERY PHASE 1 LEVEL 2 EA ADDTL HR: Performed by: INTERNAL MEDICINE

## 2017-03-03 PROCEDURE — 82150 ASSAY OF AMYLASE: CPT | Performed by: INTERNAL MEDICINE

## 2017-03-03 PROCEDURE — C1876 STENT, NON-COA/NON-COV W/DEL: HCPCS | Performed by: INTERNAL MEDICINE

## 2017-03-03 PROCEDURE — 37000008 ZZH ANESTHESIA TECHNICAL FEE, 1ST 30 MIN: Performed by: INTERNAL MEDICINE

## 2017-03-03 PROCEDURE — 36000053 ZZH SURGERY LEVEL 2 EA 15 ADDTL MIN - UMMC: Performed by: INTERNAL MEDICINE

## 2017-03-03 PROCEDURE — 27210794 ZZH OR GENERAL SUPPLY STERILE: Performed by: INTERNAL MEDICINE

## 2017-03-03 PROCEDURE — 87075 CULTR BACTERIA EXCEPT BLOOD: CPT | Performed by: INTERNAL MEDICINE

## 2017-03-03 PROCEDURE — C1725 CATH, TRANSLUMIN NON-LASER: HCPCS | Performed by: INTERNAL MEDICINE

## 2017-03-03 PROCEDURE — C1769 GUIDE WIRE: HCPCS | Performed by: INTERNAL MEDICINE

## 2017-03-03 PROCEDURE — 25000128 H RX IP 250 OP 636: Performed by: STUDENT IN AN ORGANIZED HEALTH CARE EDUCATION/TRAINING PROGRAM

## 2017-03-03 PROCEDURE — 71010 XR CHEST PORT 1 VW: CPT

## 2017-03-03 PROCEDURE — 36000051 ZZH SURGERY LEVEL 2 1ST 30 MIN - UMMC: Performed by: INTERNAL MEDICINE

## 2017-03-03 PROCEDURE — 36415 COLL VENOUS BLD VENIPUNCTURE: CPT | Performed by: INTERNAL MEDICINE

## 2017-03-03 PROCEDURE — 87077 CULTURE AEROBIC IDENTIFY: CPT | Performed by: INTERNAL MEDICINE

## 2017-03-03 PROCEDURE — 25000132 ZZH RX MED GY IP 250 OP 250 PS 637: Performed by: STUDENT IN AN ORGANIZED HEALTH CARE EDUCATION/TRAINING PROGRAM

## 2017-03-03 PROCEDURE — 0F798DZ DILATION OF COMMON BILE DUCT WITH INTRALUMINAL DEVICE, VIA NATURAL OR ARTIFICIAL OPENING ENDOSCOPIC: ICD-10-PCS | Performed by: INTERNAL MEDICINE

## 2017-03-03 PROCEDURE — 87070 CULTURE OTHR SPECIMN AEROBIC: CPT | Performed by: INTERNAL MEDICINE

## 2017-03-03 PROCEDURE — 40000170 ZZH STATISTIC PRE-PROCEDURE ASSESSMENT II: Performed by: INTERNAL MEDICINE

## 2017-03-03 PROCEDURE — 71000014 ZZH RECOVERY PHASE 1 LEVEL 2 FIRST HR: Performed by: INTERNAL MEDICINE

## 2017-03-03 PROCEDURE — 0W9G3ZZ DRAINAGE OF PERITONEAL CAVITY, PERCUTANEOUS APPROACH: ICD-10-PCS | Performed by: INTERNAL MEDICINE

## 2017-03-03 PROCEDURE — 27210436 ZZH NUTRITION PRODUCT SEMIELEM INTERMED CAN

## 2017-03-03 PROCEDURE — 25800025 ZZH RX 258: Performed by: NURSE ANESTHETIST, CERTIFIED REGISTERED

## 2017-03-03 PROCEDURE — 25000128 H RX IP 250 OP 636: Performed by: ANESTHESIOLOGY

## 2017-03-03 PROCEDURE — 0FC98ZZ EXTIRPATION OF MATTER FROM COMMON BILE DUCT, VIA NATURAL OR ARTIFICIAL OPENING ENDOSCOPIC: ICD-10-PCS | Performed by: INTERNAL MEDICINE

## 2017-03-03 PROCEDURE — 37000009 ZZH ANESTHESIA TECHNICAL FEE, EACH ADDTL 15 MIN: Performed by: INTERNAL MEDICINE

## 2017-03-03 PROCEDURE — 80048 BASIC METABOLIC PNL TOTAL CA: CPT | Performed by: STUDENT IN AN ORGANIZED HEALTH CARE EDUCATION/TRAINING PROGRAM

## 2017-03-03 DEVICE — STENT SOLUS BILIARY 10FRX01CM DBL PIGTAIL W/INTRO G26829
Type: IMPLANTABLE DEVICE | Site: BILE DUCT | Status: NON-FUNCTIONAL
Removed: 2017-03-07

## 2017-03-03 DEVICE — STENT ESU AXIOS W/DEL SYS 15MMX10MM 10.8FR 138CM M00553650
Type: IMPLANTABLE DEVICE | Site: BILE DUCT | Status: NON-FUNCTIONAL
Removed: 2017-03-07

## 2017-03-03 RX ORDER — ONDANSETRON 2 MG/ML
4 INJECTION INTRAMUSCULAR; INTRAVENOUS EVERY 30 MIN PRN
Status: DISCONTINUED | OUTPATIENT
Start: 2017-03-03 | End: 2017-03-03 | Stop reason: HOSPADM

## 2017-03-03 RX ORDER — POTASSIUM CHLORIDE 7.45 MG/ML
10 INJECTION INTRAVENOUS
Status: DISCONTINUED | OUTPATIENT
Start: 2017-03-03 | End: 2017-03-16

## 2017-03-03 RX ORDER — SODIUM CHLORIDE, SODIUM LACTATE, POTASSIUM CHLORIDE, CALCIUM CHLORIDE 600; 310; 30; 20 MG/100ML; MG/100ML; MG/100ML; MG/100ML
INJECTION, SOLUTION INTRAVENOUS CONTINUOUS
Status: DISCONTINUED | OUTPATIENT
Start: 2017-03-03 | End: 2017-03-03

## 2017-03-03 RX ORDER — POTASSIUM CHLORIDE 750 MG/1
20-40 TABLET, EXTENDED RELEASE ORAL
Status: DISCONTINUED | OUTPATIENT
Start: 2017-03-03 | End: 2017-03-16

## 2017-03-03 RX ORDER — FLUMAZENIL 0.1 MG/ML
0.2 INJECTION, SOLUTION INTRAVENOUS
Status: ACTIVE | OUTPATIENT
Start: 2017-03-03 | End: 2017-03-04

## 2017-03-03 RX ORDER — POTASSIUM CHLORIDE 1.5 G/1.58G
20-40 POWDER, FOR SOLUTION ORAL
Status: DISCONTINUED | OUTPATIENT
Start: 2017-03-03 | End: 2017-03-16

## 2017-03-03 RX ORDER — MEPERIDINE HYDROCHLORIDE 25 MG/ML
12.5 INJECTION INTRAMUSCULAR; INTRAVENOUS; SUBCUTANEOUS EVERY 5 MIN PRN
Status: DISCONTINUED | OUTPATIENT
Start: 2017-03-03 | End: 2017-03-03 | Stop reason: HOSPADM

## 2017-03-03 RX ORDER — PROPOFOL 10 MG/ML
INJECTION, EMULSION INTRAVENOUS PRN
Status: DISCONTINUED | OUTPATIENT
Start: 2017-03-03 | End: 2017-03-03

## 2017-03-03 RX ORDER — ONDANSETRON 4 MG/1
4 TABLET, ORALLY DISINTEGRATING ORAL EVERY 30 MIN PRN
Status: DISCONTINUED | OUTPATIENT
Start: 2017-03-03 | End: 2017-03-03 | Stop reason: HOSPADM

## 2017-03-03 RX ORDER — LANOLIN ALCOHOL/MO/W.PET/CERES
3 CREAM (GRAM) TOPICAL AT BEDTIME
Status: DISCONTINUED | OUTPATIENT
Start: 2017-03-03 | End: 2017-03-09

## 2017-03-03 RX ORDER — ALBUMIN, HUMAN INJ 5% 5 %
SOLUTION INTRAVENOUS CONTINUOUS PRN
Status: DISCONTINUED | OUTPATIENT
Start: 2017-03-03 | End: 2017-03-03

## 2017-03-03 RX ORDER — IOPAMIDOL 408 MG/ML
INJECTION, SOLUTION INTRAVASCULAR PRN
Status: DISCONTINUED | OUTPATIENT
Start: 2017-03-03 | End: 2017-03-03 | Stop reason: HOSPADM

## 2017-03-03 RX ORDER — SODIUM CHLORIDE, SODIUM LACTATE, POTASSIUM CHLORIDE, CALCIUM CHLORIDE 600; 310; 30; 20 MG/100ML; MG/100ML; MG/100ML; MG/100ML
INJECTION, SOLUTION INTRAVENOUS CONTINUOUS
Status: DISCONTINUED | OUTPATIENT
Start: 2017-03-03 | End: 2017-03-03 | Stop reason: HOSPADM

## 2017-03-03 RX ORDER — QUETIAPINE FUMARATE 25 MG/1
25 TABLET, FILM COATED ORAL
Status: COMPLETED | OUTPATIENT
Start: 2017-03-03 | End: 2017-03-06

## 2017-03-03 RX ORDER — DEXAMETHASONE SODIUM PHOSPHATE 4 MG/ML
INJECTION, SOLUTION INTRA-ARTICULAR; INTRALESIONAL; INTRAMUSCULAR; INTRAVENOUS; SOFT TISSUE PRN
Status: DISCONTINUED | OUTPATIENT
Start: 2017-03-03 | End: 2017-03-03

## 2017-03-03 RX ORDER — NALOXONE HYDROCHLORIDE 0.4 MG/ML
.1-.4 INJECTION, SOLUTION INTRAMUSCULAR; INTRAVENOUS; SUBCUTANEOUS
Status: ACTIVE | OUTPATIENT
Start: 2017-03-03 | End: 2017-03-04

## 2017-03-03 RX ORDER — LIDOCAINE 40 MG/G
CREAM TOPICAL
Status: DISCONTINUED | OUTPATIENT
Start: 2017-03-03 | End: 2017-03-18 | Stop reason: HOSPADM

## 2017-03-03 RX ORDER — MAGNESIUM SULFATE HEPTAHYDRATE 40 MG/ML
4 INJECTION, SOLUTION INTRAVENOUS EVERY 4 HOURS PRN
Status: DISCONTINUED | OUTPATIENT
Start: 2017-03-03 | End: 2017-03-16

## 2017-03-03 RX ORDER — POTASSIUM CHLORIDE 29.8 MG/ML
20 INJECTION INTRAVENOUS
Status: DISCONTINUED | OUTPATIENT
Start: 2017-03-03 | End: 2017-03-16

## 2017-03-03 RX ORDER — ONDANSETRON 2 MG/ML
INJECTION INTRAMUSCULAR; INTRAVENOUS PRN
Status: DISCONTINUED | OUTPATIENT
Start: 2017-03-03 | End: 2017-03-03

## 2017-03-03 RX ORDER — SODIUM CHLORIDE, SODIUM LACTATE, POTASSIUM CHLORIDE, CALCIUM CHLORIDE 600; 310; 30; 20 MG/100ML; MG/100ML; MG/100ML; MG/100ML
INJECTION, SOLUTION INTRAVENOUS CONTINUOUS PRN
Status: DISCONTINUED | OUTPATIENT
Start: 2017-03-03 | End: 2017-03-03

## 2017-03-03 RX ORDER — FUROSEMIDE 10 MG/ML
20 INJECTION INTRAMUSCULAR; INTRAVENOUS
Status: DISCONTINUED | OUTPATIENT
Start: 2017-03-03 | End: 2017-03-05

## 2017-03-03 RX ORDER — FUROSEMIDE 10 MG/ML
10 INJECTION INTRAMUSCULAR; INTRAVENOUS ONCE
Status: COMPLETED | OUTPATIENT
Start: 2017-03-03 | End: 2017-03-03

## 2017-03-03 RX ORDER — FENTANYL CITRATE 50 UG/ML
INJECTION, SOLUTION INTRAMUSCULAR; INTRAVENOUS PRN
Status: DISCONTINUED | OUTPATIENT
Start: 2017-03-03 | End: 2017-03-03

## 2017-03-03 RX ORDER — FENTANYL CITRATE 50 UG/ML
25-50 INJECTION, SOLUTION INTRAMUSCULAR; INTRAVENOUS
Status: DISCONTINUED | OUTPATIENT
Start: 2017-03-03 | End: 2017-03-03 | Stop reason: HOSPADM

## 2017-03-03 RX ORDER — FUROSEMIDE 10 MG/ML
30 INJECTION INTRAMUSCULAR; INTRAVENOUS ONCE
Status: COMPLETED | OUTPATIENT
Start: 2017-03-03 | End: 2017-03-03

## 2017-03-03 RX ORDER — HYDROMORPHONE HYDROCHLORIDE 1 MG/ML
.3-.5 INJECTION, SOLUTION INTRAMUSCULAR; INTRAVENOUS; SUBCUTANEOUS EVERY 5 MIN PRN
Status: DISCONTINUED | OUTPATIENT
Start: 2017-03-03 | End: 2017-03-03 | Stop reason: HOSPADM

## 2017-03-03 RX ORDER — KETOROLAC TROMETHAMINE 30 MG/ML
30 INJECTION, SOLUTION INTRAMUSCULAR; INTRAVENOUS
Status: DISCONTINUED | OUTPATIENT
Start: 2017-03-03 | End: 2017-03-03 | Stop reason: HOSPADM

## 2017-03-03 RX ADMIN — PHENYLEPHRINE HYDROCHLORIDE 100 MCG: 10 INJECTION, SOLUTION INTRAMUSCULAR; INTRAVENOUS; SUBCUTANEOUS at 10:04

## 2017-03-03 RX ADMIN — MIDAZOLAM HYDROCHLORIDE 1 MG: 1 INJECTION, SOLUTION INTRAMUSCULAR; INTRAVENOUS at 08:41

## 2017-03-03 RX ADMIN — HYDROMORPHONE HYDROCHLORIDE 0.3 MG: 10 INJECTION, SOLUTION INTRAMUSCULAR; INTRAVENOUS; SUBCUTANEOUS at 13:01

## 2017-03-03 RX ADMIN — PHENYLEPHRINE HYDROCHLORIDE 200 MCG: 10 INJECTION, SOLUTION INTRAMUSCULAR; INTRAVENOUS; SUBCUTANEOUS at 09:27

## 2017-03-03 RX ADMIN — Medication 30 ML: at 21:54

## 2017-03-03 RX ADMIN — FENTANYL CITRATE 100 MCG: 50 INJECTION, SOLUTION INTRAMUSCULAR; INTRAVENOUS at 08:41

## 2017-03-03 RX ADMIN — PHENYLEPHRINE HYDROCHLORIDE 150 MCG: 10 INJECTION, SOLUTION INTRAMUSCULAR; INTRAVENOUS; SUBCUTANEOUS at 08:52

## 2017-03-03 RX ADMIN — MEROPENEM 1 G: 1 INJECTION, POWDER, FOR SOLUTION INTRAVENOUS at 03:57

## 2017-03-03 RX ADMIN — PHENYLEPHRINE HYDROCHLORIDE 150 MCG: 10 INJECTION, SOLUTION INTRAMUSCULAR; INTRAVENOUS; SUBCUTANEOUS at 09:19

## 2017-03-03 RX ADMIN — PROPOFOL 150 MG: 10 INJECTION, EMULSION INTRAVENOUS at 08:41

## 2017-03-03 RX ADMIN — SENNOSIDES AND DOCUSATE SODIUM 2 TABLET: 8.6; 5 TABLET ORAL at 19:39

## 2017-03-03 RX ADMIN — PHENYLEPHRINE HYDROCHLORIDE 100 MCG: 10 INJECTION, SOLUTION INTRAMUSCULAR; INTRAVENOUS; SUBCUTANEOUS at 09:14

## 2017-03-03 RX ADMIN — ROCURONIUM BROMIDE 10 MG: 10 INJECTION INTRAVENOUS at 09:33

## 2017-03-03 RX ADMIN — PHENYLEPHRINE HYDROCHLORIDE 200 MCG: 10 INJECTION, SOLUTION INTRAMUSCULAR; INTRAVENOUS; SUBCUTANEOUS at 09:30

## 2017-03-03 RX ADMIN — HYDROMORPHONE HYDROCHLORIDE 0.3 MG: 10 INJECTION, SOLUTION INTRAMUSCULAR; INTRAVENOUS; SUBCUTANEOUS at 15:25

## 2017-03-03 RX ADMIN — PHENYLEPHRINE HYDROCHLORIDE 200 MCG: 10 INJECTION, SOLUTION INTRAMUSCULAR; INTRAVENOUS; SUBCUTANEOUS at 09:25

## 2017-03-03 RX ADMIN — MEROPENEM 1 G: 1 INJECTION, POWDER, FOR SOLUTION INTRAVENOUS at 19:39

## 2017-03-03 RX ADMIN — DEXAMETHASONE SODIUM PHOSPHATE 8 MG: 4 INJECTION, SOLUTION INTRAMUSCULAR; INTRAVENOUS at 08:55

## 2017-03-03 RX ADMIN — FUROSEMIDE 30 MG: 10 INJECTION, SOLUTION INTRAVENOUS at 12:50

## 2017-03-03 RX ADMIN — HYDROMORPHONE HYDROCHLORIDE 0.3 MG: 10 INJECTION, SOLUTION INTRAMUSCULAR; INTRAVENOUS; SUBCUTANEOUS at 11:08

## 2017-03-03 RX ADMIN — PHENYLEPHRINE HYDROCHLORIDE 150 MCG: 10 INJECTION, SOLUTION INTRAMUSCULAR; INTRAVENOUS; SUBCUTANEOUS at 09:22

## 2017-03-03 RX ADMIN — MELATONIN TAB 3 MG 3 MG: 3 TAB at 21:55

## 2017-03-03 RX ADMIN — ROCURONIUM BROMIDE 50 MG: 10 INJECTION INTRAVENOUS at 08:45

## 2017-03-03 RX ADMIN — FUROSEMIDE 20 MG: 10 INJECTION, SOLUTION INTRAVENOUS at 19:39

## 2017-03-03 RX ADMIN — HYDROMORPHONE HYDROCHLORIDE 0.5 MG: 10 INJECTION, SOLUTION INTRAMUSCULAR; INTRAVENOUS; SUBCUTANEOUS at 23:59

## 2017-03-03 RX ADMIN — ONDANSETRON 4 MG: 2 INJECTION INTRAMUSCULAR; INTRAVENOUS at 08:55

## 2017-03-03 RX ADMIN — ROCURONIUM BROMIDE 20 MG: 10 INJECTION INTRAVENOUS at 09:37

## 2017-03-03 RX ADMIN — PHENYLEPHRINE HYDROCHLORIDE 100 MCG: 10 INJECTION, SOLUTION INTRAMUSCULAR; INTRAVENOUS; SUBCUTANEOUS at 09:05

## 2017-03-03 RX ADMIN — PHENYLEPHRINE HYDROCHLORIDE 100 MCG: 10 INJECTION, SOLUTION INTRAMUSCULAR; INTRAVENOUS; SUBCUTANEOUS at 09:54

## 2017-03-03 RX ADMIN — PHENYLEPHRINE HYDROCHLORIDE 100 MCG: 10 INJECTION, SOLUTION INTRAMUSCULAR; INTRAVENOUS; SUBCUTANEOUS at 08:45

## 2017-03-03 RX ADMIN — PANCRELIPASE 24000 UNITS: 24000; 76000; 120000 CAPSULE, DELAYED RELEASE PELLETS ORAL at 21:54

## 2017-03-03 RX ADMIN — SODIUM CHLORIDE, POTASSIUM CHLORIDE, SODIUM LACTATE AND CALCIUM CHLORIDE: 600; 310; 30; 20 INJECTION, SOLUTION INTRAVENOUS at 08:29

## 2017-03-03 RX ADMIN — HYDROMORPHONE HYDROCHLORIDE 0.5 MG: 10 INJECTION, SOLUTION INTRAMUSCULAR; INTRAVENOUS; SUBCUTANEOUS at 06:19

## 2017-03-03 RX ADMIN — SUCCINYLCHOLINE CHLORIDE 200 MG: 20 INJECTION, SOLUTION INTRAMUSCULAR; INTRAVENOUS at 08:41

## 2017-03-03 RX ADMIN — SUGAMMADEX 200 MG: 100 INJECTION, SOLUTION INTRAVENOUS at 10:20

## 2017-03-03 RX ADMIN — OXYCODONE HYDROCHLORIDE AND ACETAMINOPHEN 1 TABLET: 5; 325 TABLET ORAL at 22:04

## 2017-03-03 RX ADMIN — PHENYLEPHRINE HYDROCHLORIDE 100 MCG: 10 INJECTION, SOLUTION INTRAMUSCULAR; INTRAVENOUS; SUBCUTANEOUS at 09:16

## 2017-03-03 RX ADMIN — HYDROMORPHONE HYDROCHLORIDE 0.5 MG: 10 INJECTION, SOLUTION INTRAMUSCULAR; INTRAVENOUS; SUBCUTANEOUS at 03:02

## 2017-03-03 RX ADMIN — PHENYLEPHRINE HYDROCHLORIDE 100 MCG: 10 INJECTION, SOLUTION INTRAMUSCULAR; INTRAVENOUS; SUBCUTANEOUS at 08:41

## 2017-03-03 RX ADMIN — PHENYLEPHRINE HYDROCHLORIDE 100 MCG: 10 INJECTION, SOLUTION INTRAMUSCULAR; INTRAVENOUS; SUBCUTANEOUS at 09:11

## 2017-03-03 RX ADMIN — HYDROMORPHONE HYDROCHLORIDE 0.5 MG: 10 INJECTION, SOLUTION INTRAMUSCULAR; INTRAVENOUS; SUBCUTANEOUS at 18:25

## 2017-03-03 RX ADMIN — MIDAZOLAM HYDROCHLORIDE 1 MG: 1 INJECTION, SOLUTION INTRAMUSCULAR; INTRAVENOUS at 08:33

## 2017-03-03 RX ADMIN — PHENYLEPHRINE HYDROCHLORIDE 100 MCG: 10 INJECTION, SOLUTION INTRAMUSCULAR; INTRAVENOUS; SUBCUTANEOUS at 09:45

## 2017-03-03 RX ADMIN — ROCURONIUM BROMIDE 20 MG: 10 INJECTION INTRAVENOUS at 09:02

## 2017-03-03 RX ADMIN — PHENYLEPHRINE HYDROCHLORIDE 200 MCG: 10 INJECTION, SOLUTION INTRAMUSCULAR; INTRAVENOUS; SUBCUTANEOUS at 09:32

## 2017-03-03 RX ADMIN — SODIUM BICARBONATE 325 MG: 325 TABLET ORAL at 21:53

## 2017-03-03 RX ADMIN — PHENYLEPHRINE HYDROCHLORIDE 100 MCG: 10 INJECTION, SOLUTION INTRAMUSCULAR; INTRAVENOUS; SUBCUTANEOUS at 09:09

## 2017-03-03 RX ADMIN — FUROSEMIDE 10 MG: 10 INJECTION, SOLUTION INTRAVENOUS at 12:13

## 2017-03-03 RX ADMIN — MEROPENEM 1 G: 1 INJECTION, POWDER, FOR SOLUTION INTRAVENOUS at 14:23

## 2017-03-03 RX ADMIN — ALBUMIN (HUMAN): 12.5 SOLUTION INTRAVENOUS at 08:45

## 2017-03-03 ASSESSMENT — PAIN DESCRIPTION - DESCRIPTORS
DESCRIPTORS: PRESSURE
DESCRIPTORS: PRESSURE
DESCRIPTORS: SORE
DESCRIPTORS: PRESSURE
DESCRIPTORS: ACHING;SORE
DESCRIPTORS: PRESSURE
DESCRIPTORS: SORE;ACHING;TENDER
DESCRIPTORS: PRESSURE

## 2017-03-03 NOTE — PROGRESS NOTES
CLINICAL NUTRITION SERVICES - REASSESSMENT NOTE     Nutrition Prescription    RECOMMENDATIONS FOR MDs/PROVIDERS TO ORDER:  -Once diet advances to full liquids or greater recommend starting oral enzymes for meals/snacks/supplements.   -Once diet at least full liquids please order ensure supplements and calorie counts to assess PO intake.  -Recommend ADAT (per team discretion) to low fat in setting of pancreatitis    Malnutrition Status:    -Unable to assess    Recommendations already ordered by Registered Dietitian (RD):  -Continue current TF regimen: Impact Peptide 1.5 @ 85mL/hr x 24 hours per day. Impact Peptide @ goal 85 ml/hr (2040 ml/day) to provide 3060 kcals, 192 g PRO, 1571 ml free H2O, 131 g Fat (50% from MCTs), 286 g CHO and no Fiber daily.  -Continue enzymes (Creon 24- 3 capsules) mixed with sodium bicarb every 4 hours (see orders).    Future/Additional Recommendations:  -Recommend PO enzyme regimen as follows: Creon 24-3 capsules with small meals/snacks/supplements, Creon 24 4 capsules with regular meals. Will need to monitor tolerance and adjust as needed.     EVALUATION OF THE PROGRESS TOWARD GOALS   Diet: NPO-for procedure, previously on full liquid diet since .  Nutrition Support: TF started pm  and advanced to goal as of . Goal regimen: Impact Peptide 1.5 @ 85mL/hr x 24 hours per day. Average EN since admission (1123mL): 1685kcals and 106g protein- this meets 64% of calorie needs and 67% of protein needs.  TF mixed with enzymes/bicarb  Intake: good PO intake per nursing documentation on full liquid diet    NEW FINDINGS   -Pt currently in PACU.  -3/3: OP: Endoscopic Ultrasound, Cyst Gastrostomy with Stent Placement x2 and Dilation     -Weight trending up-fluid related    -Stoolin-2 BMs per day per I/Os    MALNUTRITION  % Intake: < 75% for > 7 days (non-severe)  % Weight Loss: None noted  Subcutaneous Fat Loss: Unable to assess  Muscle Loss: Unable to assess  Fluid Accumulation/Edema:  Mild  Malnutrition Diagnosis: Unable to determine due to patient in OR    Previous Goals   Total avg nutritional intake to meet a minimum of 25 kcal/kg and 1.5 g PRO/kg daily (per dosing wt 105 kg).  Evaluation: Not met    Previous Nutrition Diagnosis  Inadequate protein-energy intake related to increased needs w/ hypercatabolism 2/2 necrotizing pancreatitis, NPO and no TF ordered this admission but pt has TF access, suspect previous TF regimen @ OSH inadequate as evidenced by no TF currently running x 1-2 days since admission, previous TF only providing 1.1 g/kg protein (with current est needs of 1.5 g/kg), 21% wt loss in 2-3 months.      Evaluation: Improving    CURRENT NUTRITION DIAGNOSIS  Inadequate protein-energy intake related to slow EN advancement and TF currently on hold for procedure as evidenced by average EN provided since admit meets only  64% of calorie needs and 67% of protein needs.      INTERVENTIONS  Implementation  -Unable to visit with patient as he was at procedure    Goals  Total avg nutritional intake to meet a minimum of 25 kcal/kg and 1.5g PRO/kg daily (per dosing wt 105 kg).    Monitoring/Evaluation  Progress toward goals will be monitored and evaluated per protocol.    Sandra Byrnes RD, LD  Unit pager: [phone]

## 2017-03-03 NOTE — PLAN OF CARE
Problem: Goal Outcome Summary  Goal: Goal Outcome Summary  PT 7C: Cancel - pt OOR for much of day, will reschedule per POC

## 2017-03-03 NOTE — PLAN OF CARE
Problem: Goal Outcome Summary  Goal: Goal Outcome Summary  Outcome: No Change  VSS. Pain well controlled with PRN Dilaudid and Percocet. NPO since midnight for procedure this AM, to go to OR at 0630. TF held. Up with SBA. Abdomen distended. Reports 2 BMs yesterday. Pre-op shower and checklist completed. Cont POC.

## 2017-03-03 NOTE — BRIEF OP NOTE
[facility], [place]    Brief Operative Note    Pre-operative diagnosis: Necrotizing Pancreatitis   Post-operative diagnosis * No post-op diagnosis entered *  Procedure: Procedure(s):  Endoscopic Ultrasound, Cyst Gastrostomy with Stent Placement x2 and Dilation - Wound Class: II-Clean Contaminated   - Wound Class: II-Clean Contaminated  Surgeon: Surgeon(s) and Role:     * Serafin Casanova MD - Primary     * Andria Puente MD - Resident - Assisting  Anesthesia: General   Estimated blood loss: Minimal  Drains: None  Specimens:   ID Type Source Tests Collected by Time Destination   1 : Pancreatic Necrosis Fluid Pancreas AMYLASE FLUID, ANAEROBIC BACTERIAL CULTURE, FLUID CULTURE AEROBIC BACTERIAL Serafin Casanova MD 3/3/2017  9:36 AM      Findings:   ERCP: Previously placed stent removed with a snare. Cholangio concerning for small debris in the CBD. Duct swept: small stones expressed. Extended the sphincterotomy.   EUS: Necrotic fluid collection seen. Punctured transgastric with 19 gauge needle and 10 mm x 15  axios deployed. Dilated to 12 cm. 10 f x 1 cm DPT solus stent placed.   NJ tube replaced.     Complications: None.  Implants: None.

## 2017-03-03 NOTE — ANESTHESIA CARE TRANSFER NOTE
Patient: Jayson Bella    Procedure(s):  Endoscopic Ultrasound, Cyst Gastrostomy with Stent Placement x2 and Dilation - Wound Class: II-Clean Contaminated   - Wound Class: II-Clean Contaminated    Diagnosis: Necrotizing Pancreatitis   Diagnosis Additional Information: No value filed.    Anesthesia Type:   No value filed.     Note:  Airway :Face Mask  Patient transferred to:PACU  Comments: Patient extubated with TV of 400, RR 18, MD at bedside, patient awake, follows commands, to PACU with Face mask 10L,, VSS, airway patent, RN at bedside.      Vitals: (Last set prior to Anesthesia Care Transfer)    CRNA VITALS  3/3/2017 0959 - 3/3/2017 1033      3/3/2017             Pulse: 97    Ht Rate: 96    SpO2: 93 %                Electronically Signed By: NANDINI Muñoz CRNA  March 3, 2017  10:33 AM

## 2017-03-03 NOTE — OR NURSING
Patient c/o0 of shortness of breathe, states respirations feel shallow.  Encouraged to deep breathe and couth. BBS clear upper lobes, decreased lower lobes.  Dr. Sanders here to evaluate, CXR ordered.

## 2017-03-03 NOTE — ANESTHESIA PREPROCEDURE EVALUATION
Anesthesia Plan      History & Physical Review      ASA Status:  3 .        Plan for General and ETT with Propofol induction. Maintenance will be Other.      See my note of yesterday      Postoperative Care      Consents                          .

## 2017-03-03 NOTE — OR NURSING
Hand-off report given to Teresa Bai RN on 6B, room will be clean in a while, in the meantime hand-off report given to Christi Rodríguez RN

## 2017-03-03 NOTE — ANESTHESIA POSTPROCEDURE EVALUATION
Patient: Jayson Bella    Procedure(s):  Endoscopic Ultrasound, Cyst Gastrostomy with Stent Placement x2 and Dilation - Wound Class: II-Clean Contaminated   - Wound Class: II-Clean Contaminated    Diagnosis:Necrotizing Pancreatitis   Diagnosis Additional Information: No value filed.    Anesthesia Type:  No value filed.    Note:  Anesthesia Post Evaluation    Patient location during evaluation: PACU  Patient participation: Able to fully participate in evaluation  Level of consciousness: awake and alert  Pain management: adequate  Airway patency: patent  Cardiovascular status: acceptable  Respiratory status: unstable  Hydration status: euvolemic  PONV: none       Comments: Pt with pre-op dyspnea, orthopnea.  Pancreatitis and effusions.  CXR ordered reveals bronchograms, likely c/w pleural effusions.  R 25-30 dyspnea, FM O2 with sats in low 90's.  Discussed with Dr Casanova, will transfer to higher care level and have primary service address.        Last vitals:  Vitals:    03/03/17 1100 03/03/17 1115 03/03/17 1130   BP: 129/88 (!) 145/91 145/86   Pulse:      Resp: (!) 32 (!) 32 (!) 32   Temp: 36.4  C (97.5  F)     SpO2: 95% 96% 95%         Electronically Signed By: Tony Sanders MD  March 3, 2017  11:47 AM

## 2017-03-03 NOTE — PLAN OF CARE
Problem: Goal Outcome Summary  Goal: Goal Outcome Summary  OT 7C: cancel, pt in OR/PACU all day, unavailable for therapy. Will reschedule.

## 2017-03-03 NOTE — PLAN OF CARE
Problem: Goal Outcome Summary  Goal: Goal Outcome Summary  Outcome: Therapy, progress towards functional goals is fair  AVSS. Pain managed with IV dilaudid. Paracentesis done today. Greenberg in place, adequate UOP. +gas +BM. Tube feeding running @85/hr, tolerating well. Orders to stop TF and make NPO at midnight for procedure tomorrow. Heparin held this afternoon. Up independently. Wife at bedside most of shift, attentive and supportive with cares. Pt resting comfortably. Continue POC.

## 2017-03-03 NOTE — PLAN OF CARE
Problem: Goal Outcome Summary  Goal: Goal Outcome Summary  Edema: Patient off the unit at OR - will reschedule.

## 2017-03-03 NOTE — PROGRESS NOTES
PANCREAS-BILIARY PROGRESS NOTE    ASSESSMENT:  46 year old male with past history depression, anxiety, tobacco abuse, past ETOH abuse, recent history significant for presumed gallstone pancreatitis (12/14/2016) s/p ERCP w sphincterotomy with biliary sludge (12/17/17), laparoscopic cholecystectomy (12/18/17), repeat ERCP with biliary stent (7 cm x 10 F ) for elevated LFT (1/12/17) transferred here from [facility] ([place], [place]) for further management of necrotizing pancreatitis with acute necrotic collections.   Also complicated by acute kidney injury, ascites and pleural effusions s/p chest tube (now removed).  On empiric meropenem since 2/14 (signout from [place] suggested infected ascites though we do not have those records). Off merro on 3/1 developed drenching sweats, rising WBC thus restarted and CT w/IV contrast done. There is a maturing collection inferior to the stomach, partially loculated ascites, recurrent pleural effusion and immature LUQ necrotic collections and inflammation.   Ascites tap done 3/2 with ~500 neutrophils and excessively high amylase/lipase c/w pancreatic ascites. Culture pending.   EUS cystgastrostomy 3/3, Axios and solus placement in lesser sac collection. ERCP with sphincterotomy and stent removal also done.         RECOMMENDATIONS:    Await cultures from cyst and ascites, continue abx for now    IF he worsens despite the above, consider IR perc drain into LUQ collection and/or thoracentesis to assess for a source    Continue tube feeds through NJ tube. Ok with clear liquid diet. Agree with pancreatic enzyme supplementation with tube feeds.    Continue meropenem    Hemodynamic resuscitation per primary team    Hold DVT prophylaxis aside from SCDs for now      The patient care plan was discussed with Dr. Lamb, Pancreaticobiliary staff physician.    Piter Wheeler MD  GI Fellow  _______________________________________________________________  S: chart reviewed, patient in OR for EUS      O:  Temp:  [95.8  F (35.4  C)-97.7  F (36.5  C)] 97.5  F (36.4  C)  Pulse:  [103] 103  Heart Rate:  [89-99] 92  Resp:  [18-32] 32  BP: (117-151)/(72-94) 145/86  SpO2:  [91 %-98 %] 95 %  GEN: lying in bed, diaphoretic, slight increased work of breathing  HEENT: no scleral icterus, NJ in place  Resp: increased work of breathing  Abd: protuberant, slightly firm, minimally tender in the mid abdomen without rebound or guarding. Hypoactive but present bowel sounds  Ext: legs wrapped in compression bandaging  Neuro: aao x 3    LABS:  BMP    Recent Labs  Lab 03/02/17 0821 03/01/17  0947 02/28/17  0637 02/27/17  0626    137 138 143   POTASSIUM 3.6 3.8 4.2 4.0   CHLORIDE 105 102 103 108   YAN 7.3* 7.5* 7.2* 7.5*   CO2 28 30 29 28   BUN 36* 40* 36* 33*   CR 1.13 1.28* 1.44* 1.68*   * 122* 120* 92     CBC    Recent Labs  Lab 03/03/17 0157 03/02/17 0821 03/01/17  0947 02/28/17  0637   WBC 13.4* 16.5* 21.2* 16.3*   RBC 3.25* 3.44* 3.66* 3.92*   HGB 8.4* 8.9* 9.4* 10.3*   HCT 26.8* 28.5* 30.3* 32.5*   MCV 83 83 83 83   MCH 25.8* 25.9* 25.7* 26.3*   MCHC 31.3* 31.2* 31.0* 31.7   RDW 16.7* 16.8* 16.7* 16.6*   * 467* 477* 538*     INR    Recent Labs  Lab 03/03/17 0157   INR 1.14     LFTs    Recent Labs  Lab 03/02/17  0821 03/01/17  0947 02/28/17  0637 02/25/17  2206   ALKPHOS  --  87  --  90   AST  --  20 31 30   ALT  --  15 16 25   BILITOTAL  --  0.3  --  0.4   PROTTOTAL  --  6.1* 5.8* 5.6*   ALBUMIN 1.2* 1.3* 1.2* 1.4*      PANC    Recent Labs  Lab 02/25/17  2206   LIPASE 1070*   AMYLASE 577*

## 2017-03-03 NOTE — OR NURSING
Dr. Tucker here to see patient and wife here in PACU.  Additional lasix IV ordered.  Plan is to transfer to a step-down unit post procedure.

## 2017-03-04 ENCOUNTER — APPOINTMENT (OUTPATIENT)
Dept: OCCUPATIONAL THERAPY | Facility: CLINIC | Age: 47
DRG: 871 | End: 2017-03-04
Attending: INTERNAL MEDICINE

## 2017-03-04 LAB
ANION GAP SERPL CALCULATED.3IONS-SCNC: 6 MMOL/L (ref 3–14)
ANION GAP SERPL CALCULATED.3IONS-SCNC: 7 MMOL/L (ref 3–14)
BACTERIA SPEC CULT: ABNORMAL
BACTERIA SPEC CULT: NO GROWTH
BACTERIA SPEC CULT: NO GROWTH
BUN SERPL-MCNC: 29 MG/DL (ref 7–30)
BUN SERPL-MCNC: 31 MG/DL (ref 7–30)
CALCIUM SERPL-MCNC: 7.8 MG/DL (ref 8.5–10.1)
CALCIUM SERPL-MCNC: 7.8 MG/DL (ref 8.5–10.1)
CHLORIDE SERPL-SCNC: 100 MMOL/L (ref 94–109)
CHLORIDE SERPL-SCNC: 100 MMOL/L (ref 94–109)
CO2 SERPL-SCNC: 30 MMOL/L (ref 20–32)
CO2 SERPL-SCNC: 32 MMOL/L (ref 20–32)
CREAT SERPL-MCNC: 1 MG/DL (ref 0.66–1.25)
CREAT SERPL-MCNC: 1.03 MG/DL (ref 0.66–1.25)
CRP SERPL-MCNC: 70 MG/L (ref 0–8)
GFR SERPL CREATININE-BSD FRML MDRD: 78 ML/MIN/1.7M2
GFR SERPL CREATININE-BSD FRML MDRD: 81 ML/MIN/1.7M2
GLUCOSE BLDC GLUCOMTR-MCNC: 118 MG/DL (ref 70–99)
GLUCOSE SERPL-MCNC: 107 MG/DL (ref 70–99)
GLUCOSE SERPL-MCNC: 94 MG/DL (ref 70–99)
LACTATE BLD-SCNC: 0.6 MMOL/L (ref 0.7–2.1)
MAGNESIUM SERPL-MCNC: 1.6 MG/DL (ref 1.6–2.3)
MAGNESIUM SERPL-MCNC: 1.7 MG/DL (ref 1.6–2.3)
MICRO REPORT STATUS: ABNORMAL
MICRO REPORT STATUS: NORMAL
MICRO REPORT STATUS: NORMAL
MICROORGANISM SPEC CULT: ABNORMAL
PHOSPHATE SERPL-MCNC: 3.5 MG/DL (ref 2.5–4.5)
POTASSIUM SERPL-SCNC: 3.5 MMOL/L (ref 3.4–5.3)
POTASSIUM SERPL-SCNC: 3.6 MMOL/L (ref 3.4–5.3)
POTASSIUM SERPL-SCNC: 3.6 MMOL/L (ref 3.4–5.3)
PROCALCITONIN SERPL-MCNC: 0.09 NG/ML
SODIUM SERPL-SCNC: 136 MMOL/L (ref 133–144)
SODIUM SERPL-SCNC: 138 MMOL/L (ref 133–144)
SPECIMEN SOURCE: ABNORMAL
SPECIMEN SOURCE: NORMAL
SPECIMEN SOURCE: NORMAL

## 2017-03-04 PROCEDURE — 36415 COLL VENOUS BLD VENIPUNCTURE: CPT | Performed by: INTERNAL MEDICINE

## 2017-03-04 PROCEDURE — 86140 C-REACTIVE PROTEIN: CPT | Performed by: STUDENT IN AN ORGANIZED HEALTH CARE EDUCATION/TRAINING PROGRAM

## 2017-03-04 PROCEDURE — 40000133 ZZH STATISTIC OT WARD VISIT

## 2017-03-04 PROCEDURE — 40000556 ZZH STATISTIC PERIPHERAL IV START W US GUIDANCE

## 2017-03-04 PROCEDURE — 97530 THERAPEUTIC ACTIVITIES: CPT | Mod: GO

## 2017-03-04 PROCEDURE — 84100 ASSAY OF PHOSPHORUS: CPT | Performed by: STUDENT IN AN ORGANIZED HEALTH CARE EDUCATION/TRAINING PROGRAM

## 2017-03-04 PROCEDURE — 25000128 H RX IP 250 OP 636: Performed by: STUDENT IN AN ORGANIZED HEALTH CARE EDUCATION/TRAINING PROGRAM

## 2017-03-04 PROCEDURE — 83735 ASSAY OF MAGNESIUM: CPT | Performed by: INTERNAL MEDICINE

## 2017-03-04 PROCEDURE — 36415 COLL VENOUS BLD VENIPUNCTURE: CPT | Performed by: STUDENT IN AN ORGANIZED HEALTH CARE EDUCATION/TRAINING PROGRAM

## 2017-03-04 PROCEDURE — 00000146 ZZHCL STATISTIC GLUCOSE BY METER IP

## 2017-03-04 PROCEDURE — 97535 SELF CARE MNGMENT TRAINING: CPT | Mod: GO

## 2017-03-04 PROCEDURE — 27210995 ZZH RX 272

## 2017-03-04 PROCEDURE — 84145 PROCALCITONIN (PCT): CPT | Performed by: STUDENT IN AN ORGANIZED HEALTH CARE EDUCATION/TRAINING PROGRAM

## 2017-03-04 PROCEDURE — 99233 SBSQ HOSP IP/OBS HIGH 50: CPT | Mod: GC | Performed by: INTERNAL MEDICINE

## 2017-03-04 PROCEDURE — 25000132 ZZH RX MED GY IP 250 OP 250 PS 637: Performed by: INTERNAL MEDICINE

## 2017-03-04 PROCEDURE — 83605 ASSAY OF LACTIC ACID: CPT | Performed by: INTERNAL MEDICINE

## 2017-03-04 PROCEDURE — 12000001 ZZH R&B MED SURG/OB UMMC

## 2017-03-04 PROCEDURE — 40000133 ZZH STATISTIC OT WARD VISIT: Performed by: OCCUPATIONAL THERAPIST

## 2017-03-04 PROCEDURE — 80048 BASIC METABOLIC PNL TOTAL CA: CPT | Performed by: STUDENT IN AN ORGANIZED HEALTH CARE EDUCATION/TRAINING PROGRAM

## 2017-03-04 PROCEDURE — 84132 ASSAY OF SERUM POTASSIUM: CPT | Performed by: INTERNAL MEDICINE

## 2017-03-04 PROCEDURE — 25000132 ZZH RX MED GY IP 250 OP 250 PS 637: Performed by: STUDENT IN AN ORGANIZED HEALTH CARE EDUCATION/TRAINING PROGRAM

## 2017-03-04 PROCEDURE — 83735 ASSAY OF MAGNESIUM: CPT | Performed by: STUDENT IN AN ORGANIZED HEALTH CARE EDUCATION/TRAINING PROGRAM

## 2017-03-04 PROCEDURE — 25000128 H RX IP 250 OP 636: Performed by: INTERNAL MEDICINE

## 2017-03-04 PROCEDURE — 97140 MANUAL THERAPY 1/> REGIONS: CPT | Mod: GO | Performed by: OCCUPATIONAL THERAPIST

## 2017-03-04 PROCEDURE — 25000125 ZZHC RX 250: Performed by: STUDENT IN AN ORGANIZED HEALTH CARE EDUCATION/TRAINING PROGRAM

## 2017-03-04 PROCEDURE — 27210436 ZZH NUTRITION PRODUCT SEMIELEM INTERMED CAN

## 2017-03-04 RX ORDER — MEROPENEM 1 G/1
1 INJECTION, POWDER, FOR SOLUTION INTRAVENOUS EVERY 8 HOURS
Status: DISCONTINUED | OUTPATIENT
Start: 2017-03-04 | End: 2017-03-16

## 2017-03-04 RX ORDER — LINEZOLID 2 MG/ML
600 INJECTION, SOLUTION INTRAVENOUS EVERY 12 HOURS
Status: DISCONTINUED | OUTPATIENT
Start: 2017-03-04 | End: 2017-03-08

## 2017-03-04 RX ORDER — LINEZOLID 2 MG/ML
600 INJECTION, SOLUTION INTRAVENOUS EVERY 12 HOURS
Status: DISCONTINUED | OUTPATIENT
Start: 2017-03-04 | End: 2017-03-04

## 2017-03-04 RX ADMIN — OXYCODONE HYDROCHLORIDE AND ACETAMINOPHEN 1 TABLET: 5; 325 TABLET ORAL at 05:32

## 2017-03-04 RX ADMIN — PANCRELIPASE 24000 UNITS: 24000; 76000; 120000 CAPSULE, DELAYED RELEASE PELLETS ORAL at 05:31

## 2017-03-04 RX ADMIN — LINEZOLID 600 MG: 600 INJECTION, SOLUTION INTRAVENOUS at 18:43

## 2017-03-04 RX ADMIN — MICAFUNGIN SODIUM 100 MG: 10 INJECTION, POWDER, LYOPHILIZED, FOR SOLUTION INTRAVENOUS at 17:15

## 2017-03-04 RX ADMIN — PANTOPRAZOLE SODIUM 40 MG: 40 TABLET, DELAYED RELEASE ORAL at 07:48

## 2017-03-04 RX ADMIN — MULTIVITAMIN 15 ML: LIQUID ORAL at 07:48

## 2017-03-04 RX ADMIN — SODIUM BICARBONATE 325 MG: 325 TABLET ORAL at 22:10

## 2017-03-04 RX ADMIN — FUROSEMIDE 20 MG: 10 INJECTION, SOLUTION INTRAVENOUS at 07:48

## 2017-03-04 RX ADMIN — OXYCODONE HYDROCHLORIDE AND ACETAMINOPHEN 1 TABLET: 5; 325 TABLET ORAL at 17:42

## 2017-03-04 RX ADMIN — OXYCODONE HYDROCHLORIDE AND ACETAMINOPHEN 1 TABLET: 5; 325 TABLET ORAL at 22:11

## 2017-03-04 RX ADMIN — LINEZOLID 600 MG: 600 INJECTION, SOLUTION INTRAVENOUS at 03:06

## 2017-03-04 RX ADMIN — HYDROMORPHONE HYDROCHLORIDE 0.5 MG: 10 INJECTION, SOLUTION INTRAMUSCULAR; INTRAVENOUS; SUBCUTANEOUS at 08:19

## 2017-03-04 RX ADMIN — FUROSEMIDE 20 MG: 10 INJECTION, SOLUTION INTRAVENOUS at 17:17

## 2017-03-04 RX ADMIN — Medication 2 G: at 07:48

## 2017-03-04 RX ADMIN — MEROPENEM 1 G: 1 INJECTION, POWDER, FOR SOLUTION INTRAVENOUS at 21:57

## 2017-03-04 RX ADMIN — OXYCODONE HYDROCHLORIDE AND ACETAMINOPHEN 1 TABLET: 5; 325 TABLET ORAL at 11:59

## 2017-03-04 RX ADMIN — Medication 30 ML: at 20:50

## 2017-03-04 RX ADMIN — Medication 2 SPRAY: at 17:18

## 2017-03-04 RX ADMIN — SODIUM BICARBONATE 325 MG: 325 TABLET ORAL at 11:03

## 2017-03-04 RX ADMIN — PANCRELIPASE 24000 UNITS: 24000; 76000; 120000 CAPSULE, DELAYED RELEASE PELLETS ORAL at 11:03

## 2017-03-04 RX ADMIN — PANCRELIPASE 72000 UNITS: 24000; 76000; 120000 CAPSULE, DELAYED RELEASE PELLETS ORAL at 22:10

## 2017-03-04 RX ADMIN — POTASSIUM CHLORIDE 10 MEQ: 7.46 INJECTION, SOLUTION INTRAVENOUS at 07:48

## 2017-03-04 RX ADMIN — MELATONIN TAB 3 MG 3 MG: 3 TAB at 22:11

## 2017-03-04 RX ADMIN — Medication 2 SPRAY: at 20:50

## 2017-03-04 RX ADMIN — SODIUM BICARBONATE 325 MG: 325 TABLET ORAL at 17:40

## 2017-03-04 RX ADMIN — Medication 2 SPRAY: at 09:12

## 2017-03-04 RX ADMIN — Medication 30 ML: at 17:18

## 2017-03-04 RX ADMIN — PANCRELIPASE 48000 UNITS: 24000; 76000; 120000 CAPSULE, DELAYED RELEASE PELLETS ORAL at 14:00

## 2017-03-04 RX ADMIN — Medication 2 SPRAY: at 12:01

## 2017-03-04 RX ADMIN — SODIUM BICARBONATE 325 MG: 325 TABLET ORAL at 14:48

## 2017-03-04 RX ADMIN — SODIUM BICARBONATE 325 MG: 325 TABLET ORAL at 01:48

## 2017-03-04 RX ADMIN — POTASSIUM CHLORIDE 10 MEQ: 14.9 INJECTION, SOLUTION, CONCENTRATE PARENTERAL at 23:07

## 2017-03-04 RX ADMIN — SODIUM BICARBONATE 325 MG: 325 TABLET ORAL at 05:31

## 2017-03-04 RX ADMIN — PANCRELIPASE 48000 UNITS: 24000; 76000; 120000 CAPSULE, DELAYED RELEASE PELLETS ORAL at 17:40

## 2017-03-04 RX ADMIN — OXYCODONE HYDROCHLORIDE AND ACETAMINOPHEN 1 TABLET: 5; 325 TABLET ORAL at 02:09

## 2017-03-04 RX ADMIN — PANCRELIPASE 24000 UNITS: 24000; 76000; 120000 CAPSULE, DELAYED RELEASE PELLETS ORAL at 01:48

## 2017-03-04 RX ADMIN — Medication 30 ML: at 07:49

## 2017-03-04 RX ADMIN — SENNOSIDES AND DOCUSATE SODIUM 2 TABLET: 8.6; 5 TABLET ORAL at 07:48

## 2017-03-04 RX ADMIN — Medication 2 G: at 14:00

## 2017-03-04 ASSESSMENT — PAIN DESCRIPTION - DESCRIPTORS: DESCRIPTORS: ACHING;SORE

## 2017-03-04 NOTE — PLAN OF CARE
Problem: Goal Outcome Summary  Goal: Goal Outcome Summary  OT 6B: Patient tends to rely on family for assist with self cares and needs much encouragement to participate in oob activity and increase independence with ADL's. Min assist and HOB raised for bed mobility; CGA for sit<> stand and walk in room. O2 sats drop to 88% on room air at rest; 93% with 2l O2.  Pending continued progress and participation with OT and PT, patient will be able to to discharge to home with assist from wife.

## 2017-03-04 NOTE — PLAN OF CARE
Problem: Goal Outcome Summary  Goal: Goal Outcome Summary  Edema 6B: Wraps removed x24 hours, measurements taken with no reductions seen following prolonged time without comrpession. Skin intact with 2+ pitting distally. Reapplication of GCB from MTPs to knee creases for continued edema management and increased ease with functional mobility. Remove if causing pain/numbness.

## 2017-03-04 NOTE — PROVIDER NOTIFICATION
Pt switched from 33-2 to 38-2 as paracentesis culture resulted positive for VRE. New orders for abx will be placed per on-call MD. Will continue to monitor.

## 2017-03-04 NOTE — PLAN OF CARE
"Problem: Goal Outcome Summary  Goal: Goal Outcome Summary  Outcome: No Change  /88 (BP Location: Left arm)  Pulse 103  Temp 97.7  F (36.5  C) (Axillary)  Resp 20  Ht 1.905 m (6' 3\")  Wt 131 kg (288 lb 11.2 oz)  SpO2 97%  BMI 36.09 kg/m2  Neuro: A&Ox4.   Cardiac: SR. VSS.       Respiratory: Sating upper 90's on 2L nasal cannula  GI/: Adequate urine output via stafford catheter  Diet/appetite: Tolerating clear liquid diet.  Minimal intake.  TF at 25 mL/hour restarted at 2200  Activity:  Assist of 1.  Pivot transfer.   Pain: Reports abdominal pain.  Prn dilaudid and percocet given x 1 with stated relief.    Skin: Intact.  Chest tube dressing c/d/i.       R: Continue with POC. Notify primary team with changes.      "

## 2017-03-04 NOTE — CONSULTS
[facility] ID SERVICE: NEW CONSULTATION   Jayson Bella : 1970 Sex: male:   Medical record number [id number]  Date of Admission: 2017  Consult Requester:Emilia Rose MD  Date of Service: 2017    REASON FOR CONSULT: Peritonitis    PROBLEM LIST:   1. Necrotizing pancreatitis with pseudocyst formation  2. Peritonitis with growth of VRE and Yeast; multiple abscesses identified by CT 3/1    RECOMMENDATIONS:   1. Agree with Linezolid for VRE in the peritoneal fluid  2. Agree with Micafungin for Yeast in the peritoneal fluid  3. Recommend continuing the meropenem as peritonitis is likely polymicrobial and he responded well to therapy  4. Monitor for clinical response; may benefit from re-imaging next week    DISCUSSION:   46 year old male with PMH of gallstone pancreatitis s/p cholecystectomy on 2016, complicated by pseudocyst compressing bile duct s/p ERCP on 2017 who presents from OSH with sepsis 2/2 necrotizing pancreatitis on 2017, now with VRE/Candidal peritonitis and evidence of intra-abdominal abscesses. Despite the lack of directed therapy to VRE/Candida, he responded to empiric meropenem with decreased fever, WBC, CRP, and procalcitonin. The peritonitis was likely polymicrobial to begin with and he responded to meropenem. The presence of VRE/Candida represent the only organism that could survive the meropenem. Their importance is questionable since he improved prior to changing antimicrobial therapy. Given the presence of the pseudocyst and multiple abscesses, favor targeted treatment for these two organisms, but would also resume the meropenem. Duration of therapy will be driven by clinical parameters (already improving) and radiographic findings.  ID will continue to follow    CHARLA Acevedo M.D.  [facility] ID Service Staff  [phone]     HPI:   46 year old male with PMH of gallstone pancreatitis s/p cholecystectomy on 2016, complicated by pseudocyst compressing bile  "duct s/p ERCP on 1/11/2017 who presents from OSH with sepsis 2/2 necrotizing pancreatitis on 2/25/2017.     History gathered from chart review. Patient originally presented to OSH with acute pancreatitis on 12/13/16 and was conservatively managed and discharged. He then presented back to hospital with jaundice in 1/17 due to pseudocyst compressing bile duct s/p ERCP. Per wife, since January, patient has had continued pain and anorexia. Patient was most recently hospitalized on 2/14/16 after presenting to outside GI clinic with generalized weakness and abdominal pain. Patient was started on meropenem while inpatient, but clinically deteriorated on 2/23 with increasing WBC and fever. At this time, a thoracentesis and paracentesis were performed. Records of both are incomplete. Per Dr. Mayes's note, paracentesis showed \"evidence of peritonitis.\" Patient was transferred to [facility] for ERCP vs IR drainage of intraabdominal fluid on 2/25/17. Per wife patient had lost 71 lb since 12/16 but since 2/14 admission had gained 40 lb in fluid.     Summary of hospital course:  -180, Lipase 1070, Procal 0.43-0.48, peak WBC 29.2 on 2/25 at OSH, paracentesis at OSH with counts not suggestive of peritonitis, cultures negative but no fungal cultures sent. Pleural fluid cultures from outside hospital also with NGTD but no fungal cultures sent.  - Placed on meropenem (2/26-3/3)  - 3/1/17 CT abd w/IV contrast confirms multiple rim enhancing fluid pockets & GGOs in lung  - blood Cx collected while pt on chio & vanc (after transfer), again on evening of 2/28 after afternoon meropenem dose was held, NGTD  - diagnostic pericentesis with IR 3/2 shows green fluid, WBCs, high lipase, growing VRE and Yeast; linezolid and micafungin added today  -  s/p EUS/cystogastrostomy on 3/3    On ROS, patient endorsed abdominal pain, watery stool BID, SOB, fever, anorexia, and recent urinary retention on admission. Patient denied CP, melena, " "hematochezia, dysuria, cough, HA.  Today he feels worn out. Some nausea, but no vomiting. No SOB, CP, bladder issues. No rash. Peritoneal tap site is good.  ANTI-INFECTIVES:   Current: Linezolid (3/4-), Micafungin (3/4-), Meropenem (2/26-)    ROS: (Recommend ? 10 systems)   A ten point review of systems was obtained and was negative with the exception of that which is described in the HPI.  PMH:   History reviewed. No pertinent past medical history.  Past Surgical History   Procedure Laterality Date     Cholecystectomy       Gi surgery       SOCIAL HISTORY AND RISK FACTORS   Social History   Substance Use Topics     Smoking status: Unknown If Ever Smoked     Smokeless tobacco: Not on file     Alcohol use Not on file     History   Sexual Activity     Sexual activity: Not on file     FAMILY HISTORY:   Reviewed and non-contributory  History reviewed. No pertinent family history.    EXAMINATION: (Recommend ? 8 systems)   /85 (BP Location: Right arm)  Pulse 103  Temp 97.5  F (36.4  C) (Oral)  Resp 20  Ht 1.905 m (6' 3\")  Wt 135.8 kg (299 lb 6.4 oz)  SpO2 94%  BMI 37.42 kg/m2  GENERAL:  well-developed, well-nourished, in bed in no acute distress.   HEENT:  Head is normocephalic, atraumatic   EYES:  Eyes have anicteric sclerae without conjunctival injection   ENT:  Oropharynx is moist without exudates or ulcers.   NECK:  Supple. No  Cervical lymphadenopathy  LUNGS:  Clear to auscultation bilateral.   CARDIOVASCULAR:  Regular rate and rhythm with no murmurs, gallops or rubs.  ABDOMEN:  Rare bowel sounds, soft, diffuse tenderness with palpation. No appreciable hepatosplenomegaly  SKIN:  No acute rashes.  Line(s) are in place without any surrounding erythema or exudate. Bilateral edema; wrapped   NEUROLOGIC:  Grossly nonfocal. Active x4 extremities  RELEVANT DATA:   BASIC LABS:   Inflammatory Markers    Recent Labs   Lab Test  03/04/17   0534  03/01/17   0947  02/27/17   0626  02/25/17   2206   CRP  70.0*  180.0*  " 150.0*  180.0*       Hematology Studies    Recent Labs   Lab Test  03/03/17   0157  03/02/17   0821  03/01/17   0947  02/28/17   0637  02/27/17   0626  02/26/17   0601   WBC  13.4*  16.5*  21.2*  16.3*  16.2*  21.0*   HGB  8.4*  8.9*  9.4*  10.3*  9.5*  8.7*   MCV  83  83  83  83  84  84   PLT  475*  467*  477*  538*  509*  489*       Immune Globulin Studies  No lab results found.    Metabolic Studies     Recent Labs   Lab Test  03/04/17   1251  03/04/17   0534  03/03/17   1843  03/02/17   0821  03/01/17   0947  02/28/17   0637   NA   --   136  141  143  137  138   POTASSIUM  3.5  3.6  4.0  3.6  3.8  4.2   CHLORIDE   --   100  103  105  102  103   CO2   --   30  27  28  30  29   BUN   --   29  30  36*  40*  36*   CR   --   1.00  1.04  1.13  1.28*  1.44*   GFRESTIMATED   --   81  77  70  61  53*       Hepatic Studies    Recent Labs   Lab Test  03/02/17   0821  03/01/17   0947  02/28/17   0637  02/25/17   2206   BILITOTAL   --   0.3   --   0.4   ALKPHOS   --   87   --   90   ALBUMIN  1.2*  1.3*  1.2*  1.4*   AST   --   20  31  30   ALT   --   15  16  25       Thyroid Studies  No lab results found.    Invalid input(s): FT2    MICROBIOLOGY LABS:   Culture Micro   Date Value Ref Range Status   03/03/2017 Culture negative monitoring continues  Final   03/03/2017 Light growth Yeast  Culture in progress   (A)  Final   03/02/2017 (A)  Final    Heavy growth Enterococcus faecium (VRE)  Critical Value/Significant Value called to and read back by FRANSISCA BARRERA RN   (6B).  03.04.17 0011 S     03/02/2017 Culture negative monitoring continues  Final   03/02/2017 Culture negative after 16 hours  Final   02/26/2017 No growth  Final   02/26/2017 Canceled, Test credited Duplicate request  Final   02/26/2017 Canceled, Test credited Duplicate request  Final   02/25/2017 No growth  Final   02/25/2017 No growth  Final       Urine Studies    Recent Labs   Lab Test  02/26/17   0115   LEUKEST  Negative   WBCU  9*

## 2017-03-04 NOTE — OR NURSING
Ready for transfer to , pt now on O2 4 L/NC. Unable to wean O2, sats decreased to 89-90% on brief trial of Room Air. Loose cough. AVSS.

## 2017-03-04 NOTE — PLAN OF CARE
"Problem: Goal Outcome Summary  Goal: Goal Outcome Summary  Outcome: Improving  Pt alert and oriented. Vitals signs stable. Pt on 2L NC for \"feeling fuzzy\" after taking dilaudid. Electrolyte replacement initiated for potassium and magnesium, recheck will be ordered. Pt given dilaudid for breakthrough pain, as he is using oxycodone as well. Due to response to dilaudid and pt denying pain, oxycodone was not given. Bowel sounds active, pt passing gas now, but no BM as of yet. Pt denies feeling constipated or having a feeling of fullness in his abdomen. Tube feeding at 45/hr now, will be increased to 65 at noon. Staff updated pt's wife with new order for abx and and new change with contact precautions. Education on safety precaution provided to patient and spouse. Education also provided to patient concerning ambulation and minimizing use of dilaudid; with goal to be switched to just using oxycodone for now. Will continue to monitor.       "

## 2017-03-04 NOTE — PROGRESS NOTES
[facility]   Internal Medicine Progress Note     Patient Name: Jayson Bella   : 1970, Age: 46 year old  Admission Date: 2017   Primary Team: Maroon 3     Assessment & Plan     47 yo M with hx of gallstone pancreatitis s/p cholecystectomy in 2016 c/b biliary obstruction due to large pseudocyst s/p ERCP with stent placement 2017 who was transferred from OSH for sepsis, fever, severe leukocytosis concerning for necrotizing pancreatitis and abscess formation.    Changes today:  - meropenem was d/c'd by night doodle resident for unknown reasons and not passed on to team on sig out. Meropenem was restarted today  - paracentesis fluid+ for VRE, started on linezolid today  - EUS fluid positive for yeast, started on micafungin today  - continue diuresis & repletions  - advance diet to full liquid    # necrotizing pancreatitis, previously septic  # summer-pancreatic acute fluid collections/walled off necrosis  -180, Lipase 1070, Procal 0.43-0.48, peak WBC 29.2 on  at OSH, paracentesis at OSH with counts not suggestive of peritonitis, cultures negative but no fungal cultures sent. Pleural fluid cultures from outside hospital also with NGTD but no fungal cultures sent. 3/1/17 CT abd w/IV contrast confirms multiple rim enhancing fluid pockets & GGOs in lung. Blood Cx's collected while pt was on chio & vanc (after transfer), again when febrile, on evening of  after afternoon meropenem dose was held, all NG final.  PLAN  - diagnostic pericentesis with IR 3/ w/VRE+, start linezolid (3/4 -> )  - EUS/cystogastrostomy fluid from 3/3 w/yeast+, start micafungin (3/4 ->)  - continue meropenem (since prior to  at OSH -> )  - monitor for fever, hemodynamic instability  - daily CBCs and QOD CRP, procal    # hypervolemia, ascites likely secondary to IVF in setting of sepsis and hypoalbuminemia  - recent weight of 106 kg on   - strict I&O, stafford due to urinary retention.  Start tamsolusin before d/c'ing stafford  - daily weights  - start diuresing again today with 20 mg IV lasix BID, check electrolytes BID and replete prn  - K/M repletion protocols with goal 4/2    # Severe malnutrition  - Continue TF (hold tonight for EUS/cystgastrostomy 3/3)  - pancreatic enzymes    # L sided pleural effusion - chest tube removed 2/26  - per chart review, pleural fluid had 1152 nucleated cells, 42 PMNs, LDH of 183, Protein 2.9, and no growth at 3 days. However, LDH and protein were not checked in the serum in or around the date of thoracentesis. If using total protein on admission, protein ratio is 0.52 which fulfills criteria for exudative. No growth from pleural effusion cultures from OSH.  - chest tube removed 2/26, f/u xr without evidence of pneumo    # pain, insomnia  - pt has had significant AMS with trazodone and fentanyl.  - dilaudid IV PRN available  - percocet q4h prn available, preferentially use this  - seroquel 25 mg QHS prn    # resolved problems  1. JUAN, likely 2/2 intravascular volume depletion in setting of sepsis, with contribution from low albumin. Had JUAN from OSH. Abd US w/ascites and normal kidneys. Resolved 3/4/17    FEN: NJTFs per nutrition, full liquid  Prophylaxis:  DVT: SCDs, heparin subq    Lung: IS, OOB  GI: pantoprazole QDay  Code Status: FULL CODE  Disposition: pending further evaluation of pancreas and intervention as necessary    Patient seen and discussed with Dr. Rose Domingo MD, PhD  PGY-1, Internal Medicine  [id number]      Interval History     In procedure this AM  In PM, seen at beside in PACU. Some dyspnea with hypoxia on 8L O2 post procedure.    4 point ROS is otherwise negative including Resp, CVS, GI, .      Vitals, Exam, Data     Physical exam:  Temp:  [97.4  F (36.3  C)-98.6  F (37  C)] 98.4  F (36.9  C)  Heart Rate:  [80-92] 81  Resp:  [16-32] 16  BP: (117-145)/(68-91) 130/79  SpO2:  [92 %-99 %] 96 % on     Wt Readings from Last 2 Encounters:    03/02/17 131 kg (288 lb 11.2 oz)       Intake/Output Summary (Last 24 hours) at 03/01/17 1818  Last data filed at 03/01/17 1700   Gross per 24 hour   Intake          2350.42 ml   Output             2825 ml   Net          -474.58 ml     Physical Exam:   General: Pleasant, resting in bed, some pain  HEENT: mmm, NJ in place  Cardiac: Normal rate, regular rhythm. No m/r/g. Normal S1, S2.  Pulm: decreased at bases, otherwise clear throughout  Abd: Soft, diffusely tender, moderately distended.  Extremities: 1-2+ Bilateral LE edema, wraps in palce  Neuro: A&Ox3, no focal deficits    CBC    Recent Labs  Lab 03/03/17  0157 03/02/17  0821 03/01/17  0947 02/28/17  0637   WBC 13.4* 16.5* 21.2* 16.3*   HGB 8.4* 8.9* 9.4* 10.3*   MCV 83 83 83 83   * 467* 477* 538*       BMP    Recent Labs  Lab 03/04/17  0534 03/03/17  1843 03/02/17  0821 03/01/17  0947 02/28/17  0637 02/27/17  0626 02/26/17  0601    141 143 137 138 143 141   POTASSIUM 3.6 4.0 3.6 3.8 4.2 4.0 3.9   CHLORIDE 100 103 105 102 103 108 107   CO2 30 27 28 30 29 28 29   ANIONGAP 7 11 11 6 6 7 5   GLC 94 97 106* 122* 120* 92 87   BUN 29 30 36* 40* 36* 33* 33*   CR 1.00 1.04 1.13 1.28* 1.44* 1.68* 1.83*   YAN 7.8* 7.6* 7.3* 7.5* 7.2* 7.5* 7.2*   MAG 1.6  --  1.7 1.8 1.8 2.0 1.9   PHOS 3.5  --   --   --  3.0 3.9 3.9        INR    Recent Labs  Lab 03/03/17  0157   INR 1.14       Liver panel    Recent Labs  Lab 03/02/17  0821 03/01/17  0947 02/28/17  0637 02/25/17  2206   PROTTOTAL  --  6.1* 5.8* 5.6*   ALBUMIN 1.2* 1.3* 1.2* 1.4*   BILITOTAL  --  0.3  --  0.4   ALKPHOS  --  87  --  90   AST  --  20 31 30   ALT  --  15 16 25     Imaging, CT abd w/contrast 3/1/17:  1. Necrotizing pancreatitis with acute pancreatic collections/walled  off necrosis in the body and the tail of the pancreas. There are  additional rim-enhancing walled off necrosis within the abdomen, at  least one new from prior, , none of these collections contain gas.  2. Moderate left pleural  effusion with interval removal of the left  basilar chest tube.  3. Groundglass opacities in the periphery of the right middle lobe  and lingula, concerning for infection versus aspiration.  4. Bibasilar atelectasis/consolidation.

## 2017-03-05 ENCOUNTER — APPOINTMENT (OUTPATIENT)
Dept: PHYSICAL THERAPY | Facility: CLINIC | Age: 47
DRG: 871 | End: 2017-03-05
Attending: INTERNAL MEDICINE

## 2017-03-05 ENCOUNTER — APPOINTMENT (OUTPATIENT)
Dept: OCCUPATIONAL THERAPY | Facility: CLINIC | Age: 47
DRG: 871 | End: 2017-03-05
Attending: INTERNAL MEDICINE

## 2017-03-05 LAB
ANION GAP SERPL CALCULATED.3IONS-SCNC: 4 MMOL/L (ref 3–14)
ANION GAP SERPL CALCULATED.3IONS-SCNC: 7 MMOL/L (ref 3–14)
BASOPHILS # BLD AUTO: 0.1 10E9/L (ref 0–0.2)
BASOPHILS NFR BLD AUTO: 0.5 %
BUN SERPL-MCNC: 31 MG/DL (ref 7–30)
BUN SERPL-MCNC: 32 MG/DL (ref 7–30)
CALCIUM SERPL-MCNC: 7.6 MG/DL (ref 8.5–10.1)
CALCIUM SERPL-MCNC: 7.7 MG/DL (ref 8.5–10.1)
CHLORIDE SERPL-SCNC: 100 MMOL/L (ref 94–109)
CHLORIDE SERPL-SCNC: 100 MMOL/L (ref 94–109)
CO2 SERPL-SCNC: 32 MMOL/L (ref 20–32)
CO2 SERPL-SCNC: 32 MMOL/L (ref 20–32)
CREAT SERPL-MCNC: 0.96 MG/DL (ref 0.66–1.25)
CREAT SERPL-MCNC: 1.02 MG/DL (ref 0.66–1.25)
DIFFERENTIAL METHOD BLD: ABNORMAL
EOSINOPHIL # BLD AUTO: 0.2 10E9/L (ref 0–0.7)
EOSINOPHIL NFR BLD AUTO: 1.5 %
ERYTHROCYTE [DISTWIDTH] IN BLOOD BY AUTOMATED COUNT: 17.1 % (ref 10–15)
GFR SERPL CREATININE-BSD FRML MDRD: 79 ML/MIN/1.7M2
GFR SERPL CREATININE-BSD FRML MDRD: 85 ML/MIN/1.7M2
GLUCOSE SERPL-MCNC: 108 MG/DL (ref 70–99)
GLUCOSE SERPL-MCNC: 111 MG/DL (ref 70–99)
HCT VFR BLD AUTO: 26.8 % (ref 40–53)
HGB BLD-MCNC: 8.5 G/DL (ref 13.3–17.7)
IMM GRANULOCYTES # BLD: 0.1 10E9/L (ref 0–0.4)
IMM GRANULOCYTES NFR BLD: 0.5 %
LYMPHOCYTES # BLD AUTO: 1.2 10E9/L (ref 0.8–5.3)
LYMPHOCYTES NFR BLD AUTO: 9.5 %
MAGNESIUM SERPL-MCNC: 2 MG/DL (ref 1.6–2.3)
MCH RBC QN AUTO: 26 PG (ref 26.5–33)
MCHC RBC AUTO-ENTMCNC: 31.7 G/DL (ref 31.5–36.5)
MCV RBC AUTO: 82 FL (ref 78–100)
MONOCYTES # BLD AUTO: 0.7 10E9/L (ref 0–1.3)
MONOCYTES NFR BLD AUTO: 5.6 %
NEUTROPHILS # BLD AUTO: 10 10E9/L (ref 1.6–8.3)
NEUTROPHILS NFR BLD AUTO: 82.4 %
NRBC # BLD AUTO: 0 10*3/UL
NRBC BLD AUTO-RTO: 0 /100
PLATELET # BLD AUTO: 510 10E9/L (ref 150–450)
POTASSIUM SERPL-SCNC: 3.8 MMOL/L (ref 3.4–5.3)
POTASSIUM SERPL-SCNC: 3.9 MMOL/L (ref 3.4–5.3)
RBC # BLD AUTO: 3.27 10E12/L (ref 4.4–5.9)
SODIUM SERPL-SCNC: 137 MMOL/L (ref 133–144)
SODIUM SERPL-SCNC: 139 MMOL/L (ref 133–144)
WBC # BLD AUTO: 12.1 10E9/L (ref 4–11)

## 2017-03-05 PROCEDURE — 83735 ASSAY OF MAGNESIUM: CPT | Performed by: STUDENT IN AN ORGANIZED HEALTH CARE EDUCATION/TRAINING PROGRAM

## 2017-03-05 PROCEDURE — 97116 GAIT TRAINING THERAPY: CPT | Mod: GP | Performed by: PHYSICAL THERAPIST

## 2017-03-05 PROCEDURE — 25000132 ZZH RX MED GY IP 250 OP 250 PS 637: Performed by: STUDENT IN AN ORGANIZED HEALTH CARE EDUCATION/TRAINING PROGRAM

## 2017-03-05 PROCEDURE — 12000001 ZZH R&B MED SURG/OB UMMC

## 2017-03-05 PROCEDURE — 40000193 ZZH STATISTIC PT WARD VISIT: Performed by: PHYSICAL THERAPIST

## 2017-03-05 PROCEDURE — 25000128 H RX IP 250 OP 636: Performed by: INTERNAL MEDICINE

## 2017-03-05 PROCEDURE — 25000125 ZZHC RX 250: Performed by: STUDENT IN AN ORGANIZED HEALTH CARE EDUCATION/TRAINING PROGRAM

## 2017-03-05 PROCEDURE — 97530 THERAPEUTIC ACTIVITIES: CPT | Mod: GP | Performed by: PHYSICAL THERAPIST

## 2017-03-05 PROCEDURE — 85025 COMPLETE CBC W/AUTO DIFF WBC: CPT | Performed by: STUDENT IN AN ORGANIZED HEALTH CARE EDUCATION/TRAINING PROGRAM

## 2017-03-05 PROCEDURE — 27210436 ZZH NUTRITION PRODUCT SEMIELEM INTERMED CAN

## 2017-03-05 PROCEDURE — 25000132 ZZH RX MED GY IP 250 OP 250 PS 637: Performed by: INTERNAL MEDICINE

## 2017-03-05 PROCEDURE — 99233 SBSQ HOSP IP/OBS HIGH 50: CPT | Mod: GC | Performed by: INTERNAL MEDICINE

## 2017-03-05 PROCEDURE — 97530 THERAPEUTIC ACTIVITIES: CPT | Mod: GO

## 2017-03-05 PROCEDURE — 25000128 H RX IP 250 OP 636: Performed by: STUDENT IN AN ORGANIZED HEALTH CARE EDUCATION/TRAINING PROGRAM

## 2017-03-05 PROCEDURE — 80048 BASIC METABOLIC PNL TOTAL CA: CPT | Performed by: STUDENT IN AN ORGANIZED HEALTH CARE EDUCATION/TRAINING PROGRAM

## 2017-03-05 PROCEDURE — 36415 COLL VENOUS BLD VENIPUNCTURE: CPT | Performed by: STUDENT IN AN ORGANIZED HEALTH CARE EDUCATION/TRAINING PROGRAM

## 2017-03-05 PROCEDURE — 40000133 ZZH STATISTIC OT WARD VISIT

## 2017-03-05 PROCEDURE — 97110 THERAPEUTIC EXERCISES: CPT | Mod: GP | Performed by: PHYSICAL THERAPIST

## 2017-03-05 RX ORDER — MORPHINE SULFATE 2 MG/ML
1-2 INJECTION, SOLUTION INTRAMUSCULAR; INTRAVENOUS EVERY 6 HOURS PRN
Status: DISCONTINUED | OUTPATIENT
Start: 2017-03-05 | End: 2017-03-07

## 2017-03-05 RX ORDER — MORPHINE SULFATE 15 MG/1
7.5 TABLET ORAL EVERY 4 HOURS PRN
Status: DISCONTINUED | OUTPATIENT
Start: 2017-03-05 | End: 2017-03-07

## 2017-03-05 RX ORDER — FUROSEMIDE 20 MG
40 TABLET ORAL DAILY
Status: DISCONTINUED | OUTPATIENT
Start: 2017-03-06 | End: 2017-03-09

## 2017-03-05 RX ORDER — ACETAMINOPHEN 500 MG
500 TABLET ORAL EVERY 6 HOURS
Status: DISCONTINUED | OUTPATIENT
Start: 2017-03-05 | End: 2017-03-07

## 2017-03-05 RX ADMIN — PANCRELIPASE 72000 UNITS: 24000; 76000; 120000 CAPSULE, DELAYED RELEASE PELLETS ORAL at 15:57

## 2017-03-05 RX ADMIN — HYDROMORPHONE HYDROCHLORIDE 0.5 MG: 10 INJECTION, SOLUTION INTRAMUSCULAR; INTRAVENOUS; SUBCUTANEOUS at 01:49

## 2017-03-05 RX ADMIN — Medication 7.5 MG: at 22:44

## 2017-03-05 RX ADMIN — Medication 2 SPRAY: at 13:43

## 2017-03-05 RX ADMIN — Medication 7.5 MG: at 14:54

## 2017-03-05 RX ADMIN — SODIUM BICARBONATE 325 MG: 325 TABLET ORAL at 11:19

## 2017-03-05 RX ADMIN — Medication 7.5 MG: at 18:59

## 2017-03-05 RX ADMIN — MULTIVITAMIN 15 ML: LIQUID ORAL at 11:28

## 2017-03-05 RX ADMIN — PANCRELIPASE 72000 UNITS: 24000; 76000; 120000 CAPSULE, DELAYED RELEASE PELLETS ORAL at 19:59

## 2017-03-05 RX ADMIN — LINEZOLID 600 MG: 600 INJECTION, SOLUTION INTRAVENOUS at 02:10

## 2017-03-05 RX ADMIN — SODIUM BICARBONATE 325 MG: 325 TABLET ORAL at 02:09

## 2017-03-05 RX ADMIN — ACETAMINOPHEN 500 MG: 500 TABLET, FILM COATED ORAL at 14:54

## 2017-03-05 RX ADMIN — LINEZOLID 600 MG: 600 INJECTION, SOLUTION INTRAVENOUS at 14:58

## 2017-03-05 RX ADMIN — SODIUM BICARBONATE 325 MG: 325 TABLET ORAL at 19:59

## 2017-03-05 RX ADMIN — MELATONIN TAB 3 MG 3 MG: 3 TAB at 22:44

## 2017-03-05 RX ADMIN — OXYCODONE HYDROCHLORIDE AND ACETAMINOPHEN 1 TABLET: 5; 325 TABLET ORAL at 10:10

## 2017-03-05 RX ADMIN — ACETAMINOPHEN 500 MG: 500 TABLET, FILM COATED ORAL at 22:44

## 2017-03-05 RX ADMIN — SODIUM BICARBONATE 325 MG: 325 TABLET ORAL at 06:14

## 2017-03-05 RX ADMIN — POTASSIUM CHLORIDE 20 MEQ: 750 TABLET, EXTENDED RELEASE ORAL at 18:59

## 2017-03-05 RX ADMIN — PANCRELIPASE 72000 UNITS: 24000; 76000; 120000 CAPSULE, DELAYED RELEASE PELLETS ORAL at 06:14

## 2017-03-05 RX ADMIN — OXYCODONE HYDROCHLORIDE AND ACETAMINOPHEN 1 TABLET: 5; 325 TABLET ORAL at 05:09

## 2017-03-05 RX ADMIN — MICAFUNGIN SODIUM 100 MG: 10 INJECTION, POWDER, LYOPHILIZED, FOR SOLUTION INTRAVENOUS at 16:35

## 2017-03-05 RX ADMIN — PANCRELIPASE 72000 UNITS: 24000; 76000; 120000 CAPSULE, DELAYED RELEASE PELLETS ORAL at 11:19

## 2017-03-05 RX ADMIN — MEROPENEM 1 G: 1 INJECTION, POWDER, FOR SOLUTION INTRAVENOUS at 13:38

## 2017-03-05 RX ADMIN — POTASSIUM CHLORIDE 20 MEQ: 750 TABLET, EXTENDED RELEASE ORAL at 10:10

## 2017-03-05 RX ADMIN — PANTOPRAZOLE SODIUM 40 MG: 40 TABLET, DELAYED RELEASE ORAL at 08:33

## 2017-03-05 RX ADMIN — MEROPENEM 1 G: 1 INJECTION, POWDER, FOR SOLUTION INTRAVENOUS at 22:45

## 2017-03-05 RX ADMIN — SODIUM BICARBONATE 325 MG: 325 TABLET ORAL at 15:57

## 2017-03-05 RX ADMIN — PANCRELIPASE 72000 UNITS: 24000; 76000; 120000 CAPSULE, DELAYED RELEASE PELLETS ORAL at 02:09

## 2017-03-05 RX ADMIN — MEROPENEM 1 G: 1 INJECTION, POWDER, FOR SOLUTION INTRAVENOUS at 06:12

## 2017-03-05 RX ADMIN — FUROSEMIDE 20 MG: 10 INJECTION, SOLUTION INTRAVENOUS at 08:33

## 2017-03-05 RX ADMIN — Medication 2 SPRAY: at 19:02

## 2017-03-05 RX ADMIN — Medication 2 G: at 10:45

## 2017-03-05 RX ADMIN — Medication 2 SPRAY: at 08:34

## 2017-03-05 RX ADMIN — NICOTINE 1 PATCH: 14 PATCH, EXTENDED RELEASE TRANSDERMAL at 08:31

## 2017-03-05 ASSESSMENT — PAIN DESCRIPTION - DESCRIPTORS
DESCRIPTORS: ACHING
DESCRIPTORS: ACHING
DESCRIPTORS: ACHING;SORE

## 2017-03-05 NOTE — PLAN OF CARE
Problem: Goal Outcome Summary  Goal: Goal Outcome Summary  Outcome: Therapy, progress toward functional goals as expected  OT/7D - Facilitated functional mobility ~225 feet with fww and SBA; pt tolerates activity well and VS stable on RA during session. Demonstrated compensatory apporach for increased IND with LB dressing; however, pt unable to utilize technique at this time due to LE swelling and stafford. Pt limited by fatigue, activity tolerance, swelling.     Pending continued progress and participation with OT and PT, patient will be able to to discharge to home with assist from wife.

## 2017-03-05 NOTE — PLAN OF CARE
Problem: Goal Outcome Summary  Goal: Goal Outcome Summary  PT 7D- pt seen for gait, LE exercise, transfers. Pt is able to walk without AD but he is unsteady at this time and does better with UE support. Pt fatigues easily with any activity. Pt walked 183 feet with support of IV pole x 2. Pt should be able to return home with assist of wife. Pt may need further PT thru OP or home PT at discharge if he does not get stronger and improve his gait and balance.

## 2017-03-05 NOTE — PLAN OF CARE
Problem: Goal Outcome Summary  Goal: Goal Outcome Summary  Outcome: No Change  [id number]: Pt was transferred to 7D from  this shift. Oriented to room, call light, bathroom, etc. Afebrile. VSS. On 2LPM to maintain sats above 92%. Potassium was replaced; recheck with AM labs. Pt c/o abdominal pain; PO Percocet given twice and IV dilaudid once with partial relief. Pt c/o mild nausea; instructed to raise HOB with TF; nausea then resolved. Greenberg patent with adequate output. Catheter care completed this shift. Lymphedema wraps in place. Melatonin given at HS; pt slept between cares. Needs encouragement for OOB activity. Commode at bedside. TF at goal rate of 85ml/hr. Cont to monitor and with POC.

## 2017-03-05 NOTE — PROGRESS NOTES
Transfer to 7D  Via:bed  Reason for transfer:  Pt appropriate for 7D- improved  patient condition  Family: Aware of transfer  Belongings: Received with pt  Chart: Received with pt  Medications: Meds received from old unit with pt  Report given to 7D RN Shelby.  Pt status:  PT is stable.

## 2017-03-05 NOTE — PROGRESS NOTES
[facility]   Internal Medicine Progress Note     Patient Name: Jayson Bella   : 1970, Age: 46 year old  Admission Date: 2017   Primary Team: Maroon 3     Assessment & Plan     45 yo M with hx of gallstone pancreatitis s/p cholecystectomy in 2016 c/b biliary obstruction due to large pseudocyst s/p ERCP with stent placement 2017 who was transferred from OSH for sepsis, fever, severe leukocytosis concerning for necrotizing pancreatitis and abscess formation.    Changes today:  - continue diuresis & repletions  - continue to monitor  - switch pain meds to morphine   - schedule tylenol    # necrotizing pancreatitis, previously septic  # summer-pancreatic acute fluid collections/walled off necrosis  -180, Lipase 1070, Procal 0.43-0.48, peak WBC 29.2 on  at OSH, paracentesis at OSH with counts not suggestive of peritonitis, cultures negative but no fungal cultures sent. Pleural fluid cultures from outside hospital also with NGTD but no fungal cultures sent. 3/1/17 CT abd w/IV contrast confirms multiple rim enhancing fluid pockets & GGOs in lung. Blood Cx's collected while pt was on chio & vanc (after transfer), again when febrile, on evening of  after afternoon meropenem dose was held, all NG final.  PLAN  - diagnostic pericentesis with IR 3/2 w/VRE+, start linezolid (3/4 -> )  - EUS/cystogastrostomy fluid from 3/3 w/yeast+, start micafungin (3/4 ->)  - continue meropenem (since prior to  at OSH -> )  - monitor for fever, hemodynamic instability  - daily CBCs and QOD CRP, procal    # hypervolemia, ascites likely secondary to IVF in setting of sepsis and hypoalbuminemia  - recent weight of 106 kg on   - strict I&O, stafford due to urinary retention. Start tamsolusin before d/c'ing stafford  - daily weights  - start diuresing again today with 20 mg IV lasix BID, check electrolytes BID and replete prn  - K/M repletion protocols with goal 4/2    # Severe  malnutrition  - Continue TF (hold tonight for EUS/cystgastrostomy 3/3)  - pancreatic enzymes    # L sided pleural effusion - chest tube removed 2/26  - per chart review, pleural fluid had 1152 nucleated cells, 42 PMNs, LDH of 183, Protein 2.9, and no growth at 3 days. However, LDH and protein were not checked in the serum in or around the date of thoracentesis. If using total protein on admission, protein ratio is 0.52 which fulfills criteria for exudative. No growth from pleural effusion cultures from OSH.  - chest tube removed 2/26, f/u xr without evidence of pneumo    # pain, insomnia  - pt has had significant AMS with trazodone and fentanyl.  - dilaudid IV PRN available  - percocet q4h prn available, preferentially use this  - seroquel 25 mg QHS prn    # resolved problems  1. JUAN, likely 2/2 intravascular volume depletion in setting of sepsis, with contribution from low albumin. Had JUAN from OSH. Abd US w/ascites and normal kidneys. Resolved 3/4/17    FEN: NJTFs per nutrition, full liquid  Prophylaxis:  DVT: SCDs, heparin subq    Lung: IS, OOB  GI: pantoprazole QDay  Code Status: FULL CODE  Disposition: pending further evaluation of pancreas and intervention as necessary    Patient seen and discussed with Dr. Rose Domingo MD, PhD  PGY-1, Internal Medicine  [id number]      Interval History     In procedure this AM  In PM, seen at beside in PACU. Some dyspnea with hypoxia on 8L O2 post procedure.    4 point ROS is otherwise negative including Resp, CVS, GI, .      Vitals, Exam, Data     Physical exam:  Temp:  [96.9  F (36.1  C)-98.6  F (37  C)] 97.6  F (36.4  C)  Pulse:  [87] 87  Heart Rate:  [78-92] 92  Resp:  [16-24] 20  BP: (122-151)/(80-91) 151/90  SpO2:  [90 %-97 %] 97 % on     Wt Readings from Last 2 Encounters:   03/04/17 135.8 kg (299 lb 6.4 oz)         Intake/Output Summary (Last 24 hours) at 03/05/17 0706  Last data filed at 03/05/17 0630   Gross per 24 hour   Intake             2635 ml    Output             5150 ml   Net            -2515 ml     Physical Exam:   General: Pleasant, resting in bed  HEENT: mmm, NJ in place  Cardiac: Normal rate, regular rhythm. No m/r/g. Normal S1, S2.  Pulm: decreased at bases, otherwise clear throughout  Abd: Soft, diffusely tender, moderately distended.  Extremities: 1-2+ Bilateral LE edema, wraps in palce  Neuro: A&Ox3, no focal deficits    CBC    Recent Labs  Lab 03/05/17  0537 03/03/17  0157 03/02/17  0821 03/01/17  0947   WBC 12.1* 13.4* 16.5* 21.2*   HGB 8.5* 8.4* 8.9* 9.4*   MCV 82 83 83 83   * 475* 467* 477*       BMP    Recent Labs  Lab 03/05/17  0537 03/04/17  1728 03/04/17  1251 03/04/17  0534 03/03/17  1843 03/02/17  0821  02/28/17  0637 02/27/17  0626    138  --  136 141 143  < > 138 143   POTASSIUM 3.9 3.6 3.5 3.6 4.0 3.6  < > 4.2 4.0   CHLORIDE 100 100  --  100 103 105  < > 103 108   CO2 32 32  --  30 27 28  < > 29 28   ANIONGAP 7 6  --  7 11 11  < > 6 7   * 107*  --  94 97 106*  < > 120* 92   BUN 31* 31*  --  29 30 36*  < > 36* 33*   CR 0.96 1.03  --  1.00 1.04 1.13  < > 1.44* 1.68*   YAN 7.7* 7.8*  --  7.8* 7.6* 7.3*  < > 7.2* 7.5*   MAG 2.0  --  1.7 1.6  --  1.7  < > 1.8 2.0   PHOS  --   --   --  3.5  --   --   --  3.0 3.9   < > = values in this interval not displayed.     INR    Recent Labs  Lab 03/03/17  0157   INR 1.14       Liver panel    Recent Labs  Lab 03/02/17  0821 03/01/17  0947 02/28/17  0637   PROTTOTAL  --  6.1* 5.8*   ALBUMIN 1.2* 1.3* 1.2*   BILITOTAL  --  0.3  --    ALKPHOS  --  87  --    AST  --  20 31   ALT  --  15 16     Imaging, CT abd w/contrast 3/1/17:  1. Necrotizing pancreatitis with acute pancreatic collections/walled  off necrosis in the body and the tail of the pancreas. There are  additional rim-enhancing walled off necrosis within the abdomen, at  least one new from prior, , none of these collections contain gas.  2. Moderate left pleural effusion with interval removal of the left  basilar chest  tube.  3. Groundglass opacities in the periphery of the right middle lobe  and lingula, concerning for infection versus aspiration.  4. Bibasilar atelectasis/consolidation.

## 2017-03-06 ENCOUNTER — APPOINTMENT (OUTPATIENT)
Dept: PHYSICAL THERAPY | Facility: CLINIC | Age: 47
DRG: 871 | End: 2017-03-06
Attending: INTERNAL MEDICINE

## 2017-03-06 ENCOUNTER — APPOINTMENT (OUTPATIENT)
Dept: OCCUPATIONAL THERAPY | Facility: CLINIC | Age: 47
DRG: 871 | End: 2017-03-06
Attending: INTERNAL MEDICINE

## 2017-03-06 LAB
ANION GAP SERPL CALCULATED.3IONS-SCNC: 5 MMOL/L (ref 3–14)
BASOPHILS # BLD AUTO: 0 10E9/L (ref 0–0.2)
BASOPHILS NFR BLD AUTO: 0.2 %
BUN SERPL-MCNC: 31 MG/DL (ref 7–30)
CALCIUM SERPL-MCNC: 7.7 MG/DL (ref 8.5–10.1)
CHLORIDE SERPL-SCNC: 102 MMOL/L (ref 94–109)
CO2 SERPL-SCNC: 30 MMOL/L (ref 20–32)
CREAT SERPL-MCNC: 0.98 MG/DL (ref 0.66–1.25)
CRP SERPL-MCNC: 90 MG/L (ref 0–8)
DIFFERENTIAL METHOD BLD: ABNORMAL
EOSINOPHIL # BLD AUTO: 0.2 10E9/L (ref 0–0.7)
EOSINOPHIL NFR BLD AUTO: 1.5 %
ERYTHROCYTE [DISTWIDTH] IN BLOOD BY AUTOMATED COUNT: 17.1 % (ref 10–15)
GFR SERPL CREATININE-BSD FRML MDRD: 83 ML/MIN/1.7M2
GLUCOSE SERPL-MCNC: 108 MG/DL (ref 70–99)
HCT VFR BLD AUTO: 25.7 % (ref 40–53)
HGB BLD-MCNC: 8 G/DL (ref 13.3–17.7)
IMM GRANULOCYTES # BLD: 0.1 10E9/L (ref 0–0.4)
IMM GRANULOCYTES NFR BLD: 0.5 %
LACTATE BLD-SCNC: 0.6 MMOL/L (ref 0.7–2.1)
LYMPHOCYTES # BLD AUTO: 1.1 10E9/L (ref 0.8–5.3)
LYMPHOCYTES NFR BLD AUTO: 8.7 %
MAGNESIUM SERPL-MCNC: 2 MG/DL (ref 1.6–2.3)
MCH RBC QN AUTO: 25.5 PG (ref 26.5–33)
MCHC RBC AUTO-ENTMCNC: 31.1 G/DL (ref 31.5–36.5)
MCV RBC AUTO: 82 FL (ref 78–100)
MONOCYTES # BLD AUTO: 0.9 10E9/L (ref 0–1.3)
MONOCYTES NFR BLD AUTO: 6.6 %
NEUTROPHILS # BLD AUTO: 10.7 10E9/L (ref 1.6–8.3)
NEUTROPHILS NFR BLD AUTO: 82.5 %
NRBC # BLD AUTO: 0 10*3/UL
NRBC BLD AUTO-RTO: 0 /100
PLATELET # BLD AUTO: 527 10E9/L (ref 150–450)
POTASSIUM SERPL-SCNC: 3.9 MMOL/L (ref 3.4–5.3)
RBC # BLD AUTO: 3.14 10E12/L (ref 4.4–5.9)
SODIUM SERPL-SCNC: 138 MMOL/L (ref 133–144)
WBC # BLD AUTO: 13 10E9/L (ref 4–11)

## 2017-03-06 PROCEDURE — 12000001 ZZH R&B MED SURG/OB UMMC

## 2017-03-06 PROCEDURE — 25000128 H RX IP 250 OP 636: Performed by: STUDENT IN AN ORGANIZED HEALTH CARE EDUCATION/TRAINING PROGRAM

## 2017-03-06 PROCEDURE — 25000132 ZZH RX MED GY IP 250 OP 250 PS 637: Performed by: STUDENT IN AN ORGANIZED HEALTH CARE EDUCATION/TRAINING PROGRAM

## 2017-03-06 PROCEDURE — 27210436 ZZH NUTRITION PRODUCT SEMIELEM INTERMED CAN

## 2017-03-06 PROCEDURE — 40000193 ZZH STATISTIC PT WARD VISIT: Performed by: PHYSICAL THERAPIST

## 2017-03-06 PROCEDURE — 97140 MANUAL THERAPY 1/> REGIONS: CPT | Mod: GO | Performed by: OCCUPATIONAL THERAPIST

## 2017-03-06 PROCEDURE — 40000133 ZZH STATISTIC OT WARD VISIT: Performed by: OCCUPATIONAL THERAPIST

## 2017-03-06 PROCEDURE — 97110 THERAPEUTIC EXERCISES: CPT | Mod: GP | Performed by: PHYSICAL THERAPIST

## 2017-03-06 PROCEDURE — 25000125 ZZHC RX 250: Performed by: STUDENT IN AN ORGANIZED HEALTH CARE EDUCATION/TRAINING PROGRAM

## 2017-03-06 PROCEDURE — 83605 ASSAY OF LACTIC ACID: CPT | Performed by: INTERNAL MEDICINE

## 2017-03-06 PROCEDURE — 85025 COMPLETE CBC W/AUTO DIFF WBC: CPT | Performed by: STUDENT IN AN ORGANIZED HEALTH CARE EDUCATION/TRAINING PROGRAM

## 2017-03-06 PROCEDURE — 36415 COLL VENOUS BLD VENIPUNCTURE: CPT | Performed by: STUDENT IN AN ORGANIZED HEALTH CARE EDUCATION/TRAINING PROGRAM

## 2017-03-06 PROCEDURE — 86140 C-REACTIVE PROTEIN: CPT | Performed by: STUDENT IN AN ORGANIZED HEALTH CARE EDUCATION/TRAINING PROGRAM

## 2017-03-06 PROCEDURE — 25000132 ZZH RX MED GY IP 250 OP 250 PS 637: Performed by: INTERNAL MEDICINE

## 2017-03-06 PROCEDURE — 83735 ASSAY OF MAGNESIUM: CPT | Performed by: STUDENT IN AN ORGANIZED HEALTH CARE EDUCATION/TRAINING PROGRAM

## 2017-03-06 PROCEDURE — 80048 BASIC METABOLIC PNL TOTAL CA: CPT | Performed by: STUDENT IN AN ORGANIZED HEALTH CARE EDUCATION/TRAINING PROGRAM

## 2017-03-06 PROCEDURE — 36415 COLL VENOUS BLD VENIPUNCTURE: CPT | Performed by: INTERNAL MEDICINE

## 2017-03-06 PROCEDURE — 99233 SBSQ HOSP IP/OBS HIGH 50: CPT | Mod: GC | Performed by: INTERNAL MEDICINE

## 2017-03-06 PROCEDURE — 97116 GAIT TRAINING THERAPY: CPT | Mod: GP | Performed by: PHYSICAL THERAPIST

## 2017-03-06 PROCEDURE — 25000128 H RX IP 250 OP 636: Performed by: INTERNAL MEDICINE

## 2017-03-06 RX ORDER — SODIUM BICARBONATE 325 MG/1
325 TABLET ORAL
Status: DISCONTINUED | OUTPATIENT
Start: 2017-03-06 | End: 2017-03-15

## 2017-03-06 RX ADMIN — POTASSIUM CHLORIDE 20 MEQ: 1.5 POWDER, FOR SOLUTION ORAL at 16:26

## 2017-03-06 RX ADMIN — LINEZOLID 600 MG: 600 INJECTION, SOLUTION INTRAVENOUS at 17:16

## 2017-03-06 RX ADMIN — QUETIAPINE FUMARATE 25 MG: 25 TABLET, FILM COATED ORAL at 22:46

## 2017-03-06 RX ADMIN — MEROPENEM 1 G: 1 INJECTION, POWDER, FOR SOLUTION INTRAVENOUS at 16:36

## 2017-03-06 RX ADMIN — NICOTINE 1 PATCH: 14 PATCH, EXTENDED RELEASE TRANSDERMAL at 08:15

## 2017-03-06 RX ADMIN — LINEZOLID 600 MG: 600 INJECTION, SOLUTION INTRAVENOUS at 03:08

## 2017-03-06 RX ADMIN — PANTOPRAZOLE SODIUM 40 MG: 40 TABLET, DELAYED RELEASE ORAL at 08:13

## 2017-03-06 RX ADMIN — MELATONIN TAB 3 MG 3 MG: 3 TAB at 22:46

## 2017-03-06 RX ADMIN — MICAFUNGIN SODIUM 100 MG: 10 INJECTION, POWDER, LYOPHILIZED, FOR SOLUTION INTRAVENOUS at 19:07

## 2017-03-06 RX ADMIN — ACETAMINOPHEN 500 MG: 500 TABLET, FILM COATED ORAL at 04:30

## 2017-03-06 RX ADMIN — MULTIVITAMIN 15 ML: LIQUID ORAL at 08:14

## 2017-03-06 RX ADMIN — SODIUM BICARBONATE 325 MG: 325 TABLET ORAL at 08:10

## 2017-03-06 RX ADMIN — PANCRELIPASE 72000 UNITS: 24000; 76000; 120000 CAPSULE, DELAYED RELEASE PELLETS ORAL at 16:26

## 2017-03-06 RX ADMIN — ACETAMINOPHEN 500 MG: 500 TABLET, FILM COATED ORAL at 19:09

## 2017-03-06 RX ADMIN — Medication 2 SPRAY: at 08:14

## 2017-03-06 RX ADMIN — MORPHINE SULFATE 1 MG: 2 INJECTION, SOLUTION INTRAMUSCULAR; INTRAVENOUS at 01:19

## 2017-03-06 RX ADMIN — PANCRELIPASE 72000 UNITS: 24000; 76000; 120000 CAPSULE, DELAYED RELEASE PELLETS ORAL at 04:36

## 2017-03-06 RX ADMIN — FUROSEMIDE 40 MG: 20 TABLET ORAL at 08:13

## 2017-03-06 RX ADMIN — Medication 2 G: at 20:35

## 2017-03-06 RX ADMIN — SODIUM BICARBONATE 325 MG: 325 TABLET ORAL at 12:04

## 2017-03-06 RX ADMIN — PANCRELIPASE 72000 UNITS: 24000; 76000; 120000 CAPSULE, DELAYED RELEASE PELLETS ORAL at 01:01

## 2017-03-06 RX ADMIN — Medication 7.5 MG: at 08:36

## 2017-03-06 RX ADMIN — PANCRELIPASE 72000 UNITS: 24000; 76000; 120000 CAPSULE, DELAYED RELEASE PELLETS ORAL at 12:04

## 2017-03-06 RX ADMIN — ACETAMINOPHEN 500 MG: 500 TABLET, FILM COATED ORAL at 12:03

## 2017-03-06 RX ADMIN — Medication 7.5 MG: at 12:26

## 2017-03-06 RX ADMIN — PANCRELIPASE 72000 UNITS: 24000; 76000; 120000 CAPSULE, DELAYED RELEASE PELLETS ORAL at 08:10

## 2017-03-06 RX ADMIN — PANCRELIPASE 72000 UNITS: 24000; 76000; 120000 CAPSULE, DELAYED RELEASE PELLETS ORAL at 20:29

## 2017-03-06 RX ADMIN — Medication 7.5 MG: at 17:33

## 2017-03-06 RX ADMIN — SODIUM BICARBONATE 325 MG: 325 TABLET ORAL at 01:01

## 2017-03-06 RX ADMIN — SODIUM BICARBONATE 325 MG: 325 TABLET ORAL at 04:36

## 2017-03-06 RX ADMIN — SODIUM BICARBONATE 325 MG: 325 TABLET ORAL at 20:30

## 2017-03-06 RX ADMIN — Medication 2 SPRAY: at 12:04

## 2017-03-06 RX ADMIN — SODIUM BICARBONATE 325 MG: 325 TABLET ORAL at 16:26

## 2017-03-06 RX ADMIN — Medication 7.5 MG: at 22:45

## 2017-03-06 RX ADMIN — Medication 7.5 MG: at 04:30

## 2017-03-06 RX ADMIN — MEROPENEM 1 G: 1 INJECTION, POWDER, FOR SOLUTION INTRAVENOUS at 08:38

## 2017-03-06 ASSESSMENT — PAIN DESCRIPTION - DESCRIPTORS
DESCRIPTORS: ACHING

## 2017-03-06 NOTE — PROGRESS NOTES
Nutrition Progress Note - f/u for progress towards previous nutrition POC (see previous 3/3 reassessment for details)    Per discussion with pt, pt does not have an appetite to eat. He trialed the Ensure Plus, but did not like the supplement. Currently on tube feeds of Impact Peptide 1.5 @ 85 mL/hr (2040 ml/day) to provide 3060 kcals, 192 g PRO, 1571 ml free H2O, 131 g Fat (50% from MCTs), 286 g CHO and no Fiber daily to meet 100% of nutrition needs. Pt also continues on enzymes Creon 24- 3 capsules mixed with sodium bicarb every 4 hours.    Interventions:  Collaboration with providers/care coordinator: Per discussion with MD, trialed Ensure Plus today but pt didn't like. MD suspects pt will not be able to maintain adequate PO intake and desires to cycle tube feeds. Per discussion with care coordinator, she is looking into prices regarding tube feed formulas without insurance. Per discussion with team, pt and pt's family is aware of the high cost of enteral nutrition without insurance.     Medical Food Supplement: Discussed POC regarding tube feeds with pt. Also discussed PO intake and supplements with pt. Pt open to trying other supplements. Ordered Magic Cup (vanillia) for pt to try.     EN Schedule: Advance tube feed by 10 mL q 4 hrs to new goal of 115 mL/hr x 16 hours per day to provide 1840 mL, 2760 kcals (26 kcal/kg), 173 g pro (1.6 g/kg), 118 g fat, 256 g CHO, 0 g fiber, and 1417 mL free water to meet 100% of assessed needs.    Updated enzyme and bicarb regimen (continue Creon 24 3 capsules every 4 hrs x 16 hrs). Note: this enzyme regimen with TF @ goal infusion will provide approx 2441 units of lipase/gram of total Fat daily and approp dosing initially for pancreatic insufficiency with more elemental TF formula.     Recommendations:  Recommend advancing diet as medically appropriate in attempts to increase PO intake.     Juju Cardenas, Dietetic Intern     RD has read and agrees with above.    Lidia Delacruz  RD,ALETHA   Pager [phone]

## 2017-03-06 NOTE — PLAN OF CARE
"Problem: Goal Outcome Summary  Goal: Goal Outcome Summary  /84 (BP Location: Left arm)  Pulse 87  Temp 99  F (37.2  C) (Oral)  Resp 22  Ht 1.905 m (6' 3\")  Wt 130 kg (286 lb 11.2 oz)  SpO2 93%  BMI 35.84 kg/m2   9963-9430: A&Ox4, lethargic, Tmax 99.2, tachypneic respirations, OVSS. Abdominal pain managed w/ PRN morphine PO x 2 and PRN morphine IV x 1, plus scheduled tylenol. Denies nausea. Denies SOB, lung sounds diminished in lower lungs, encouraged use of IS. Lymphedema wraps in place on BLE.  Stafford bag was leaking, bag replaced; stafford now patent with large output. Up w/ SBA, walker and GB. IV antibiotics given per MAR. Will continue to monitor and follow POC.      "

## 2017-03-06 NOTE — PLAN OF CARE
Problem: Goal Outcome Summary  Goal: Goal Outcome Summary  Edema 7D: Wraps removed and measurements taken with 101/12 noted reductions bilaterally. Skin intact with deep 2+ pitting remaining in distal LEs. Skin cares performed prior to reapplication of GCB from MTPs to knee creases for continued edema management and increased ease with functional mobility. Remove if causing pain/numnbess. Will transition to compression stockings when appropriate with fit for continued management when returning home.

## 2017-03-06 NOTE — PROGRESS NOTES
[facility]   Internal Medicine Progress Note     Patient Name: Jayson Bella   : 1970, Age: 46 year old  Admission Date: 2017   Primary Team: Maroon 3     Assessment & Plan     47 yo M with hx of gallstone pancreatitis s/p cholecystectomy in 2016 c/b biliary obstruction due to large pseudocyst s/p ERCP with stent placement 2017 who was transferred from OSH for sepsis, fever, severe leukocytosis concerning for necrotizing pancreatitis and abscess formation.    Changes today:  - working towards transitioning to PO antimicrobials if possible  - working towards outpatient nutrition plan tube. Would like to cycle tube feeds overnight    # necrotizing pancreatitis c/b infected summer-pancreatic walled off necrosis and loculated peritoneal fluid collections  -180, Lipase 1070, Procal 0.43-0.48, peak WBC 29.2 on  at OSH, paracentesis at OSH with counts not suggestive of peritonitis, cultures negative but no fungal cultures sent. Pleural fluid cultures from outside hospital also with NGTD but no fungal cultures sent. 3/1/17 CT abd w/IV contrast confirms multiple rim enhancing fluid pockets & GGOs in lung. Blood Cx's collected while pt was on chio & vanc (after transfer), again when febrile, on evening of  after afternoon meropenem dose was held, all NG final.  PLAN  - diagnostic pericentesis with IR 3/2 w/VRE+, start linezolid (3/4 -> )  - EUS/cystogastrostomy fluid from 3/3 w/yeast+, start micafungin (3/4 ->)  - continue meropenem (since prior to  at OSH -> )  - monitor for fever, hemodynamic instability  - daily CBCs and QOD CRP, procal    # hypervolemia, ascites likely secondary to IVF in setting of sepsis and hypoalbuminemia  - recent weight of 106 kg on   - strict I&O, stafford due to urinary retention. Start tamsolusin before d/c'ing stafford  - daily weights  - transition to daily furosemide 40 mg PO daily  - K/M repletion protocols with goal     #   malnutrition  - Would like to start cycling TFs overnight, allow for PO intake during day. Dietician working on it now.  - pt will likely need pancreatic enzymes    # L sided pleural effusion - chest tube removed 2/26  - per chart review, pleural fluid had 1152 nucleated cells, 42 PMNs, LDH of 183, Protein 2.9, and no growth at 3 days. However, LDH and protein were not checked in the serum in or around the date of thoracentesis. If using total protein on admission, protein ratio is 0.52 which fulfills criteria for exudative. No growth from pleural effusion cultures from OSH.  - chest tube removed 2/26, f/u xr without evidence of pneumo    # pain, insomnia  - acetaminophen 500 mg q6h  - Morphine 7.5 mg q4h PRN available for mod-severe pain  - morphine 1-2mg q6h PRN for severe pain  - seroquel 25 mg QHS prn  - melatonin 3 mg Qevening scheduled    # resolved problems  1. JUAN, likely 2/2 intravascular volume depletion in setting of sepsis, with contribution from low albumin. Had JUAN from OSH. Abd US w/ascites and normal kidneys. Resolved 3/4/17    FEN: NJTFs per nutrition, full liquid diet, hope to start cycling tube feeds today  Prophylaxis:  DVT: SCDs, heparin subq    Lung: IS, OOB  GI: pantoprazole QDay  Code Status: FULL CODE  Disposition: pending further evaluation of pancreas and intervention as necessary    Patient seen and discussed with Dr. Rose Domingo MD, PhD  PGY-1, Internal Medicine  [id number]      Interval History     Not sleeping well. Feeling slightly better. Wants to go outside today if possible and enjoy the weather.     Vitals, Exam, Data     Physical exam:  Temp:  [97.3  F (36.3  C)-99.2  F (37.3  C)] 99  F (37.2  C)  Heart Rate:  [82-99] 95  Resp:  [20-24] 22  BP: (134-147)/(83-88) 140/84  SpO2:  [91 %-98 %] 93 % on RA    Wt Readings from Last 2 Encounters:   03/05/17 130 kg (286 lb 11.2 oz)         Intake/Output Summary (Last 24 hours) at 03/05/17 0706  Last data filed at 03/05/17 0603    Gross per 24 hour   Intake             2635 ml   Output             5150 ml   Net            -2515 ml     Physical Exam:   General: resting in bed  HEENT: mmm, NJ in place  Cardiac: Normal rate, regular rhythm. No m/r/g. Normal S1, S2.  Pulm: decreased at bases, otherwise clear throughout  Abd: Soft, diffusely tender, moderately distended.  Extremities: 1-2+ Bilateral LE edema, wraps in palce  Neuro: A&Ox3, no focal deficits    CBC    Recent Labs  Lab 03/05/17  0537 03/03/17  0157 03/02/17  0821 03/01/17  0947   WBC 12.1* 13.4* 16.5* 21.2*   HGB 8.5* 8.4* 8.9* 9.4*   MCV 82 83 83 83   * 475* 467* 477*       BMP    Recent Labs  Lab 03/05/17  1718 03/05/17  0537 03/04/17  1728 03/04/17  1251 03/04/17  0534  03/02/17  0821  02/28/17  0637    139 138  --  136  < > 143  < > 138   POTASSIUM 3.8 3.9 3.6 3.5 3.6  < > 3.6  < > 4.2   CHLORIDE 100 100 100  --  100  < > 105  < > 103   CO2 32 32 32  --  30  < > 28  < > 29   ANIONGAP 4 7 6  --  7  < > 11  < > 6   * 108* 107*  --  94  < > 106*  < > 120*   BUN 32* 31* 31*  --  29  < > 36*  < > 36*   CR 1.02 0.96 1.03  --  1.00  < > 1.13  < > 1.44*   YAN 7.6* 7.7* 7.8*  --  7.8*  < > 7.3*  < > 7.2*   MAG  --  2.0  --  1.7 1.6  --  1.7  < > 1.8   PHOS  --   --   --   --  3.5  --   --   --  3.0   < > = values in this interval not displayed.     INR    Recent Labs  Lab 03/03/17 0157   INR 1.14       Liver panel    Recent Labs  Lab 03/02/17  0821 03/01/17  0947 02/28/17  0637   PROTTOTAL  --  6.1* 5.8*   ALBUMIN 1.2* 1.3* 1.2*   BILITOTAL  --  0.3  --    ALKPHOS  --  87  --    AST  --  20 31   ALT  --  15 16     Imaging, CT abd w/contrast 3/1/17:  1. Necrotizing pancreatitis with acute pancreatic collections/walled  off necrosis in the body and the tail of the pancreas. There are  additional rim-enhancing walled off necrosis within the abdomen, at  least one new from prior, , none of these collections contain gas.  2. Moderate left pleural effusion with interval  removal of the left  basilar chest tube.  3. Groundglass opacities in the periphery of the right middle lobe  and lingula, concerning for infection versus aspiration.  4. Bibasilar atelectasis/consolidation.     Physician Attestation  I, Emilia Rose MD, saw this patient with the resident and agree with the resident s findings and plan of care as documented in the resident s note with my edits.     I personally reviewed vital signs, medications, labs and imaging.    Emilia Rose MD  Date of Service (when I saw the patient): 3/6/17

## 2017-03-06 NOTE — PLAN OF CARE
Problem: Goal Outcome Summary  Goal: Goal Outcome Summary  PT 7D- pt seen for exercise and gait. Pt performing LE exercises with improved technique and endurance. Pt walked 140 feet without AD with CGA of PT and did better than on Sunday. Pt still weak and with decreased balance but is showing gains. Pt will benefit from further PT at home or OP upon discharge.

## 2017-03-06 NOTE — PROGRESS NOTES
PANCREAS-BILIARY PROGRESS NOTE    ASSESSMENT:  46 year old male with past history depression, anxiety, tobacco abuse, past ETOH abuse, recent history significant for presumed gallstone pancreatitis (12/14/2016) s/p ERCP w sphincterotomy with biliary sludge (12/17/17), laparoscopic cholecystectomy (12/18/17), repeat ERCP with biliary stent (7 cm x 10 F ) for elevated LFT (1/12/17) transferred here from [facility] ([place], [place]) for further management of necrotizing pancreatitis with acute necrotic collections.   Also complicated by acute kidney injury, ascites and pleural effusions s/p chest tube (now removed).  On empiric meropenem since 2/14 (signout from [place] suggested infected ascites though we do not have those records).     3/1 Off merro ondeveloped drenching sweats, rising WBC thus restarted and CT w/IV contrast done. There is a maturing collection inferior to the stomach, partially loculated ascites, recurrent pleural effusion and immature LUQ necrotic collections and inflammation.   3/2 Ascites tap done with ~500 neutrophils and excessively high amylase/lipase c/w pancreatic ascites culture + for VRE.  3/3, Axios and solus placement in lesser sac collection. ERCP with sphincterotomy and stent removal also done.   3/3 WON culture with candida    RECOMMENDATIONS:    Appreciate ID assistance with VRE, candidal cultures now with ongoing meropenem, linezolid and micafungin use    Repeat Abd CT 3/7 with IV contrast    Necrosectomy 3/8 with Dr. Casanova. Please keep patient NPO at midnight the evening prior    If he worsens despite the above, consider IR perc drain into LUQ collection and/or thoracentesis to assess for a source    Continue tube feeds through NJ tube and pancreatic enzymes included.     Advance diet to mechanical soft and low fat    Holding anticoagulation with axios in place    The patient care plan was discussed with Dr. Lamb, Pancreaticobiliary staff physician.    Piter Wheeler MD  GI  Fellow  _______________________________________________________________  S: No major events over the weekend. Mood is down a bit but feeling less diaphoretic, less feverish. Not getting out of bed much. Abdominal pain slowly improving.     O:  Temp:  [97.3  F (36.3  C)-99.2  F (37.3  C)] 97.6  F (36.4  C)  Heart Rate:  [85-99] 88  Resp:  [20-24] 20  BP: (134-147)/(83-89) 137/83  SpO2:  [91 %-98 %] 95 %  GEN: lying in bed, no distress, pleasant, soft spoken  HEENT: no scleral icterus, NJ in place  Resp: breathing comfortably on room air  Abd: protuberant, slightly firm, minimally tender in the mid abdomen without rebound or guarding. Hypoactive but present bowel sounds  Ext: legs wrapped in compression bandaging  Neuro: aao x 3    LABS:  BMP    Recent Labs  Lab 03/06/17  0608 03/05/17  1718 03/05/17  0537 03/04/17  1728    137 139 138   POTASSIUM 3.9 3.8 3.9 3.6   CHLORIDE 102 100 100 100   YAN 7.7* 7.6* 7.7* 7.8*   CO2 30 32 32 32   BUN 31* 32* 31* 31*   CR 0.98 1.02 0.96 1.03   * 111* 108* 107*     CBC    Recent Labs  Lab 03/06/17  0608 03/05/17  0537 03/03/17  0157 03/02/17  0821   WBC 13.0* 12.1* 13.4* 16.5*   RBC 3.14* 3.27* 3.25* 3.44*   HGB 8.0* 8.5* 8.4* 8.9*   HCT 25.7* 26.8* 26.8* 28.5*   MCV 82 82 83 83   MCH 25.5* 26.0* 25.8* 25.9*   MCHC 31.1* 31.7 31.3* 31.2*   RDW 17.1* 17.1* 16.7* 16.8*   * 510* 475* 467*     INR    Recent Labs  Lab 03/03/17  0157   INR 1.14     LFTs    Recent Labs  Lab 03/02/17  0821 03/01/17  0947 02/28/17  0637   ALKPHOS  --  87  --    AST  --  20 31   ALT  --  15 16   BILITOTAL  --  0.3  --    PROTTOTAL  --  6.1* 5.8*   ALBUMIN 1.2* 1.3* 1.2*      PANC  No lab results found in last 7 days.

## 2017-03-06 NOTE — PLAN OF CARE
Problem: Pancreatitis, Acute/Chronic (Adult)  Goal: Signs and Symptoms of Listed Potential Problems Will be Absent or Manageable (Pancreatitis, Acute/Chronic)  Signs and symptoms of listed potential problems will be absent or manageable by discharge/transition of care (reference Pancreatitis, Acute/Chronic (Adult) CPG).   Outcome: Improving  Morphine 7.5 IR given for abdominal pain with relief. Up with standby assistance. Has a stafford catheter for strict intake and output. Denies nausea. Tube feeding infusing at 85 cc's an hour and will need to be increased by 10 cc's an hour at 1630. HGB 8.0, but doctors don't want to transfuse him. Please encourage activity.

## 2017-03-06 NOTE — PROGRESS NOTES
[facility] ID SERVICE: ONGOING CONSULTATION   Jayson Bella : 1970 Sex: male:   Medical record number [id number] Attending Physician: Emilia Rose MD  Date of Service: 2017    PROBLEM LIST:   1. Necrotizing pancreatitis with pseudocyst formation  2. Peritonitis with growth of VRE and Yeast; multiple abscesses identified by CT 3/1     RECOMMENDATIONS:   1. Continue with Linezolid for VRE in the peritoneal fluid  2. Continue with Micafungin for Candida albicans in the peritoneal fluid; may transition to Fluconazole 200mg IV/PO daily  3. Recommend continuing the meropenem as peritonitis is likely polymicrobial and he responded well to therapy  4. Monitor for clinical response; may benefit from re-imaging late this week or early next week     DISCUSSION:   46 year old male with PMH of gallstone pancreatitis s/p cholecystectomy on 2016, complicated by pseudocyst compressing bile duct s/p ERCP on 2017 who presents from OSH with sepsis 2/2 necrotizing pancreatitis on 2017, now with VRE/Candidal peritonitis and evidence of intra-abdominal abscesses. Despite the lack of directed therapy to VRE/Candida, he responded to empiric meropenem with decreased fever, WBC, CRP, and procalcitonin. The peritonitis was likely polymicrobial to begin with and he responded to meropenem. The presence of VRE/Candida represent the only organism that could survive the meropenem. Their importance is questionable since he improved prior to changing antimicrobial therapy. Given the presence of the pseudocyst and multiple abscesses, favor targeted treatment for these two organisms, but would also resume the meropenem. Duration of therapy will be driven by clinical parameters (already improving) and radiographic findings.  ID will continue to follow.     CHARLA Acevedo M.D.  [facility] ID Service Staff  [phone]    SUBJECTIVE: (Recommend ? 4 systems)   Feeling ok overall. Poor sleep last night; does admit to  "excessive naps. Abdominal pain stable. No breathing issues or cough. Sitting up in chair today.    ROS:(Recommend ? 2systems)   A five-point review of systems was obtained and was negative with the exception of that which is described above.  Allergies   Allergen Reactions     Penicillins Swelling and Rash       No current outpatient prescriptions on file.       CURRENT ANTI-INFECTIVES:   Linezolid (3/4-), Micafungin (3/4-), Meropenem (2/26-)  EXAMINATION: (Recommend ? 5 systems)   Vital Signs: /83 (BP Location: Left arm)  Pulse 87  Temp 97.6  F (36.4  C) (Oral)  Resp 20  Ht 1.905 m (6' 3\")  Wt 130 kg (286 lb 11.2 oz)  SpO2 95%  BMI 35.84 kg/m2   GENERAL: well-developed, well-nourished, in chair in no acute distress.   HEENT: Head is normocephalic, atraumatic   EYES: Eyes have anicteric sclerae without conjunctival injection   ENT: Oropharynx is moist without exudates or ulcers.   NECK: Supple. No Cervical lymphadenopathy  LUNGS: Clear to auscultation bilateral.   CARDIOVASCULAR: Regular rate and rhythm with no murmurs, gallops or rubs.  ABDOMEN: Rare bowel sounds, soft, diffuse tenderness with palpation. No appreciable hepatosplenomegaly  SKIN: No acute rashes. Line(s) are in place without any surrounding erythema or exudate. Bilateral edema; wrapped   NEUROLOGIC: Grossly nonfocal. Active x4 extremities  NEW DATA/RESULTS:   Culture Micro   Date Value Ref Range Status   03/03/2017 Culture negative monitoring continues  Final   03/03/2017 (A)  Final    Light growth Candida albicans / dubliniensis Candida albicans and Candida   dubliniensis are not routinely speciated  Susceptibility testing not routinely done  Susceptibility testing requested by Yared Tucker, on 3/5/2017 to routine panel   including micafungin     03/02/2017 (A)  Final    Heavy growth Enterococcus faecium (VRE)  Critical Value/Significant Value called to and read back by FRANSISCA BARRERA RN   (6B).  03.04.17 0011 S     03/02/2017 Culture " negative monitoring continues  Final   03/02/2017 Culture negative after 16 hours  Final       Recent Labs   Lab Test  03/06/17   0608  03/04/17   0534  03/01/17   0947  02/27/17   0626  02/25/17   2206   CRP  90.0*  70.0*  180.0*  150.0*  180.0*     Recent Labs   Lab Test  03/06/17   0608  03/05/17   0537  03/03/17   0157  03/02/17   0821  03/01/17   0947  02/28/17   0637   WBC  13.0*  12.1*  13.4*  16.5*  21.2*  16.3*     Recent Labs   Lab Test  03/06/17   0608  03/05/17   1718  03/05/17   0537  03/04/17   1728   CR  0.98  1.02  0.96  1.03   GFRESTIMATED  83  79  85  78       Hematology Studies  Recent Labs   Lab Test  03/06/17   0608  03/05/17   0537  03/03/17   0157  03/02/17   0821  03/01/17   0947  02/28/17   0637   WBC  13.0*  12.1*  13.4*  16.5*  21.2*  16.3*   ANEU  10.7*  10.0*   --    --    --    --    AEOS  0.2  0.2   --    --    --    --    HCT  25.7*  26.8*  26.8*  28.5*  30.3*  32.5*   PLT  527*  510*  475*  467*  477*  538*       Metabolic  Recent Labs   Lab Test  03/06/17   0608  03/05/17   1718  03/05/17   0537   NA  138  137  139   BUN  31*  32*  31*   CO2  30  32  32   CR  0.98  1.02  0.96   GFRESTIMATED  83  79  85       Hepatic Studies  Recent Labs   Lab Test  03/02/17   0821  03/01/17   0947  02/28/17   0637  02/25/17   2206   BILITOTAL   --   0.3   --   0.4   ALKPHOS   --   87   --   90   ALBUMIN  1.2*  1.3*  1.2*  1.4*   AST   --   20  31  30   ALT   --   15  16  25       ImmunologlobulinsNo lab results found.

## 2017-03-06 NOTE — PLAN OF CARE
Problem: Goal Outcome Summary  Goal: Goal Outcome Summary  Outcome: No Change  [id number]: Tmax 99.2, Resps tachypneic. Sat-ting 92% on 2LPM via NC. Dyspneic at rest and even more so on exertion. Triggered SIRS; LA was 0.6. LS diminished; encouraged IS use. MSIR given once for abdominal pain with partial relief. Denies nausea. TF at goal rate of 85ml/hr. Tolerating well. Pt feels very fatigued; states he isn't able to sleep more than a couple hours at a time. Greenberg patent with large output. Lymphedema wraps in place. Up with SBA, walker and gait belt. Cont to monitor and with POC.

## 2017-03-07 ENCOUNTER — ANESTHESIA EVENT (OUTPATIENT)
Dept: SURGERY | Facility: CLINIC | Age: 47
DRG: 871 | End: 2017-03-07

## 2017-03-07 ENCOUNTER — APPOINTMENT (OUTPATIENT)
Dept: GENERAL RADIOLOGY | Facility: CLINIC | Age: 47
DRG: 871 | End: 2017-03-07
Attending: INTERNAL MEDICINE

## 2017-03-07 ENCOUNTER — APPOINTMENT (OUTPATIENT)
Dept: CT IMAGING | Facility: CLINIC | Age: 47
DRG: 871 | End: 2017-03-07
Attending: INTERNAL MEDICINE

## 2017-03-07 ENCOUNTER — ANESTHESIA (OUTPATIENT)
Dept: SURGERY | Facility: CLINIC | Age: 47
DRG: 871 | End: 2017-03-07

## 2017-03-07 LAB
ABO + RH BLD: NORMAL
ABO + RH BLD: NORMAL
ANION GAP SERPL CALCULATED.3IONS-SCNC: 8 MMOL/L (ref 3–14)
BACTERIA SPEC CULT: ABNORMAL
BASOPHILS # BLD AUTO: 0.1 10E9/L (ref 0–0.2)
BASOPHILS NFR BLD AUTO: 0.5 %
BLD GP AB SCN SERPL QL: NORMAL
BLD PROD TYP BPU: NORMAL
BLD UNIT ID BPU: 0
BLD UNIT ID BPU: 0
BLOOD BANK CMNT PATIENT-IMP: NORMAL
BLOOD PRODUCT CODE: NORMAL
BLOOD PRODUCT CODE: NORMAL
BPU ID: NORMAL
BPU ID: NORMAL
BUN SERPL-MCNC: 33 MG/DL (ref 7–30)
CALCIUM SERPL-MCNC: 7.8 MG/DL (ref 8.5–10.1)
CHLORIDE SERPL-SCNC: 104 MMOL/L (ref 94–109)
CO2 SERPL-SCNC: 30 MMOL/L (ref 20–32)
CREAT SERPL-MCNC: 0.93 MG/DL (ref 0.66–1.25)
DIFFERENTIAL METHOD BLD: ABNORMAL
EOSINOPHIL # BLD AUTO: 0.3 10E9/L (ref 0–0.7)
EOSINOPHIL NFR BLD AUTO: 2.1 %
ERYTHROCYTE [DISTWIDTH] IN BLOOD BY AUTOMATED COUNT: 17.2 % (ref 10–15)
ERYTHROCYTE [DISTWIDTH] IN BLOOD BY AUTOMATED COUNT: 17.2 % (ref 10–15)
GFR SERPL CREATININE-BSD FRML MDRD: 88 ML/MIN/1.7M2
GLUCOSE SERPL-MCNC: 107 MG/DL (ref 70–99)
HCT VFR BLD AUTO: 23.9 % (ref 40–53)
HCT VFR BLD AUTO: 26.7 % (ref 40–53)
HGB BLD-MCNC: 7 G/DL (ref 13.3–17.7)
HGB BLD-MCNC: 7.3 G/DL (ref 13.3–17.7)
HGB BLD-MCNC: 7.5 G/DL (ref 13.3–17.7)
HGB BLD-MCNC: 8.4 G/DL (ref 13.3–17.7)
IMM GRANULOCYTES # BLD: 0.1 10E9/L (ref 0–0.4)
IMM GRANULOCYTES NFR BLD: 0.5 %
LACTATE BLD-SCNC: 1 MMOL/L (ref 0.7–2.1)
LYMPHOCYTES # BLD AUTO: 1 10E9/L (ref 0.8–5.3)
LYMPHOCYTES NFR BLD AUTO: 7 %
MAGNESIUM SERPL-MCNC: 2.1 MG/DL (ref 1.6–2.3)
MCH RBC QN AUTO: 25.8 PG (ref 26.5–33)
MCH RBC QN AUTO: 25.8 PG (ref 26.5–33)
MCHC RBC AUTO-ENTMCNC: 31.4 G/DL (ref 31.5–36.5)
MCHC RBC AUTO-ENTMCNC: 31.5 G/DL (ref 31.5–36.5)
MCV RBC AUTO: 82 FL (ref 78–100)
MCV RBC AUTO: 82 FL (ref 78–100)
MICRO REPORT STATUS: ABNORMAL
MICROORGANISM SPEC CULT: ABNORMAL
MONOCYTES # BLD AUTO: 0.6 10E9/L (ref 0–1.3)
MONOCYTES NFR BLD AUTO: 4.2 %
NEUTROPHILS # BLD AUTO: 12.7 10E9/L (ref 1.6–8.3)
NEUTROPHILS NFR BLD AUTO: 85.7 %
NRBC # BLD AUTO: 0 10*3/UL
NRBC BLD AUTO-RTO: 0 /100
NUM BPU REQUESTED: 2
PLATELET # BLD AUTO: 526 10E9/L (ref 150–450)
PLATELET # BLD AUTO: 655 10E9/L (ref 150–450)
POTASSIUM SERPL-SCNC: 4.2 MMOL/L (ref 3.4–5.3)
RBC # BLD AUTO: 2.91 10E12/L (ref 4.4–5.9)
RBC # BLD AUTO: 3.25 10E12/L (ref 4.4–5.9)
SODIUM SERPL-SCNC: 141 MMOL/L (ref 133–144)
SPECIMEN EXP DATE BLD: NORMAL
SPECIMEN SOURCE: ABNORMAL
TRANSFUSION STATUS PATIENT QL: NORMAL
WBC # BLD AUTO: 12.4 10E9/L (ref 4–11)
WBC # BLD AUTO: 14.9 10E9/L (ref 4–11)

## 2017-03-07 PROCEDURE — 99233 SBSQ HOSP IP/OBS HIGH 50: CPT | Mod: GC | Performed by: INTERNAL MEDICINE

## 2017-03-07 PROCEDURE — 85025 COMPLETE CBC W/AUTO DIFF WBC: CPT | Performed by: STUDENT IN AN ORGANIZED HEALTH CARE EDUCATION/TRAINING PROGRAM

## 2017-03-07 PROCEDURE — 25000132 ZZH RX MED GY IP 250 OP 250 PS 637: Performed by: STUDENT IN AN ORGANIZED HEALTH CARE EDUCATION/TRAINING PROGRAM

## 2017-03-07 PROCEDURE — 93005 ELECTROCARDIOGRAM TRACING: CPT

## 2017-03-07 PROCEDURE — C1769 GUIDE WIRE: HCPCS | Performed by: INTERNAL MEDICINE

## 2017-03-07 PROCEDURE — 25000128 H RX IP 250 OP 636: Performed by: NURSE ANESTHETIST, CERTIFIED REGISTERED

## 2017-03-07 PROCEDURE — 74177 CT ABD & PELVIS W/CONTRAST: CPT

## 2017-03-07 PROCEDURE — 37000008 ZZH ANESTHESIA TECHNICAL FEE, 1ST 30 MIN: Performed by: INTERNAL MEDICINE

## 2017-03-07 PROCEDURE — 93010 ELECTROCARDIOGRAM REPORT: CPT | Performed by: INTERNAL MEDICINE

## 2017-03-07 PROCEDURE — P9016 RBC LEUKOCYTES REDUCED: HCPCS | Performed by: ANESTHESIOLOGY

## 2017-03-07 PROCEDURE — 80048 BASIC METABOLIC PNL TOTAL CA: CPT | Performed by: STUDENT IN AN ORGANIZED HEALTH CARE EDUCATION/TRAINING PROGRAM

## 2017-03-07 PROCEDURE — 36000059 ZZH SURGERY LEVEL 3 EA 15 ADDTL MIN UMMC: Performed by: INTERNAL MEDICINE

## 2017-03-07 PROCEDURE — C1876 STENT, NON-COA/NON-COV W/DEL: HCPCS | Performed by: INTERNAL MEDICINE

## 2017-03-07 PROCEDURE — P9041 ALBUMIN (HUMAN),5%, 50ML: HCPCS | Performed by: NURSE ANESTHETIST, CERTIFIED REGISTERED

## 2017-03-07 PROCEDURE — 25000128 H RX IP 250 OP 636: Performed by: STUDENT IN AN ORGANIZED HEALTH CARE EDUCATION/TRAINING PROGRAM

## 2017-03-07 PROCEDURE — 27210794 ZZH OR GENERAL SUPPLY STERILE: Performed by: INTERNAL MEDICINE

## 2017-03-07 PROCEDURE — 25000128 H RX IP 250 OP 636: Performed by: INTERNAL MEDICINE

## 2017-03-07 PROCEDURE — 25000125 ZZHC RX 250: Performed by: INTERNAL MEDICINE

## 2017-03-07 PROCEDURE — 25000125 ZZHC RX 250: Performed by: STUDENT IN AN ORGANIZED HEALTH CARE EDUCATION/TRAINING PROGRAM

## 2017-03-07 PROCEDURE — 0FPB8DZ REMOVAL OF INTRALUMINAL DEVICE FROM HEPATOBILIARY DUCT, VIA NATURAL OR ARTIFICIAL OPENING ENDOSCOPIC: ICD-10-PCS | Performed by: INTERNAL MEDICINE

## 2017-03-07 PROCEDURE — 83735 ASSAY OF MAGNESIUM: CPT | Performed by: STUDENT IN AN ORGANIZED HEALTH CARE EDUCATION/TRAINING PROGRAM

## 2017-03-07 PROCEDURE — 0D9680Z DRAINAGE OF STOMACH WITH DRAINAGE DEVICE, VIA NATURAL OR ARTIFICIAL OPENING ENDOSCOPIC: ICD-10-PCS | Performed by: INTERNAL MEDICINE

## 2017-03-07 PROCEDURE — 37000009 ZZH ANESTHESIA TECHNICAL FEE, EACH ADDTL 15 MIN: Performed by: INTERNAL MEDICINE

## 2017-03-07 PROCEDURE — 71000016 ZZH RECOVERY PHASE 1 LEVEL 3 FIRST HR: Performed by: INTERNAL MEDICINE

## 2017-03-07 PROCEDURE — 86850 RBC ANTIBODY SCREEN: CPT | Performed by: ANESTHESIOLOGY

## 2017-03-07 PROCEDURE — 85018 HEMOGLOBIN: CPT | Performed by: STUDENT IN AN ORGANIZED HEALTH CARE EDUCATION/TRAINING PROGRAM

## 2017-03-07 PROCEDURE — 40000556 ZZH STATISTIC PERIPHERAL IV START W US GUIDANCE

## 2017-03-07 PROCEDURE — 25000566 ZZH SEVOFLURANE, EA 15 MIN: Performed by: INTERNAL MEDICINE

## 2017-03-07 PROCEDURE — 25000132 ZZH RX MED GY IP 250 OP 250 PS 637: Performed by: INTERNAL MEDICINE

## 2017-03-07 PROCEDURE — 86901 BLOOD TYPING SEROLOGIC RH(D): CPT | Performed by: ANESTHESIOLOGY

## 2017-03-07 PROCEDURE — 25500064 ZZH RX 255 OP 636: Performed by: STUDENT IN AN ORGANIZED HEALTH CARE EDUCATION/TRAINING PROGRAM

## 2017-03-07 PROCEDURE — 36415 COLL VENOUS BLD VENIPUNCTURE: CPT | Performed by: STUDENT IN AN ORGANIZED HEALTH CARE EDUCATION/TRAINING PROGRAM

## 2017-03-07 PROCEDURE — 25000125 ZZHC RX 250: Performed by: NURSE ANESTHETIST, CERTIFIED REGISTERED

## 2017-03-07 PROCEDURE — 71000017 ZZH RECOVERY PHASE 1 LEVEL 3 EA ADDTL HR: Performed by: INTERNAL MEDICINE

## 2017-03-07 PROCEDURE — 86900 BLOOD TYPING SEROLOGIC ABO: CPT | Performed by: ANESTHESIOLOGY

## 2017-03-07 PROCEDURE — 40000280 XR SURGERY CARM FLUORO GREATER THAN 5 MIN: Mod: TC

## 2017-03-07 PROCEDURE — 36000057 ZZH SURGERY LEVEL 3 1ST 30 MIN - UMMC: Performed by: INTERNAL MEDICINE

## 2017-03-07 PROCEDURE — 83605 ASSAY OF LACTIC ACID: CPT | Performed by: INTERNAL MEDICINE

## 2017-03-07 PROCEDURE — 85027 COMPLETE CBC AUTOMATED: CPT | Performed by: STUDENT IN AN ORGANIZED HEALTH CARE EDUCATION/TRAINING PROGRAM

## 2017-03-07 PROCEDURE — 25500064 ZZH RX 255 OP 636

## 2017-03-07 PROCEDURE — 36415 COLL VENOUS BLD VENIPUNCTURE: CPT | Performed by: INTERNAL MEDICINE

## 2017-03-07 PROCEDURE — 27210436 ZZH NUTRITION PRODUCT SEMIELEM INTERMED CAN

## 2017-03-07 PROCEDURE — S0164 INJECTION PANTROPRAZOLE: HCPCS | Performed by: STUDENT IN AN ORGANIZED HEALTH CARE EDUCATION/TRAINING PROGRAM

## 2017-03-07 PROCEDURE — C9399 UNCLASSIFIED DRUGS OR BIOLOG: HCPCS | Performed by: NURSE ANESTHETIST, CERTIFIED REGISTERED

## 2017-03-07 PROCEDURE — 12000001 ZZH R&B MED SURG/OB UMMC

## 2017-03-07 PROCEDURE — 40000171 ZZH STATISTIC PRE-PROCEDURE ASSESSMENT III: Performed by: INTERNAL MEDICINE

## 2017-03-07 PROCEDURE — 86923 COMPATIBILITY TEST ELECTRIC: CPT | Performed by: ANESTHESIOLOGY

## 2017-03-07 DEVICE — STENT SOLUS BILIARY 10FRX03CM DBL PIGTAIL W/INTRO G25670
Type: IMPLANTABLE DEVICE | Site: BILE DUCT | Status: NON-FUNCTIONAL
Removed: 2017-03-23

## 2017-03-07 RX ORDER — LIDOCAINE HYDROCHLORIDE 20 MG/ML
INJECTION, SOLUTION INFILTRATION; PERINEURAL PRN
Status: DISCONTINUED | OUTPATIENT
Start: 2017-03-07 | End: 2017-03-07

## 2017-03-07 RX ORDER — MEPERIDINE HYDROCHLORIDE 25 MG/ML
12.5 INJECTION INTRAMUSCULAR; INTRAVENOUS; SUBCUTANEOUS EVERY 5 MIN PRN
Status: DISCONTINUED | OUTPATIENT
Start: 2017-03-07 | End: 2017-03-07 | Stop reason: HOSPADM

## 2017-03-07 RX ORDER — SODIUM CHLORIDE, SODIUM LACTATE, POTASSIUM CHLORIDE, CALCIUM CHLORIDE 600; 310; 30; 20 MG/100ML; MG/100ML; MG/100ML; MG/100ML
INJECTION, SOLUTION INTRAVENOUS CONTINUOUS
Status: DISCONTINUED | OUTPATIENT
Start: 2017-03-07 | End: 2017-03-07 | Stop reason: HOSPADM

## 2017-03-07 RX ORDER — ONDANSETRON 2 MG/ML
INJECTION INTRAMUSCULAR; INTRAVENOUS PRN
Status: DISCONTINUED | OUTPATIENT
Start: 2017-03-07 | End: 2017-03-07

## 2017-03-07 RX ORDER — SODIUM CHLORIDE 9 MG/ML
INJECTION, SOLUTION INTRAVENOUS CONTINUOUS
Status: DISCONTINUED | OUTPATIENT
Start: 2017-03-07 | End: 2017-03-08

## 2017-03-07 RX ORDER — NALOXONE HYDROCHLORIDE 0.4 MG/ML
.1-.4 INJECTION, SOLUTION INTRAMUSCULAR; INTRAVENOUS; SUBCUTANEOUS
Status: ACTIVE | OUTPATIENT
Start: 2017-03-07 | End: 2017-03-08

## 2017-03-07 RX ORDER — ONDANSETRON 2 MG/ML
4 INJECTION INTRAMUSCULAR; INTRAVENOUS EVERY 30 MIN PRN
Status: DISCONTINUED | OUTPATIENT
Start: 2017-03-07 | End: 2017-03-07 | Stop reason: HOSPADM

## 2017-03-07 RX ORDER — PROPOFOL 10 MG/ML
INJECTION, EMULSION INTRAVENOUS PRN
Status: DISCONTINUED | OUTPATIENT
Start: 2017-03-07 | End: 2017-03-07

## 2017-03-07 RX ORDER — EPHEDRINE SULFATE 50 MG/ML
INJECTION, SOLUTION INTRAMUSCULAR; INTRAVENOUS; SUBCUTANEOUS PRN
Status: DISCONTINUED | OUTPATIENT
Start: 2017-03-07 | End: 2017-03-07

## 2017-03-07 RX ORDER — ACETAMINOPHEN 500 MG
1000 TABLET ORAL EVERY 6 HOURS PRN
Status: DISCONTINUED | OUTPATIENT
Start: 2017-03-07 | End: 2017-03-18 | Stop reason: HOSPADM

## 2017-03-07 RX ORDER — KETOROLAC TROMETHAMINE 30 MG/ML
30 INJECTION, SOLUTION INTRAMUSCULAR; INTRAVENOUS
Status: DISCONTINUED | OUTPATIENT
Start: 2017-03-07 | End: 2017-03-07 | Stop reason: HOSPADM

## 2017-03-07 RX ORDER — GLYCOPYRROLATE 0.2 MG/ML
INJECTION, SOLUTION INTRAMUSCULAR; INTRAVENOUS PRN
Status: DISCONTINUED | OUTPATIENT
Start: 2017-03-07 | End: 2017-03-07

## 2017-03-07 RX ORDER — ONDANSETRON 4 MG/1
4 TABLET, ORALLY DISINTEGRATING ORAL EVERY 30 MIN PRN
Status: DISCONTINUED | OUTPATIENT
Start: 2017-03-07 | End: 2017-03-07 | Stop reason: HOSPADM

## 2017-03-07 RX ORDER — SODIUM CHLORIDE 9 MG/ML
INJECTION, SOLUTION INTRAVENOUS CONTINUOUS PRN
Status: DISCONTINUED | OUTPATIENT
Start: 2017-03-07 | End: 2017-03-07

## 2017-03-07 RX ORDER — FENTANYL CITRATE 50 UG/ML
25-50 INJECTION, SOLUTION INTRAMUSCULAR; INTRAVENOUS
Status: DISCONTINUED | OUTPATIENT
Start: 2017-03-07 | End: 2017-03-07 | Stop reason: HOSPADM

## 2017-03-07 RX ORDER — FENTANYL CITRATE 50 UG/ML
INJECTION, SOLUTION INTRAMUSCULAR; INTRAVENOUS PRN
Status: DISCONTINUED | OUTPATIENT
Start: 2017-03-07 | End: 2017-03-07

## 2017-03-07 RX ORDER — NEOSTIGMINE METHYLSULFATE 1 MG/ML
VIAL (ML) INJECTION PRN
Status: DISCONTINUED | OUTPATIENT
Start: 2017-03-07 | End: 2017-03-07

## 2017-03-07 RX ORDER — ALBUMIN, HUMAN INJ 5% 5 %
SOLUTION INTRAVENOUS CONTINUOUS PRN
Status: DISCONTINUED | OUTPATIENT
Start: 2017-03-07 | End: 2017-03-07

## 2017-03-07 RX ORDER — HYDROMORPHONE HYDROCHLORIDE 1 MG/ML
.3-.5 INJECTION, SOLUTION INTRAMUSCULAR; INTRAVENOUS; SUBCUTANEOUS EVERY 5 MIN PRN
Status: DISCONTINUED | OUTPATIENT
Start: 2017-03-07 | End: 2017-03-07 | Stop reason: HOSPADM

## 2017-03-07 RX ORDER — FLUMAZENIL 0.1 MG/ML
0.2 INJECTION, SOLUTION INTRAVENOUS
Status: ACTIVE | OUTPATIENT
Start: 2017-03-07 | End: 2017-03-08

## 2017-03-07 RX ORDER — IOPAMIDOL 755 MG/ML
135 INJECTION, SOLUTION INTRAVASCULAR ONCE
Status: COMPLETED | OUTPATIENT
Start: 2017-03-07 | End: 2017-03-07

## 2017-03-07 RX ORDER — LIDOCAINE 40 MG/G
CREAM TOPICAL
Status: DISCONTINUED | OUTPATIENT
Start: 2017-03-07 | End: 2017-03-07 | Stop reason: HOSPADM

## 2017-03-07 RX ADMIN — ROCURONIUM BROMIDE 20 MG: 10 INJECTION INTRAVENOUS at 13:24

## 2017-03-07 RX ADMIN — PHENYLEPHRINE HYDROCHLORIDE 200 MCG: 10 INJECTION, SOLUTION INTRAMUSCULAR; INTRAVENOUS; SUBCUTANEOUS at 13:22

## 2017-03-07 RX ADMIN — ALBUMIN (HUMAN): 12.5 SOLUTION INTRAVENOUS at 13:22

## 2017-03-07 RX ADMIN — PANTOPRAZOLE SODIUM 40 MG: 40 INJECTION, POWDER, FOR SOLUTION INTRAVENOUS at 08:18

## 2017-03-07 RX ADMIN — MIDAZOLAM HYDROCHLORIDE 1 MG: 1 INJECTION, SOLUTION INTRAMUSCULAR; INTRAVENOUS at 12:34

## 2017-03-07 RX ADMIN — ACETAMINOPHEN 500 MG: 500 TABLET, FILM COATED ORAL at 00:44

## 2017-03-07 RX ADMIN — LINEZOLID 600 MG: 600 INJECTION, SOLUTION INTRAVENOUS at 03:33

## 2017-03-07 RX ADMIN — PHENYLEPHRINE HYDROCHLORIDE 100 MCG: 10 INJECTION, SOLUTION INTRAMUSCULAR; INTRAVENOUS; SUBCUTANEOUS at 13:06

## 2017-03-07 RX ADMIN — ONDANSETRON 4 MG: 2 INJECTION INTRAMUSCULAR; INTRAVENOUS at 14:03

## 2017-03-07 RX ADMIN — MIDAZOLAM HYDROCHLORIDE 1 MG: 1 INJECTION, SOLUTION INTRAMUSCULAR; INTRAVENOUS at 12:31

## 2017-03-07 RX ADMIN — IOPAMIDOL 135 ML: 755 INJECTION, SOLUTION INTRAVENOUS at 09:30

## 2017-03-07 RX ADMIN — Medication 7.5 MG: at 03:32

## 2017-03-07 RX ADMIN — PHENYLEPHRINE HYDROCHLORIDE 100 MCG: 10 INJECTION, SOLUTION INTRAMUSCULAR; INTRAVENOUS; SUBCUTANEOUS at 13:55

## 2017-03-07 RX ADMIN — PHENYLEPHRINE HYDROCHLORIDE 100 MCG: 10 INJECTION, SOLUTION INTRAMUSCULAR; INTRAVENOUS; SUBCUTANEOUS at 13:03

## 2017-03-07 RX ADMIN — SODIUM CHLORIDE: 9 INJECTION, SOLUTION INTRAVENOUS at 17:44

## 2017-03-07 RX ADMIN — MEROPENEM 1 G: 1 INJECTION, POWDER, FOR SOLUTION INTRAVENOUS at 08:18

## 2017-03-07 RX ADMIN — PHENYLEPHRINE HYDROCHLORIDE 200 MCG: 10 INJECTION, SOLUTION INTRAMUSCULAR; INTRAVENOUS; SUBCUTANEOUS at 13:16

## 2017-03-07 RX ADMIN — PHENYLEPHRINE HYDROCHLORIDE 200 MCG: 10 INJECTION, SOLUTION INTRAMUSCULAR; INTRAVENOUS; SUBCUTANEOUS at 13:29

## 2017-03-07 RX ADMIN — Medication 5 MG: at 14:16

## 2017-03-07 RX ADMIN — MEROPENEM 1 G: 1 INJECTION, POWDER, FOR SOLUTION INTRAVENOUS at 00:52

## 2017-03-07 RX ADMIN — GLYCOPYRROLATE 1 MG: 0.2 INJECTION, SOLUTION INTRAMUSCULAR; INTRAVENOUS at 14:16

## 2017-03-07 RX ADMIN — SODIUM CHLORIDE, PRESERVATIVE FREE 84 ML: 5 INJECTION INTRAVENOUS at 09:31

## 2017-03-07 RX ADMIN — SODIUM CHLORIDE 8 MG/HR: 9 INJECTION, SOLUTION INTRAVENOUS at 21:32

## 2017-03-07 RX ADMIN — VASOPRESSIN 1 UNITS: 20 INJECTION, SOLUTION INTRAMUSCULAR; SUBCUTANEOUS at 13:49

## 2017-03-07 RX ADMIN — ACETAMINOPHEN 500 MG: 500 TABLET, FILM COATED ORAL at 08:11

## 2017-03-07 RX ADMIN — ROCURONIUM BROMIDE 10 MG: 10 INJECTION INTRAVENOUS at 13:46

## 2017-03-07 RX ADMIN — PROPOFOL 30 MG: 10 INJECTION, EMULSION INTRAVENOUS at 13:11

## 2017-03-07 RX ADMIN — MICAFUNGIN SODIUM 100 MG: 10 INJECTION, POWDER, LYOPHILIZED, FOR SOLUTION INTRAVENOUS at 17:39

## 2017-03-07 RX ADMIN — Medication 5 MG: at 13:03

## 2017-03-07 RX ADMIN — LIDOCAINE HYDROCHLORIDE 100 MG: 20 INJECTION, SOLUTION INFILTRATION; PERINEURAL at 12:43

## 2017-03-07 RX ADMIN — SODIUM CHLORIDE: 9 INJECTION, SOLUTION INTRAVENOUS at 12:31

## 2017-03-07 RX ADMIN — SODIUM CHLORIDE: 9 INJECTION, SOLUTION INTRAVENOUS at 10:20

## 2017-03-07 RX ADMIN — FENTANYL CITRATE 50 MCG: 50 INJECTION, SOLUTION INTRAMUSCULAR; INTRAVENOUS at 13:12

## 2017-03-07 RX ADMIN — VASOPRESSIN 1 UNITS: 20 INJECTION, SOLUTION INTRAMUSCULAR; SUBCUTANEOUS at 13:30

## 2017-03-07 RX ADMIN — SODIUM BICARBONATE 325 MG: 325 TABLET ORAL at 00:45

## 2017-03-07 RX ADMIN — PANCRELIPASE 72000 UNITS: 24000; 76000; 120000 CAPSULE, DELAYED RELEASE PELLETS ORAL at 00:45

## 2017-03-07 RX ADMIN — SODIUM BICARBONATE 325 MG: 325 TABLET ORAL at 21:12

## 2017-03-07 RX ADMIN — Medication 2 SPRAY: at 08:11

## 2017-03-07 RX ADMIN — FUROSEMIDE 40 MG: 20 TABLET ORAL at 10:28

## 2017-03-07 RX ADMIN — PHENYLEPHRINE HYDROCHLORIDE 100 MCG: 10 INJECTION, SOLUTION INTRAMUSCULAR; INTRAVENOUS; SUBCUTANEOUS at 13:01

## 2017-03-07 RX ADMIN — FENTANYL CITRATE 50 MCG: 50 INJECTION, SOLUTION INTRAMUSCULAR; INTRAVENOUS at 14:44

## 2017-03-07 RX ADMIN — SUGAMMADEX 200 MG: 100 INJECTION, SOLUTION INTRAVENOUS at 14:29

## 2017-03-07 RX ADMIN — VASOPRESSIN 1 UNITS: 20 INJECTION, SOLUTION INTRAMUSCULAR; SUBCUTANEOUS at 13:38

## 2017-03-07 RX ADMIN — SUCCINYLCHOLINE CHLORIDE 200 MG: 20 INJECTION, SOLUTION INTRAMUSCULAR; INTRAVENOUS at 12:43

## 2017-03-07 RX ADMIN — PHENYLEPHRINE HYDROCHLORIDE 100 MCG: 10 INJECTION, SOLUTION INTRAMUSCULAR; INTRAVENOUS; SUBCUTANEOUS at 13:19

## 2017-03-07 RX ADMIN — MORPHINE SULFATE 1 MG: 2 INJECTION, SOLUTION INTRAMUSCULAR; INTRAVENOUS at 04:14

## 2017-03-07 RX ADMIN — ROCURONIUM BROMIDE 20 MG: 10 INJECTION INTRAVENOUS at 13:17

## 2017-03-07 RX ADMIN — PHENYLEPHRINE HYDROCHLORIDE 100 MCG: 10 INJECTION, SOLUTION INTRAMUSCULAR; INTRAVENOUS; SUBCUTANEOUS at 13:40

## 2017-03-07 RX ADMIN — NICOTINE 1 PATCH: 14 PATCH, EXTENDED RELEASE TRANSDERMAL at 08:14

## 2017-03-07 RX ADMIN — ACETAMINOPHEN 1000 MG: 325 TABLET, FILM COATED ORAL at 17:50

## 2017-03-07 RX ADMIN — PANCRELIPASE 72000 UNITS: 24000; 76000; 120000 CAPSULE, DELAYED RELEASE PELLETS ORAL at 21:13

## 2017-03-07 RX ADMIN — SODIUM CHLORIDE 8 MG/HR: 9 INJECTION, SOLUTION INTRAVENOUS at 10:25

## 2017-03-07 RX ADMIN — ACETAMINOPHEN 1000 MG: 325 TABLET, FILM COATED ORAL at 23:39

## 2017-03-07 RX ADMIN — PROPOFOL 150 MG: 10 INJECTION, EMULSION INTRAVENOUS at 12:43

## 2017-03-07 ASSESSMENT — PAIN DESCRIPTION - DESCRIPTORS
DESCRIPTORS: ACHING
DESCRIPTORS: DISCOMFORT

## 2017-03-07 NOTE — BRIEF OP NOTE
[facility], [place]    Brief Operative Note    Pre-operative diagnosis: Necrotizing Pancreatitis   Post-operative diagnosis Same with GI bleed from Cystgastrostomy tract  Procedure: Procedure(s):  Esophagoduodenogastroscopy, Necrosectomy, Removal of Axios Stnet, placement of doule stnetns, placement of NJtube - Wound Class: II-Clean Contaminated  Surgeon: Surgeon(s) and Role:     * Pawel Lamb MD - Primary  Anesthesia: General   Estimated blood loss: 25 cc  Drains: None  Specimens: * No specimens in log *  Findings:   Clotted blood from inside AXIOS stent. Removed, endoscopy into cavity, clot on deep into wall at bottom limit of AXIOS. Irrigated, clot cleared, no definite vessel. Debrided cavity to limited extent, moderate amoount of solid necrosis. Two double pigtail stents placed. No bleeding or clot at end after careful examination and lavage of cavity  Nasal endoscopy to jejunum, melena in there. Wire placed and 12F NJ tube to beyond ligament of Treitz under flouro..   Complications: None.  Implants: None.  REC: start tube feedings ASAP. IV PPI. Watch for further bleeding  NOTE: PATIE NT HYPTOTENSIVE AT START OF CASE; HGB 7 THEREFORE GIVEN ONE UNIT PRBC INTRAOP. CONSIDER MORE TRANSFUSION DEPENDING ON HGB. CLINICAL BLEEDING< ETC

## 2017-03-07 NOTE — OR NURSING
"\" Would you please place preop orders on PIEDAD GRISSOM. thanks kenzie [phone]\" This message text paged to MD Lamb @ [phone].   "

## 2017-03-07 NOTE — ANESTHESIA POSTPROCEDURE EVALUATION
Patient: Jayson Bella    Procedure(s):  Esophagoduodenogastroscopy, Necrosectomy, Removal of Axios Stnet, placement of doule stnetns, placement of NJtube - Wound Class: II-Clean Contaminated    Diagnosis:Necrotizing Pancreatitis   Diagnosis Additional Information: No value filed.    Anesthesia Type:  General, ETT    Note:  Anesthesia Post Evaluation    Patient location during evaluation: PACU  Patient participation: Able to fully participate in evaluation  Level of consciousness: awake and alert  Pain management: adequate  Airway patency: patent  Cardiovascular status: acceptable  Respiratory status: acceptable  Hydration status: acceptable  PONV: controlled     Anesthetic complications: None          Last vitals:  Vitals:    03/07/17 0441 03/07/17 0850 03/07/17 1129   BP: 132/84 140/86    Pulse:      Resp: 20 20 18   Temp: 36.8  C (98.2  F) 36.6  C (97.9  F)    SpO2: 93% 96%          Electronically Signed By: Tony Sanders MD  March 7, 2017  2:42 PM

## 2017-03-07 NOTE — PLAN OF CARE
Problem: Pancreatitis, Acute/Chronic (Adult)  Goal: Signs and Symptoms of Listed Potential Problems Will be Absent or Manageable (Pancreatitis, Acute/Chronic)  Signs and symptoms of listed potential problems will be absent or manageable by discharge/transition of care (reference Pancreatitis, Acute/Chronic (Adult) CPG).   Outcome: No Change  Went to O.R. For endoscopy and possible stint placement. Was NPO so you will need to check with medicine team is tube feeding is to be restarted. Tylenol times one for abdominal discomort with some relief. Jayson prefers not to use narcotics unless he really needs them. Protontix drip started.

## 2017-03-07 NOTE — PLAN OF CARE
Problem: Goal Outcome Summary  Goal: Goal Outcome Summary  PT 7D- cx PT today. Pt to CT in am and then getting ready for OR in pm when PT arrived.

## 2017-03-07 NOTE — ANESTHESIA CARE TRANSFER NOTE
Patient: Jayson Bella    Procedure(s):  Esophagoduodenogastroscopy, Necrosectomy, Removal of Axios Stnet, placement of doule stnetns, placement of NJtube - Wound Class: II-Clean Contaminated    Diagnosis: Necrotizing Pancreatitis   Diagnosis Additional Information: No value filed.    Anesthesia Type:   General, ETT     Note:      Comments: Pt transported to PACU.  Maintaining airway and oxygenation via simple face mask.  VSS.  Fentanyl given for pain w/ desired effect.  Report to RN.  All questions answered.       Vitals: (Last set prior to Anesthesia Care Transfer)    CRNA VITALS  3/7/2017 1404 - 3/7/2017 1451      3/7/2017             Pulse: 104    SpO2: 92 %    Resp Rate (set): 10                Electronically Signed By: NANDINI Sauceda CRNA  March 7, 2017  2:51 PM

## 2017-03-07 NOTE — PLAN OF CARE
Problem: Goal Outcome Summary  Goal: Goal Outcome Summary  Outcome: No Change     VSS, afebrile.  Denies nausea. Pain managed with 7.5 mg morphine x2.  Tube feeds running at 105 cc/hr, next increase to 115/hr (goal rate) at 0030; TFs shut off from 8 am to 4 pm.  Potassium replaced for 3.9, Mg replaced for 2.0, both recheck with AM labs. IV antibiotics given as ordered.  Pt walked in halls x2 this shift, up in recliner for a while too. Up with SBA. Taking pills with water. Greenberg patent with good UOP.  Lymphedema wraps on but becoming loose. Seroquel and melatonin given at HS. Will continue to monitor and with POC.

## 2017-03-07 NOTE — PLAN OF CARE
Problem: Goal Outcome Summary  Goal: Goal Outcome Summary  OT/7D - Pt not available upon attempt due to OOR CT. Will check back and/or reschedule as appropriate.

## 2017-03-07 NOTE — PROGRESS NOTES
[facility]     Medicine Progress Note  Jayson Bella MRN: [id number]  1970  Date of Admission:2/25/2017  Date of Service: 03/07/2017  ___________________________________    Main Plans for Today   - PPI drip  - EGD today per GI  - Frequent hemoglobin checks  - NPO   - IVF     Assessment & Plan    Jayson Bella is a 46 year old White male with a past medical history significant for gallstone pancreatitis s/p cholecystectomy in 12/2016 c/b biliary obstruction d/t large pseudocyst s/p ERCP with stent placement 1/17 who was transferred from OSH with sepsis, fever, leukocytosis concerning for necrotizing pancreatitis and abscess formation.    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 Plan to go to OR today for EGD and possible necrosectomy.  - GI consult  - EGD today  - Recheck Hgb  - Follow up on GI recs     # Hypervolemia- improved.  Ascites likely secondary to IVF in setting of sepsis and hypoalbuminemia  - recent weight of 106 kg on 2/13  - strict I&O, stafford due to urinary retention. Start tamsolusin before d/c'ing stafford  - Daily weights  - Continue furosemide 40 mg PO daily  - K/M repletion protocols with goal 4/2     # Malnutrition  Currently NPO d/t GI bleed.  - Nutrition consult. Restart tube feeds once more stable.  - Creon     # L sided exudative pleural effusion - chest tube removed 2/26. Resolved.  No growth from pleural effusion cultures from OSH.  - chest tube removed 2/26, f/u xr without evidence of pneumo     # Pain, insomnia  -  acetaminophen 500 mg q6h  - Morphine 7.5 mg q4h PRN available for mod-severe pain  - morphine 1-2mg q6h PRN for severe pain  - seroquel 25 mg QHS prn  - melatonin 3 mg Qevening scheduled    ------------------------------------------------------------------------------------------------------------------  Prophylaxis:     -GI: PPI drip.    -DVT: SCDs, subq heaprin.    FEN: NPO currently, will restart tube feeds when possible. MIVF.  Code status: Full  Disposition: Pending clinical stability and improvement  =========================================================  Patient was discussed and evaluated with Dr. Yovany Rivera MD  Internal Medicine Resident, PGY 1  Pager [phone]    Interval History   Nursing notes reviewed. Patient reports that he felt terrible when he vomited blood earlier this morning, but is now feeling much better. He complains of mild epigastric pain.  Review of Systems   ROS: Positive for mild epigastric tenderness. 6 point ROS including Respiratory, CV, GI and , other than that noted in the HPI, is negative.  Physical Exam   Vital Signs with Ranges  Temp:  [96.9  F (36.1  C)-98.7  F (37.1  C)] 96.9  F (36.1  C)  Pulse:  [] 96  Heart Rate:  [] 96  Resp:  [18-24] 20  BP: (107-150)/(68-91) 120/79  SpO2:  [93 %-100 %] 97 %  I/O last 3 completed shifts:  In: 1990 [I.V.:500; NG/GT:30]  Out: 6005 [Urine:5900; Emesis/NG output:100; Blood:5]  Wt Readings from Last 1 Encounters:   03/07/17 124.1 kg (273 lb 8 oz)    Body mass index is 34.19 kg/(m^2). Resp: 20    General: Alert, oriented, cooperative, no apparent distress, appears nontoxic. Pale.  Eyes: Conjunctiva clear.  HEENT: Oropharynx is clear and moist. NJ in place.  Lymph/Hematologic: No epitrochlear, axillary, anterior or posterior cervical, or supraclavicular lymphadenopathy is appreciated.  Cardiovascular: Regular rate and rhythm, normal S1 and S2, and no murmur noted.   Respiratory: Clear to auscultation  bilaterally.   GI: Soft, normal bowel sounds, no hepatosplenomegaly.  Genitourinary: Deferred  Musculoskeletal: 1-2+ bilateral LE edema with wraps in place.  Skin: Warm and dry, no rashes.   Neurologic:  Cranial nerves are grossly intact. No focal deficits.      Intake/Output Summary (Last 24 hours) at 03/07/17 1712  Last data filed at 03/07/17 1603   Gross per 24 hour   Intake             2130 ml   Output             6455 ml   Net            -4325 ml     Data   Lines/Tubes:   Peripheral IV 03/04/17 Right;Posterior Lower forearm (Active)   Site Assessment [facility] 3/7/2017  4:01 PM   Line Status Saline locked 3/7/2017  4:01 PM   Phlebitis Scale 0-->no symptoms 3/7/2017  4:01 PM   Infiltration Scale 0 3/7/2017  4:01 PM   Extravasation? No 3/7/2017  8:00 AM   Number of days:3       Peripheral IV 03/07/17 Right;Anterior;Distal Lower forearm (Active)   Site Assessment [facility] 3/7/2017  4:01 PM   Line Status Infusing 3/7/2017  4:01 PM   Phlebitis Scale 0-->no symptoms 3/7/2017  4:01 PM   Infiltration Scale 0 3/7/2017  4:01 PM   Number of days:0       Peripheral IV 03/07/17 Left Lower forearm (Active)   Site Assessment [facility] 3/7/2017  4:01 PM   Line Status Saline locked 3/7/2017  4:01 PM   Phlebitis Scale 0-->no symptoms 3/7/2017  4:01 PM   Infiltration Scale 0 3/7/2017  4:01 PM   Number of days:0     Labs & Studies of Note: I personally reviewed the following studies.  Unresulted Labs Ordered in the Past 30 Days of this Admission     Date and Time Order Name Status Description    3/3/2017 0937 Anaerobic bacterial culture Preliminary     3/2/2017 0928 Fungus Culture, non-blood Preliminary     3/2/2017 0928 Anaerobic bacterial culture Preliminary           ROUTINE ICU LABS (Last four results)  CMP  Recent Labs  Lab 03/07/17  0617 03/06/17  0608 03/05/17  1718 03/05/17  0537  03/04/17  1251 03/04/17  0534  03/02/17  0821 03/01/17  0947    138 137 139  < >  --  136  < > 143 137   POTASSIUM 4.2 3.9 3.8 3.9  < > 3.5 3.6  < > 3.6  3.8   CHLORIDE 104 102 100 100  < >  --  100  < > 105 102   CO2 30 30 32 32  < >  --  30  < > 28 30   ANIONGAP 8 5 4 7  < >  --  7  < > 11 6   * 108* 111* 108*  < >  --  94  < > 106* 122*   BUN 33* 31* 32* 31*  < >  --  29  < > 36* 40*   CR 0.93 0.98 1.02 0.96  < >  --  1.00  < > 1.13 1.28*   GFRESTIMATED 88 83 79 85  < >  --  81  < > 70 61   GFRESTBLACK >90African American GFR Calc >90African American GFR Calc >90African American GFR Calc >90African American GFR Calc  < >  --  >90African American GFR Calc  < > 85 73   YAN 7.8* 7.7* 7.6* 7.7*  < >  --  7.8*  < > 7.3* 7.5*   MAG 2.1 2.0  --  2.0  --  1.7 1.6  --  1.7 1.8   PHOS  --   --   --   --   --   --  3.5  --   --   --    PROTTOTAL  --   --   --   --   --   --   --   --   --  6.1*   ALBUMIN  --   --   --   --   --   --   --   --  1.2* 1.3*   BILITOTAL  --   --   --   --   --   --   --   --   --  0.3   ALKPHOS  --   --   --   --   --   --   --   --   --  87   AST  --   --   --   --   --   --   --   --   --  20   ALT  --   --   --   --   --   --   --   --   --  15   < > = values in this interval not displayed.  CBC  Recent Labs  Lab 03/07/17  0946 03/07/17  0617 03/07/17  0403 03/06/17  0608 03/05/17  0537   WBC  --  14.9* 12.4* 13.0* 12.1*   RBC  --  2.91* 3.25* 3.14* 3.27*   HGB 7.0* 7.5* 8.4* 8.0* 8.5*   HCT  --  23.9* 26.7* 25.7* 26.8*   MCV  --  82 82 82 82   MCH  --  25.8* 25.8* 25.5* 26.0*   MCHC  --  31.4* 31.5 31.1* 31.7   RDW  --  17.2* 17.2* 17.1* 17.1*   PLT  --  526* 655* 527* 510*     INR  Recent Labs  Lab 03/03/17  0157   INR 1.14       Venous Blood Gas No lab results found in last 7 days.  Arterial Blood Gas No lab results found in last 7 days.  Lactic Acid   Recent Labs  Lab 03/07/17  0403 03/06/17  0010 03/04/17  1728 03/01/17  1621   LACT 1.0 0.6* 0.6* 0.6*       Inflammatory Markers    Recent Labs   Lab Test  03/06/17   0608  03/04/17   0534  03/01/17   0947  02/27/17   0626  02/25/17   2206   CRP  90.0*  70.0*  180.0*  150.0*  180.0*      Immune Globulin Studies  No lab results found.    Microbiology:  Culture Micro   Date Value Ref Range Status   03/03/2017 (A)  Final    On day 3, isolated in broth only: Staphylococcus epidermidis not isolated or   reported on routine culture Susceptibility testing in progress  Culture in progress     03/03/2017 (A)  Final    Light growth Candida albicans / dubliniensis Candida albicans and Candida   dubliniensis are not routinely speciated  Susceptibility testing not routinely done  Susceptibility testing requested by Yared Tucker, on 3/5/2017 to routine panel   including micafungin     03/02/2017 (A)  Final    Heavy growth Enterococcus faecium (VRE)  Critical Value/Significant Value called to and read back by FRANSISCA BARRERA RN   (6B).  03.04.17 0011 GJS     03/02/2017 Culture negative monitoring continues  Final   03/02/2017 Culture negative after 4 days  Final   02/26/2017 No growth  Final   02/26/2017 Canceled, Test credited Duplicate request  Final   02/26/2017 Canceled, Test credited Duplicate request  Final   02/25/2017 No growth  Final   02/25/2017 No growth  Final     Recent Labs   Lab Test  03/03/17   0936  03/02/17   1515  02/26/17   1936   CULT  On day 3, isolated in broth only: Staphylococcus epidermidis not isolated or   reported on routine culture Susceptibility testing in progress  Culture in progress  *  Light growth Candida albicans / dubliniensis Candida albicans and Candida   dubliniensis are not routinely speciated  Susceptibility testing not routinely done  Susceptibility testing requested by Yared Tucker, on 3/5/2017 to routine panel   including micafungin  *  Culture negative after 4 days  Heavy growth Enterococcus faecium (VRE)  Critical Value/Significant Value called to and read back by FRANSISCA BARRERA RN   (6B).  03.04.17 0011 GJS  *  Culture negative monitoring continues  No growth   SDES  Fluid Pancreatic necrosis  Fluid Pancreatic necrosis  Paracentesis Fluid  Paracentesis  Fluid  Paracentesis Fluid  Paracentesis Fluid  Catheterized Urine       Urine Studies:    Recent Labs   Lab Test  02/26/17   0115   LEUKEST  Negative   NITRITE  Negative   WBCU  9*   RBCU  9*       Imaging:   Recent Results (from the past 24 hour(s))   CT Abdomen Pelvis w Contrast    Narrative    Exam: CT abdomen and pelvis with  contrast    Comparison: CT 3/1/2017    History: Pancreatic abscess; necrotizing pancreatitis with acute  necrotic collections    Additional history from EMR: 46 year old male with PMH of gallstone  pancreatitis s/p cholecystectomy on 12/18/2016, complicated by  pseudocyst compressing bile duct s/p ERCP on 1/11/2017 who presents  from OSH with sepsis 2/2 necrotizing pancreatitis    Technique: CT of the abdomen and pelvis was obtained following the  administration of  intravenous contrast. Coronal and sagittal  reconstructions were obtained reviewed.    Contrast dose: 135cc of Isovue 370    Findings:    Lower chest: Large left pleural effusion with left lower lobe  compressive atelectasis. Small right-sided pleural effusion with  subsegmental atelectasis. Segmental atelectasis of the right middle  lobe lateral segment and multiple right lower lobe segments. Patchy  ground glass opacities in the right upper lobe (for example axial  series 3, image 15).    Abdomen/pelvis:     Lines and tubes: Feeding tube tip in the proximal jejunum.  Cystogastrostomy stent between the stomach and an intrapancreatic  fluid collection. Biliary stent is no longer visualized. Greenberg  catheter in the bladder.    As seen previously, there is diffuse hypoenhancement of the pancreatic  parenchyma with extensive peripancreatic inflammatory changes. The  majority of the pancreatic head, neck, and body has been replaced by a  7.6 x 3.4 cm area of walled off necrosis (coronal image 48), decreased  in size from prior. Air which is now seen within the walled off  necrosis is likely secondary to stent placement. Similarly,  the  pancreatic tail has been partially replaced by an intrapancreatic  necrotic fluid collection, although this is decreased in size  following placement of a cystogastrostomy stent.    Large volume ascites with multiple loculated collections. For example  a 6.4 x 6.0 cm collection in the left upper quadrant (axial series 3,  image 93), previously 7.0 x 6.4 cm. In the pregastric space there is a  2.5 x 4.0 cm fluid collection (axial series 3, image 148) which  previously measured 10.2 x 4.0 cm. Overall the ascites has slightly  decreased from 3/1/2017. Some debris is layering in the dependent  pelvis, unchanged.    Extensive lymphadenopathy throughout the abdomen and pelvis, likely  reactive.    Portal vein, splenic vein, and superior mesenteric vein are patent.  Abdominal aorta is normal in size.    Pneumobilia predominantly in the central left hepatic lobe, compatible  with recent biliary stent. There is mild intrahepatic biliary  dilatation which is unchanged from prior. Cholecystectomy. The spleen,  adrenal glands, kidneys, small bowel, large bowel, and appendix are  normal.    Bones: Healed left anterior fourth, fifth, and sixth rib fractures.      Impression    Impression:   1. Decreased size of the intraparenchymal walled off necrosis  involving the pancreatic head, neck, body, and tail following  cystogastrostomy stent placement.  2. Multifocal loculated collections throughout the abdomen have  slightly decreased in size from 3/1/2017. Similarly, the large volume  ascites has slightly improved.  3. Pneumobilia, which is compatible with recent biliary stent removal.    4. Large left and small right pleural effusions with bibasilar  atelectasis.  5. Mild patchy right middle lobe groundglass opacities may represent  infection versus pulmonary edema.    I have personally reviewed the examination and initial interpretation  and I agree with the findings.    MUNDO TORRES MD   XR Surgery JOJO G/T 5 Min  Fluoro    Narrative    This exam was marked as non-reportable because it will not be read by a   radiologist or a [place] non-radiologist provider.               Medications   Medications list for Reference (delete if desired)  Current Facility-Administered Medications   Medication     pantoprazole (PROTONIX) 80 mg in NaCl 0.9 % 100 mL infusion     0.9% sodium chloride infusion     acetaminophen (TYLENOL) tablet 1,000 mg     amylase-lipase-protease (CREON 24) 64599 UNITS per EC capsule 72,000 Units    And     sodium bicarbonate tablet 325 mg     furosemide (LASIX) tablet 40 mg     linezolid (ZYVOX) infusion 600 mg     micafungin (MYCAMINE) 100 mg in NaCl 0.9 % 100 mL intermittent infusion     meropenem (MERREM) 1 g vial to attach to  mL bag     lidocaine 1 % 1 mL     lidocaine (LMX4) kit     sodium chloride (PF) 0.9% PF flush 3 mL     sodium chloride (PF) 0.9% PF flush 3 mL     May continue current IV fluids if patient has IV fluids infusing.     May take regular AM medications except those listed below     sodium chloride (PF) 0.9% PF flush 3 mL     melatonin tablet 3 mg     potassium chloride SA (K-DUR/KLOR-CON M) CR tablet 20-40 mEq     potassium chloride (KLOR-CON) Packet 20-40 mEq     potassium chloride 10 mEq in 100 mL intermittent infusion     potassium chloride 10 mEq in 100 mL intermittent infusion with 10 mg lidocaine     potassium chloride 20 mEq in 50 mL intermittent infusion     magnesium sulfate 2 g in NS intermittent infusion (PharMEDium or FV Cmpd)     magnesium sulfate 4 g in 100 mL sterile water (premade)     magnesium hydroxide (MILK OF MAGNESIA) suspension 30 mL     artificial saliva (BIOTENE DRY MOUTHWASH) liquid 30 mL     senna-docusate (SENOKOT-S;PERICOLACE) 8.6-50 MG per tablet 2 tablet     artificial saliva (BIOTENE MT) solution 2 spray     pneumococcal vaccine (PNEUMOVAX 23-edna) injection 0.5 mL     nicotine Patch in Place     nicotine patch REMOVAL     nicotine (NICODERM CQ) 14  MG/24HR 24 hr patch 1 patch     dextrose 10 % 1,000 mL infusion     multivitamins with minerals (CERTAVITE/CEROVITE) liquid 15 mL     naloxone (NARCAN) injection 0.1-0.4 mg

## 2017-03-07 NOTE — OR NURSING
Dr. Elizabeth Rivera with [facility] notified that patient only received one unit of PRBCs and asked about the possibility of getting a PICC line place for the patient due to difficulty with PIVs.

## 2017-03-07 NOTE — PROGRESS NOTES
SPIRITUAL HEALTH SERVICES  [facility] ([place] 3C pre-surgery  ON-CALL VISIT     REFERRAL SOURCE: this visit was before pt's surgery in response to request communicated through consult order.     Pt declined  visit.     PLAN: pt knows he can request  support at any time through nursing.     Russel Puckett) Britta Jacobo M.Div., [facility]  Staff   Pager [phone]

## 2017-03-08 ENCOUNTER — APPOINTMENT (OUTPATIENT)
Dept: OCCUPATIONAL THERAPY | Facility: CLINIC | Age: 47
DRG: 871 | End: 2017-03-08
Attending: INTERNAL MEDICINE

## 2017-03-08 ENCOUNTER — APPOINTMENT (OUTPATIENT)
Dept: PHYSICAL THERAPY | Facility: CLINIC | Age: 47
DRG: 871 | End: 2017-03-08
Attending: INTERNAL MEDICINE

## 2017-03-08 LAB
ANION GAP SERPL CALCULATED.3IONS-SCNC: 11 MMOL/L (ref 3–14)
BASOPHILS # BLD AUTO: 0.1 10E9/L (ref 0–0.2)
BASOPHILS NFR BLD AUTO: 0.7 %
BUN SERPL-MCNC: 32 MG/DL (ref 7–30)
CALCIUM SERPL-MCNC: 7.7 MG/DL (ref 8.5–10.1)
CHLORIDE SERPL-SCNC: 104 MMOL/L (ref 94–109)
CO2 SERPL-SCNC: 26 MMOL/L (ref 20–32)
CREAT SERPL-MCNC: 0.98 MG/DL (ref 0.66–1.25)
DIFFERENTIAL METHOD BLD: ABNORMAL
EOSINOPHIL # BLD AUTO: 0.5 10E9/L (ref 0–0.7)
EOSINOPHIL NFR BLD AUTO: 5.3 %
ERYTHROCYTE [DISTWIDTH] IN BLOOD BY AUTOMATED COUNT: 17.3 % (ref 10–15)
GFR SERPL CREATININE-BSD FRML MDRD: 82 ML/MIN/1.7M2
GLUCOSE SERPL-MCNC: 98 MG/DL (ref 70–99)
HCT VFR BLD AUTO: 23.8 % (ref 40–53)
HGB BLD-MCNC: 7.4 G/DL (ref 13.3–17.7)
HGB BLD-MCNC: 8.2 G/DL (ref 13.3–17.7)
IMM GRANULOCYTES # BLD: 0.1 10E9/L (ref 0–0.4)
IMM GRANULOCYTES NFR BLD: 0.6 %
INR PPP: 1.24 (ref 0.86–1.14)
INTERPRETATION ECG - MUSE: NORMAL
LYMPHOCYTES # BLD AUTO: 1.1 10E9/L (ref 0.8–5.3)
LYMPHOCYTES NFR BLD AUTO: 10.5 %
MCH RBC QN AUTO: 26 PG (ref 26.5–33)
MCHC RBC AUTO-ENTMCNC: 31.1 G/DL (ref 31.5–36.5)
MCV RBC AUTO: 84 FL (ref 78–100)
MONOCYTES # BLD AUTO: 0.9 10E9/L (ref 0–1.3)
MONOCYTES NFR BLD AUTO: 8.4 %
NEUTROPHILS # BLD AUTO: 7.6 10E9/L (ref 1.6–8.3)
NEUTROPHILS NFR BLD AUTO: 74.5 %
NRBC # BLD AUTO: 0 10*3/UL
NRBC BLD AUTO-RTO: 0 /100
PLATELET # BLD AUTO: 578 10E9/L (ref 150–450)
POTASSIUM SERPL-SCNC: 3.7 MMOL/L (ref 3.4–5.3)
RBC # BLD AUTO: 2.85 10E12/L (ref 4.4–5.9)
SODIUM SERPL-SCNC: 141 MMOL/L (ref 133–144)
UPPER GI ENDOSCOPY: NORMAL
WBC # BLD AUTO: 10.1 10E9/L (ref 4–11)

## 2017-03-08 PROCEDURE — 80048 BASIC METABOLIC PNL TOTAL CA: CPT | Performed by: INTERNAL MEDICINE

## 2017-03-08 PROCEDURE — 85025 COMPLETE CBC W/AUTO DIFF WBC: CPT | Performed by: INTERNAL MEDICINE

## 2017-03-08 PROCEDURE — 25000132 ZZH RX MED GY IP 250 OP 250 PS 637: Performed by: STUDENT IN AN ORGANIZED HEALTH CARE EDUCATION/TRAINING PROGRAM

## 2017-03-08 PROCEDURE — 97110 THERAPEUTIC EXERCISES: CPT | Mod: GO

## 2017-03-08 PROCEDURE — 25000132 ZZH RX MED GY IP 250 OP 250 PS 637: Performed by: INTERNAL MEDICINE

## 2017-03-08 PROCEDURE — 36415 COLL VENOUS BLD VENIPUNCTURE: CPT | Performed by: STUDENT IN AN ORGANIZED HEALTH CARE EDUCATION/TRAINING PROGRAM

## 2017-03-08 PROCEDURE — 27210995 ZZH RX 272

## 2017-03-08 PROCEDURE — 97116 GAIT TRAINING THERAPY: CPT | Mod: GP | Performed by: REHABILITATION PRACTITIONER

## 2017-03-08 PROCEDURE — 36415 COLL VENOUS BLD VENIPUNCTURE: CPT | Performed by: INTERNAL MEDICINE

## 2017-03-08 PROCEDURE — 97140 MANUAL THERAPY 1/> REGIONS: CPT | Mod: GO | Performed by: OCCUPATIONAL THERAPIST

## 2017-03-08 PROCEDURE — 25000125 ZZHC RX 250: Performed by: STUDENT IN AN ORGANIZED HEALTH CARE EDUCATION/TRAINING PROGRAM

## 2017-03-08 PROCEDURE — 85018 HEMOGLOBIN: CPT | Performed by: STUDENT IN AN ORGANIZED HEALTH CARE EDUCATION/TRAINING PROGRAM

## 2017-03-08 PROCEDURE — 12000001 ZZH R&B MED SURG/OB UMMC

## 2017-03-08 PROCEDURE — 40000133 ZZH STATISTIC OT WARD VISIT

## 2017-03-08 PROCEDURE — 40000556 ZZH STATISTIC PERIPHERAL IV START W US GUIDANCE

## 2017-03-08 PROCEDURE — 99233 SBSQ HOSP IP/OBS HIGH 50: CPT | Mod: GC | Performed by: INTERNAL MEDICINE

## 2017-03-08 PROCEDURE — 25000128 H RX IP 250 OP 636: Performed by: INTERNAL MEDICINE

## 2017-03-08 PROCEDURE — 85610 PROTHROMBIN TIME: CPT | Performed by: INTERNAL MEDICINE

## 2017-03-08 PROCEDURE — 40000133 ZZH STATISTIC OT WARD VISIT: Performed by: OCCUPATIONAL THERAPIST

## 2017-03-08 PROCEDURE — 25000128 H RX IP 250 OP 636: Performed by: STUDENT IN AN ORGANIZED HEALTH CARE EDUCATION/TRAINING PROGRAM

## 2017-03-08 PROCEDURE — 97535 SELF CARE MNGMENT TRAINING: CPT | Mod: GO

## 2017-03-08 PROCEDURE — 40000193 ZZH STATISTIC PT WARD VISIT: Performed by: REHABILITATION PRACTITIONER

## 2017-03-08 PROCEDURE — 97110 THERAPEUTIC EXERCISES: CPT | Mod: GP | Performed by: REHABILITATION PRACTITIONER

## 2017-03-08 RX ORDER — LINEZOLID 600 MG/1
600 TABLET, FILM COATED ORAL EVERY 12 HOURS SCHEDULED
Status: DISCONTINUED | OUTPATIENT
Start: 2017-03-08 | End: 2017-03-18 | Stop reason: HOSPADM

## 2017-03-08 RX ORDER — FLUCONAZOLE 200 MG/1
200 TABLET ORAL DAILY
Status: DISCONTINUED | OUTPATIENT
Start: 2017-03-09 | End: 2017-03-18 | Stop reason: HOSPADM

## 2017-03-08 RX ORDER — FLUCONAZOLE 200 MG/1
400 TABLET ORAL ONCE
Status: COMPLETED | OUTPATIENT
Start: 2017-03-08 | End: 2017-03-08

## 2017-03-08 RX ORDER — QUETIAPINE FUMARATE 25 MG/1
25 TABLET, FILM COATED ORAL
Status: DISCONTINUED | OUTPATIENT
Start: 2017-03-08 | End: 2017-03-09

## 2017-03-08 RX ADMIN — PANCRELIPASE 72000 UNITS: 24000; 76000; 120000 CAPSULE, DELAYED RELEASE PELLETS ORAL at 16:34

## 2017-03-08 RX ADMIN — PANTOPRAZOLE SODIUM 40 MG: 40 INJECTION, POWDER, FOR SOLUTION INTRAVENOUS at 13:00

## 2017-03-08 RX ADMIN — NICOTINE 1 PATCH: 14 PATCH, EXTENDED RELEASE TRANSDERMAL at 08:41

## 2017-03-08 RX ADMIN — Medication 2 SPRAY: at 20:22

## 2017-03-08 RX ADMIN — MULTIVITAMIN 15 ML: LIQUID ORAL at 16:34

## 2017-03-08 RX ADMIN — ACETAMINOPHEN 1000 MG: 325 TABLET, FILM COATED ORAL at 18:41

## 2017-03-08 RX ADMIN — SODIUM BICARBONATE 325 MG: 325 TABLET ORAL at 20:22

## 2017-03-08 RX ADMIN — SENNOSIDES AND DOCUSATE SODIUM 1 TABLET: 8.6; 5 TABLET ORAL at 08:40

## 2017-03-08 RX ADMIN — ACETAMINOPHEN 1000 MG: 325 TABLET, FILM COATED ORAL at 08:40

## 2017-03-08 RX ADMIN — MEROPENEM 1 G: 1 INJECTION, POWDER, FOR SOLUTION INTRAVENOUS at 01:25

## 2017-03-08 RX ADMIN — SODIUM BICARBONATE 325 MG: 325 TABLET ORAL at 16:34

## 2017-03-08 RX ADMIN — PANCRELIPASE 24000 UNITS: 24000; 76000; 120000 CAPSULE, DELAYED RELEASE PELLETS ORAL at 02:35

## 2017-03-08 RX ADMIN — Medication 2 SPRAY: at 08:46

## 2017-03-08 RX ADMIN — SODIUM BICARBONATE 325 MG: 325 TABLET ORAL at 06:10

## 2017-03-08 RX ADMIN — MEROPENEM 1 G: 1 INJECTION, POWDER, FOR SOLUTION INTRAVENOUS at 16:34

## 2017-03-08 RX ADMIN — MEROPENEM 1 G: 1 INJECTION, POWDER, FOR SOLUTION INTRAVENOUS at 08:34

## 2017-03-08 RX ADMIN — PANCRELIPASE 24000 UNITS: 24000; 76000; 120000 CAPSULE, DELAYED RELEASE PELLETS ORAL at 06:11

## 2017-03-08 RX ADMIN — POTASSIUM CHLORIDE 20 MEQ: 750 TABLET, EXTENDED RELEASE ORAL at 12:59

## 2017-03-08 RX ADMIN — FUROSEMIDE 40 MG: 20 TABLET ORAL at 08:40

## 2017-03-08 RX ADMIN — PANCRELIPASE 72000 UNITS: 24000; 76000; 120000 CAPSULE, DELAYED RELEASE PELLETS ORAL at 20:22

## 2017-03-08 RX ADMIN — Medication 30 ML: at 08:46

## 2017-03-08 RX ADMIN — FLUCONAZOLE 400 MG: 200 TABLET ORAL at 12:59

## 2017-03-08 RX ADMIN — LINEZOLID 600 MG: 600 INJECTION, SOLUTION INTRAVENOUS at 04:47

## 2017-03-08 RX ADMIN — PANTOPRAZOLE SODIUM 40 MG: 40 INJECTION, POWDER, FOR SOLUTION INTRAVENOUS at 22:00

## 2017-03-08 RX ADMIN — SODIUM BICARBONATE 325 MG: 325 TABLET ORAL at 02:34

## 2017-03-08 RX ADMIN — LINEZOLID 600 MG: 600 TABLET, FILM COATED ORAL at 20:12

## 2017-03-08 ASSESSMENT — PAIN DESCRIPTION - DESCRIPTORS
DESCRIPTORS: DISCOMFORT
DESCRIPTORS: DISCOMFORT

## 2017-03-08 NOTE — PROGRESS NOTES
[facility]     Medicine Progress Note  Jayson Bella MRN: [id number]  1970  Date of Admission:2/25/2017  Date of Service: 03/08/2017  ___________________________________    Main Plans for Today   - Transition to PO linezolid 600mg q12  - Discontinue micafungin, start fluconazole  - Continue meropenem  - Remove stafford  - Change protonix from drip to BID    Assessment & Plan    Jayson Bella is a 46 year old White male with a past medical history significant for gallstone pancreatitis s/p cholecystectomy in 12/2016 c/b biliary obstruction d/t large pseudocyst s/p ERCP with stent placement 1/17 who was transferred from OSH with sepsis, fever, leukocytosis concerning for necrotizing pancreatitis and abscess formation.    # Necrotizing pancreatitis c/b infected summer-pancreatic walled off necrosis and loculated peritoneal fluid collections  - Transition to PO linezolid 600mg q12  - Discontinue micafungin, start fluconazole 400mg loading and then 200mg po qday  - Continue meropenem (since prior to 2/23 at OSH -> )- can transition to ertapenem 1g IV q24  - monitor for fever, hemodynamic instability  - daily CBCs and QOD CRP, procal  - Per ID, reimage abdomen in approximately 2 weeks (3/21) to assess for improvement.    # Acute GI bleed, resolved  # Hematemesis, resolved  Patient had episode of hematemesis today concerning for GI bleed. S/p EUS, EGD and necrosectomy 3/7/17.  - GI following  - EGD today  - Daily Hgb     # Hypervolemia- improved.  Ascites likely secondary to IVF in setting of sepsis and hypoalbuminemia  - recent weight of 106 kg on 2/13  - strict I&O, stafford due to urinary retention. Start tamsolusin before d/c'ing stafford  - Daily weights  - Continue furosemide 40 mg PO daily  - K/M repletion protocols with goal 4/2     # Malnutrition  - Continue tube feeds  - Nutrition consult   - Creon     # L sided exudative pleural effusion - chest tube removed 2/26. Resolved.  No growth from pleural effusion  cultures from OSH.  - chest tube removed 2/26, f/u xr without evidence of pneumo     # Pain  # Insomnia  - acetaminophen 500 mg q6h  - Morphine 7.5 mg q4h PRN available for mod-severe pain  - morphine 1-2mg q6h PRN for severe pain  - seroquel 25 mg QHS prn  - melatonin 3 mg Qevening scheduled  ------------------------------------------------------------------------------------------------------------------  Prophylaxis:     -GI: PPI bid    -DVT: SCDs, subq heaprin.    FEN: Tube feeds  Code status: Full  Disposition: Pending clinical stability and improvement  =========================================================  Patient was discussed and evaluated with Dr. Yovany Rivera MD  Internal Medicine Resident, PGY 1  Pager [phone]    Interval History   Nursing notes reviewed. Patient is doing well today and denies any complaints. He had several questions about his procedure yesterday. He denies any nausea or vomiting today.   Review of Systems   ROS: Positive for mild epigastric tenderness. 6 point ROS including Respiratory, CV, GI and , other than that noted in the HPI, is negative.  Physical Exam   Vital Signs with Ranges  Temp:  [96.5  F (35.8  C)-98.4  F (36.9  C)] 97.6  F (36.4  C)  Pulse:  [85-96] 90  Heart Rate:  [] 83  Resp:  [18-24] 18  BP: (107-136)/(68-87) 136/87  SpO2:  [96 %-100 %] 97 %  I/O last 3 completed shifts:  In: 920 [P.O.:20; I.V.:100]  Out: 5955 [Urine:5950; Blood:5]  Wt Readings from Last 1 Encounters:   03/08/17 124.5 kg (274 lb 6.4 oz)    Body mass index is 34.3 kg/(m^2). Resp: 18    General: Alert, oriented, cooperative, no apparent distress, appears nontoxic.  Eyes: Conjunctiva clear.  HEENT: NJ in place. Oropharynx is clear and moist.   Cardiovascular: Regular rate and rhythm, normal S1 and S2, and no murmur noted.   Respiratory: Clear to auscultation bilaterally.   GI: Soft, normal bowel sounds, no hepatosplenomegaly.  Genitourinary: Deferred  Musculoskeletal:  1-2+ bilateral LE edema with wraps in place.  Skin: Warm and dry, no rashes.   Neurologic:  Cranial nerves are grossly intact. No focal deficits.      Intake/Output Summary (Last 24 hours) at 03/07/17 1712  Last data filed at 03/07/17 1603   Gross per 24 hour   Intake             2130 ml   Output             6455 ml   Net            -4325 ml     Data   Lines/Tubes:   Peripheral IV 03/04/17 Right;Posterior Lower forearm (Active)   Site Assessment [facility] 3/7/2017  4:01 PM   Line Status Saline locked 3/7/2017  4:01 PM   Phlebitis Scale 0-->no symptoms 3/7/2017  4:01 PM   Infiltration Scale 0 3/7/2017  4:01 PM   Extravasation? No 3/7/2017  8:00 AM   Number of days:3       Peripheral IV 03/07/17 Right;Anterior;Distal Lower forearm (Active)   Site Assessment [facility] 3/7/2017  4:01 PM   Line Status Infusing 3/7/2017  4:01 PM   Phlebitis Scale 0-->no symptoms 3/7/2017  4:01 PM   Infiltration Scale 0 3/7/2017  4:01 PM   Number of days:0       Peripheral IV 03/07/17 Left Lower forearm (Active)   Site Assessment [facility] 3/7/2017  4:01 PM   Line Status Saline locked 3/7/2017  4:01 PM   Phlebitis Scale 0-->no symptoms 3/7/2017  4:01 PM   Infiltration Scale 0 3/7/2017  4:01 PM   Number of days:0     Labs & Studies of Note: I personally reviewed the following studies.    ROUTINE ICU LABS (Last four results)  CMP  Recent Labs  Lab 03/08/17  0703 03/07/17  0617 03/06/17  0608 03/05/17  1718 03/05/17  0537  03/04/17  1251 03/04/17  0534  03/02/17  0821    141 138 137 139  < >  --  136  < > 143   POTASSIUM 3.7 4.2 3.9 3.8 3.9  < > 3.5 3.6  < > 3.6   CHLORIDE 104 104 102 100 100  < >  --  100  < > 105   CO2 26 30 30 32 32  < >  --  30  < > 28   ANIONGAP 11 8 5 4 7  < >  --  7  < > 11   GLC 98 107* 108* 111* 108*  < >  --  94  < > 106*   BUN 32* 33* 31* 32* 31*  < >  --  29  < > 36*   CR 0.98 0.93 0.98 1.02 0.96  < >  --  1.00  < > 1.13   GFRESTIMATED 82 88 83 79 85  < >  --  81  < > 70   GFRESTBLACK >90African American GFR Calc >90African  American GFR Calc >90African American GFR Calc >90African American GFR Calc >90African American GFR Calc  < >  --  >90African American GFR Calc  < > 85   YAN 7.7* 7.8* 7.7* 7.6* 7.7*  < >  --  7.8*  < > 7.3*   MAG  --  2.1 2.0  --  2.0  --  1.7 1.6  --  1.7   PHOS  --   --   --   --   --   --   --  3.5  --   --    ALBUMIN  --   --   --   --   --   --   --   --   --  1.2*   < > = values in this interval not displayed.  CBC    Recent Labs  Lab 03/08/17  0703 03/07/17  1811 03/07/17  0946 03/07/17  0617 03/07/17  0403 03/06/17  0608   WBC 10.1  --   --  14.9* 12.4* 13.0*   RBC 2.85*  --   --  2.91* 3.25* 3.14*   HGB 7.4* 7.3* 7.0* 7.5* 8.4* 8.0*   HCT 23.8*  --   --  23.9* 26.7* 25.7*   MCV 84  --   --  82 82 82   MCH 26.0*  --   --  25.8* 25.8* 25.5*   MCHC 31.1*  --   --  31.4* 31.5 31.1*   RDW 17.3*  --   --  17.2* 17.2* 17.1*   *  --   --  526* 655* 527*       Recent Labs  Lab 03/07/17  0403 03/06/17  0010 03/04/17  1728 03/01/17  1621   LACT 1.0 0.6* 0.6* 0.6*     Microbiology:  Culture Micro   Date Value Ref Range Status   03/03/2017 (A)  Final    On day 3, isolated in broth only: Staphylococcus epidermidis not isolated or   reported on routine culture Susceptibility testing in progress  Culture in progress     03/03/2017 (A)  Final    Light growth Candida albicans / dubliniensis Candida albicans and Candida   dubliniensis are not routinely speciated  Susceptibility testing not routinely done  Susceptibility testing requested by Yared Tucker, on 3/5/2017 to routine panel   including micafungin     03/02/2017 (A)  Final    Heavy growth Enterococcus faecium (VRE)  Critical Value/Significant Value called to and read back by FRANSISCA BARRERA RN   (6B).  03.04.17 0011 GJS     03/02/2017 Culture negative monitoring continues  Final   03/02/2017 Culture negative after 4 days  Final   02/26/2017 No growth  Final   02/26/2017 Canceled, Test credited Duplicate request  Final   02/26/2017 Canceled, Test credited  Duplicate request  Final   02/25/2017 No growth  Final   02/25/2017 No growth  Final     Recent Labs   Lab Test  03/03/17   0936  03/02/17   1515  02/26/17   1936   CULT  On day 3, isolated in broth only: Staphylococcus epidermidis not isolated or   reported on routine culture Susceptibility testing in progress  Culture in progress  *  Light growth Candida albicans / dubliniensis Candida albicans and Candida   dubliniensis are not routinely speciated  Susceptibility testing not routinely done  Susceptibility testing requested by Yared Tucker, on 3/5/2017 to routine panel   including micafungin  *  Culture negative after 4 days  Heavy growth Enterococcus faecium (VRE)  Critical Value/Significant Value called to and read back by FRANSISCA BARRERA RN   (6B).  03.04.17 0011 GJS  *  Culture negative monitoring continues  No growth   SDES  Fluid Pancreatic necrosis  Fluid Pancreatic necrosis  Paracentesis Fluid  Paracentesis Fluid  Paracentesis Fluid  Paracentesis Fluid  Catheterized Urine       Urine Studies:    Recent Labs   Lab Test  02/26/17   0115   LEUKEST  Negative   NITRITE  Negative   WBCU  9*   RBCU  9*       Imaging:   Recent Results (from the past 24 hour(s))   XR Surgery JOJO G/T 5 Min Fluoro    Narrative    This exam was marked as non-reportable because it will not be read by a   radiologist or a False Pass non-radiologist provider.               Medications   Medications list for Reference (delete if desired)  Current Facility-Administered Medications   Medication     pantoprazole (PROTONIX) 40 mg IV push injection     fluconazole (DIFLUCAN) tablet 400 mg     [START ON 3/9/2017] fluconazole (DIFLUCAN) tablet 200 mg     linezolid (ZYVOX) tablet 600 mg     acetaminophen (TYLENOL) tablet 1,000 mg     sodium chloride (PF) 0.9% PF flush 3 mL     naloxone (NARCAN) injection 0.1-0.4 mg     amylase-lipase-protease (CREON 24) 86428 UNITS per EC capsule 72,000 Units    And     sodium bicarbonate tablet 325 mg      furosemide (LASIX) tablet 40 mg     meropenem (MERREM) 1 g vial to attach to  mL bag     lidocaine 1 % 1 mL     lidocaine (LMX4) kit     sodium chloride (PF) 0.9% PF flush 3 mL     sodium chloride (PF) 0.9% PF flush 3 mL     May continue current IV fluids if patient has IV fluids infusing.     May take regular AM medications except those listed below     sodium chloride (PF) 0.9% PF flush 3 mL     melatonin tablet 3 mg     potassium chloride SA (K-DUR/KLOR-CON M) CR tablet 20-40 mEq     potassium chloride (KLOR-CON) Packet 20-40 mEq     potassium chloride 10 mEq in 100 mL intermittent infusion     potassium chloride 10 mEq in 100 mL intermittent infusion with 10 mg lidocaine     potassium chloride 20 mEq in 50 mL intermittent infusion     magnesium sulfate 2 g in NS intermittent infusion ([facility]ar[facility] or FV Cmpd)     magnesium sulfate 4 g in 100 mL sterile water (premade)     magnesium hydroxide (MILK OF MAGNESIA) suspension 30 mL     artificial saliva (BIOTENE DRY MOUTHWASH) liquid 30 mL     senna-docusate (SENOKOT-S;PERICOLACE) 8.6-50 MG per tablet 2 tablet     artificial saliva (BIOTENE MT) solution 2 spray     pneumococcal vaccine (PNEUMOVAX 23-edna) injection 0.5 mL     nicotine Patch in Place     nicotine patch REMOVAL     nicotine (NICODERM CQ) 14 MG/24HR 24 hr patch 1 patch     dextrose 10 % 1,000 mL infusion     multivitamins with minerals (CERTAVITE/CEROVITE) liquid 15 mL     naloxone (NARCAN) injection 0.1-0.4 mg

## 2017-03-08 NOTE — PLAN OF CARE
Problem: Goal Outcome Summary  Goal: Goal Outcome Summary  Outcome: No Change  VSS, afebrile, c/o pain in abdomen, prefers Tylenol for pain control. Tube feeding resumed at 2115 at 30 mL/hr with 10 mL/hr increments q4 hrs if tolerating well. EGD, EUS, necrosectomy, and NJ tube replacement done in OR on days. Pt slept for much of shift, did not give melatonin and senna at bedtime as pt was unable to stay awake. Greenberg in place with good UOP. Continue frequent monitoring.

## 2017-03-08 NOTE — PLAN OF CARE
Problem: Discharge Planning  Goal: Discharge Planning (Adult, OB, Behavioral, Peds)  03/08/17 6156     Angle Perdue-Registered Nurse (Nursing)  Patient and wife seen in [facility] for NG cares and feeding. Wife RD correctly on model with all cares including changing tape on nose, site care, flushing, medication administration if needed, and use of Alonso infusion pump. Received written material related to all content taught. Asked many relevant questions. Took very detailed notes. State they understand all information but will appreciate home care support for review.

## 2017-03-08 NOTE — PLAN OF CARE
Problem: Goal Outcome Summary  Goal: Goal Outcome Summary  Outcome: No Change  Tylenol x 1 for pain. Tube feeding increased x 10mL/q4hrs, running at 50mL/hr. Lg BM. Adequate output. Denies nausea. NS @ 100mL/hr. Will continue w/POC.

## 2017-03-08 NOTE — PROGRESS NOTES
VSS. Pt took tylenol for pain. No increase in pain. Chilly this pm, warm blanket applied. Urinary catheter discountiued. No void since cath removed, continue to monitor for UA. No IV infusing. Ice and water provided. Patient did not take any food by mouth. Large BM. Patient showered. To [facility] for NG tube feeding. Continue to monitor and notify provider with any change in condition.    Yaima SANTO

## 2017-03-08 NOTE — PROGRESS NOTES
PANCREAS-BILIARY PROGRESS NOTE    ASSESSMENT:  46 year old male with past history depression, anxiety, tobacco abuse, past ETOH abuse, recent history significant for presumed gallstone pancreatitis (12/14/2016) s/p ERCP w sphincterotomy with biliary sludge (12/17/17), laparoscopic cholecystectomy (12/18/17), repeat ERCP with biliary stent (7 cm x 10 F ) for elevated LFT (1/12/17) transferred here from [facility] ([place], [place]) for further management of necrotizing pancreatitis with acute necrotic collections.   Also complicated by acute kidney injury (resolved), ascites and pleural effusions s/p chest tube (now removed).  On empiric meropenem since 2/14 (signout from [place] suggested infected ascites though we do not have those records).     3/1 Off merro ondeveloped drenching sweats, rising WBC thus restarted and CT w/IV contrast done. There is a maturing collection inferior to the stomach, partially loculated ascites, recurrent pleural effusion and immature LUQ necrotic collections and inflammation.   3/2 Ascites tap done with ~500 neutrophils and excessively high amylase/lipase c/w pancreatic ascites culture + for VRE.  3/3, Axios and solus placement in lesser sac collection. ERCP with sphincterotomy and stent removal also done.   3/3 WON culture with candida  3/7 hematemesis, had EGD and necrosectomy with clot at base of Axios in the necrotic cavity. No visible vessel to treat but this was the source of bleeding. He had old blood in the duodenum as well. Axios removed, double pigtails placed in its absence.     RECOMMENDATIONS:    Appreciate ID assistance with VRE, candidal cultures now with ongoing meropenem, linezolid and micafungin use    Repeat Abd CT 3/14 with IV contrast    Necrosectomy ~3/15. Please keep patient NPO at midnight the evening prior    Continue tube feeds through NJ tube and pancreatic enzymes included.     Advance diet to mechanical soft and low fat    Holding anticoagulation    BID  PPI    In theory Mr. Bella could have procedural therapy (necrosectomy) as an outpatient once he is on a stable antimicrobial regimen, tolerating goal tube feeds (ideally tolerating adequate PO intake but not necessary) without clinical change. Will reassess daily. He lives far away, could consider local acute rehab or lodging as well as discharge approaches    The patient care plan was discussed with Dr. Lamb, Pancreaticobiliary staff physician.    Piter Wheeler MD  GI Fellow  _______________________________________________________________  S: Doing well this morning. Stable to slightly improved abdominal pain. His wife feels he is looking more rested, energized today. No e/o ongoing bleeding. No nausea.      O:  Temp:  [96.5  F (35.8  C)-98.4  F (36.9  C)] 97.6  F (36.4  C)  Pulse:  [85-96] 90  Heart Rate:  [] 83  Resp:  [18-24] 18  BP: (107-136)/(68-87) 136/87  SpO2:  [96 %-100 %] 97 %  GEN: lying in bed, no distress, pleasant, soft spoken  HEENT: no scleral icterus, NJ in place  Resp: breathing comfortably on room air  Abd: protuberant, softer today, minimally tender in the mid abdomen without rebound or guarding. Hypoactive but present bowel sounds  Ext: legs wrapped in compression bandaging but loose, less edema present  Neuro: aao x 3    LABS:  BMP    Recent Labs  Lab 03/08/17  0703 03/07/17  0617 03/06/17  0608 03/05/17  1718    141 138 137   POTASSIUM 3.7 4.2 3.9 3.8   CHLORIDE 104 104 102 100   YAN 7.7* 7.8* 7.7* 7.6*   CO2 26 30 30 32   BUN 32* 33* 31* 32*   CR 0.98 0.93 0.98 1.02   GLC 98 107* 108* 111*     CBC    Recent Labs  Lab 03/08/17  0703  03/07/17  0617 03/07/17  0403 03/06/17  0608   WBC 10.1  --  14.9* 12.4* 13.0*   RBC 2.85*  --  2.91* 3.25* 3.14*   HGB 7.4*  < > 7.5* 8.4* 8.0*   HCT 23.8*  --  23.9* 26.7* 25.7*   MCV 84  --  82 82 82   MCH 26.0*  --  25.8* 25.8* 25.5*   MCHC 31.1*  --  31.4* 31.5 31.1*   RDW 17.3*  --  17.2* 17.2* 17.1*   *  --  526* 655* 527*   < > = values  in this interval not displayed.  INR    Recent Labs  Lab 03/03/17  0157   INR 1.14     LFTs    Recent Labs  Lab 03/02/17  0821   ALBUMIN 1.2*      PANC  No lab results found in last 7 days.

## 2017-03-08 NOTE — PROGRESS NOTES
[facility] SERVICE: ONGOING CONSULTATION      Patient:  Jayson Bella, Date of birth 1970, Medical record number [id number]  Date of Visit:  March 8, 2017         Assessment and Recommendations:   Problem List:  1. Necrotizing pancreatitis with pseudocyst formation  2. Peritonitis with growth of VRE and Yeast; multiple abscesses identified by CT 3/1      Impression:  46 year old male with PMH of gallstone pancreatitis s/p cholecystectomy on 12/18/2016, complicated by pseudocyst compressing bile duct s/p ERCP on 1/11/2017 who presents from OSH with sepsis 2/2 necrotizing pancreatitis on 2/25/2017, now with VRE/Candidal peritonitis and evidence of intra-abdominal abscesses. Despite the lack of directed therapy to VRE/Candida, he responded to empiric meropenem with decreased fever, WBC, CRP, and procalcitonin. The peritonitis was likely polymicrobial to begin with and he responded to meropenem. The presence of VRE/Candida represent the only organism that could survive the meropenem. Their importance is questionable since he improved prior to changing antimicrobial therapy. Given the presence of the pseudocyst and multiple abscesses, favor targeted treatment for these two organisms, but would also resume the meropenem. Duration of therapy will be driven by clinical parameters (already improving) and radiographic findings. CT from 3/7 shows reduction in size of abscesses. Would plan to continue antimicrobials, and reimage abdomen in ~2 weeks to determine when to stop antimicrobials.     Recommendations:  1. Continue linezolid 600mg q12h -this can be transitioned to po  2. Discontinue micafungin  3. Start fluconazole 400mg loading dose, then 200mg po daily  4. Continue meropenem -this can be transitioned to ertapenem 1g IVq24h prior to discharge  5. Patient requires weekly CBC, CMP, and CRP while on above antibiotics  6. Plan to reimage abdomen in approximately 2 weeks (~3/21) to assess for improvement    If  patient is discharged, please have weekly labs faxed to [phone] attn: Gianni (Infectious Diseases clinic).     ID will sign off. Please page if new questions arise.         Attestation:  I have reviewed today's vital signs, medications, labs and imaging.  Izabel Archer MD  Pager [phone]          Interval History:       Patient's pain is well controlled this morning with tylenol alone. Underwent necrosectomy yesterday.          Review of Systems:   CONSTITUTIONAL:  No fevers or chills  EYES: negative for icterus  ENT:  negative for oral lesions, hearing loss, tinnitus and sore throat  RESPIRATORY:  negative for cough and dyspnea  CARDIOVASCULAR:  negative for chest pain, palpitations  GASTROINTESTINAL:  negative for nausea, vomiting, diarrhea and constipation  GENITOURINARY:  negative for dysuria  HEME:  No easy bruising/bleeding  INTEGUMENT:  negative for rash and pruritus  NEURO:  Negative for headache         Current Antimicrobials   Linezolid 600mg IV q12h d1=3/4  Micafungin 100mg IV q24h d1=3/4  Meropenem 1g IV q8h d1=2/23         Physical Exam:   Ranges for vital signs:  Temp:  [96.5  F (35.8  C)-98.4  F (36.9  C)] 97.6  F (36.4  C)  Pulse:  [85-96] 90  Heart Rate:  [] 83  Resp:  [18-24] 18  BP: (107-136)/(68-87) 136/87  SpO2:  [96 %-100 %] 97 %      Exam:  GENERAL:  well-developed, well-nourished, in no acute distress.   ENT:  Head is normocephalic, atraumatic. Oropharynx is moist without exudates or ulcers.  EYES:  Eyes have anicteric sclerae. PERRL.   NECK:  Supple. No cervical lymphadenopathy  ABDOMEN:  Normal bowel sounds, soft, nontender. No hepatosplenomegaly.   EXT: Extremities warm and without edema.  SKIN:  No acute rashes.   NEUROLOGIC:  Grossly nonfocal.    ACCESS: PIVs         Laboratory Data:     Creatinine   Date Value Ref Range Status   03/08/2017 0.98 0.66 - 1.25 mg/dL Final   03/07/2017 0.93 0.66 - 1.25 mg/dL Final   03/06/2017 0.98 0.66 - 1.25 mg/dL Final    03/05/2017 1.02 0.66 - 1.25 mg/dL Final   03/05/2017 0.96 0.66 - 1.25 mg/dL Final     WBC   Date Value Ref Range Status   03/08/2017 10.1 4.0 - 11.0 10e9/L Final   03/07/2017 14.9 (H) 4.0 - 11.0 10e9/L Final   03/07/2017 12.4 (H) 4.0 - 11.0 10e9/L Final   03/06/2017 13.0 (H) 4.0 - 11.0 10e9/L Final   03/05/2017 12.1 (H) 4.0 - 11.0 10e9/L Final     Hemoglobin   Date Value Ref Range Status   03/08/2017 7.4 (L) 13.3 - 17.7 g/dL Final     Platelet Count   Date Value Ref Range Status   03/08/2017 578 (H) 150 - 450 10e9/L Final     CRP Inflammation   Date Value Ref Range Status   03/06/2017 90.0 (H) 0.0 - 8.0 mg/L Final   03/04/2017 70.0 (H) 0.0 - 8.0 mg/L Final   03/01/2017 180.0 (H) 0.0 - 8.0 mg/L Final   02/27/2017 150.0 (H) 0.0 - 8.0 mg/L Final   02/25/2017 180.0 (H) 0.0 - 8.0 mg/L Final     AST   Date Value Ref Range Status   03/01/2017 20 0 - 45 U/L Final   02/28/2017 31 0 - 45 U/L Final     Comment:     Specimen is hemolyzed which can falsely elevate AST. Analysis of a   non-hemolyzed   specimen may result in a lower value.     02/25/2017 30 0 - 45 U/L Final     ALT   Date Value Ref Range Status   03/01/2017 15 0 - 70 U/L Final   02/28/2017 16 0 - 70 U/L Final   02/25/2017 25 0 - 70 U/L Final     Bilirubin Total   Date Value Ref Range Status   03/01/2017 0.3 0.2 - 1.3 mg/dL Final   02/25/2017 0.4 0.2 - 1.3 mg/dL Final     Lab Results   Component Value Date     03/08/2017    BUN 32 (H) 03/08/2017    CO2 26 03/08/2017       Culture data:  3/2 paracentesis fluid: VRE  3/3 Peancreatic necrosis fluid: C. Albicans -fluc sensitive  All cultures:    Recent Labs  Lab 03/03/17  0936 03/02/17  1515   CULT On day 3, isolated in broth only: Staphylococcus epidermidis not isolated or reported on routine culture Susceptibility testing in progressCulture in progress*  Light growth Candida albicans / dubliniensis Candida albicans and Candida dubliniensis are not routinely speciatedSusceptibility testing not routinely  doneSusceptibility testing requested by Yared Tucker, on 3/5/2017 to routine panel including micafungin* Culture negative after 4 days  Heavy growth Enterococcus faecium (VRE)Critical Value/Significant Value called to and read back by FRANSISCA BARRERA RN ([id number]).  03.04.17 0011 GJS*  Culture negative monitoring continues     Imaging:   3/7 CT abdomen/pelvis  1. Decreased size of the intraparenchymal walled off necrosis  involving the pancreatic head, neck, body, and tail following  cystogastrostomy stent placement.  2. Multifocal loculated collections throughout the abdomen have  slightly decreased in size from 3/1/2017. Similarly, the large volume  ascites has slightly improved.  3. Pneumobilia, which is compatible with recent biliary stent removal.     4. Large left and small right pleural effusions with bibasilar  atelectasis.  5. Mild patchy right middle lobe groundglass opacities may represent  infection versus pulmonary edema.

## 2017-03-08 NOTE — PLAN OF CARE
Problem: Goal Outcome Summary  Goal: Goal Outcome Summary  Edema 7D: Wraps removed prior to edema session; measurements taken with 6/12 noted reductions bilaterally. Skin remains intact with 2+ pitting greatest in feet/ankles. Skin cares performed prior to reapplication of GCB from MTPs to knee creases for continued edema management. Remove if causing pain/numbness.

## 2017-03-08 NOTE — PLAN OF CARE
Problem: Goal Outcome Summary  Goal: Goal Outcome Summary  OT 7D: pt willing to participate in therapy session today. Performed BUE strengthening exercises with red theraband to promote strength and endurance necessary for safe completion of ADLs at home. Pt ambulated with SBA and use of fww from room down to 7th floor rehab gym, performed 2x tub transfers with CGA and appropriate use of grab bars. Pt continues to be limited by fatigue and generalized weakness.      REC home with assist from spouse and continuation of HEP once medically cleared. Pt would benefit from  PT/OT but does not have insurance coverage at this time.

## 2017-03-08 NOTE — PLAN OF CARE
Problem: Goal Outcome Summary  Goal: Goal Outcome Summary  PT - per plan established by the Physical Therapist, according to functional mobility the  discharge recommendation is home with OP PT to follow. Pt stating increased dizziness today. Pt BP after amb 144/86. Pt needing V.c for tech for supine to sit with CGA. Pt SBA to CGA for sit to stand to WW. amb up to 100'x 1 with WW with increased lateral sway needing close SBA to CGA for all standing. Pt demo seated there x program x 10 for V.c for tech and progression.

## 2017-03-09 ENCOUNTER — APPOINTMENT (OUTPATIENT)
Dept: PHYSICAL THERAPY | Facility: CLINIC | Age: 47
DRG: 871 | End: 2017-03-09
Attending: INTERNAL MEDICINE

## 2017-03-09 LAB
ANION GAP SERPL CALCULATED.3IONS-SCNC: 8 MMOL/L (ref 3–14)
BACTERIA SPEC CULT: NORMAL
BASOPHILS # BLD AUTO: 0 10E9/L (ref 0–0.2)
BASOPHILS NFR BLD AUTO: 0.4 %
BUN SERPL-MCNC: 28 MG/DL (ref 7–30)
CALCIUM SERPL-MCNC: 8.1 MG/DL (ref 8.5–10.1)
CHLORIDE SERPL-SCNC: 104 MMOL/L (ref 94–109)
CO2 SERPL-SCNC: 27 MMOL/L (ref 20–32)
CREAT SERPL-MCNC: 0.91 MG/DL (ref 0.66–1.25)
DIFFERENTIAL METHOD BLD: ABNORMAL
EOSINOPHIL # BLD AUTO: 0.5 10E9/L (ref 0–0.7)
EOSINOPHIL NFR BLD AUTO: 4.5 %
ERYTHROCYTE [DISTWIDTH] IN BLOOD BY AUTOMATED COUNT: 17.3 % (ref 10–15)
GFR SERPL CREATININE-BSD FRML MDRD: 90 ML/MIN/1.7M2
GLUCOSE SERPL-MCNC: 101 MG/DL (ref 70–99)
HCT VFR BLD AUTO: 23.5 % (ref 40–53)
HGB BLD-MCNC: 7.4 G/DL (ref 13.3–17.7)
IMM GRANULOCYTES # BLD: 0.1 10E9/L (ref 0–0.4)
IMM GRANULOCYTES NFR BLD: 0.7 %
LYMPHOCYTES # BLD AUTO: 1.3 10E9/L (ref 0.8–5.3)
LYMPHOCYTES NFR BLD AUTO: 11.7 %
Lab: NORMAL
MCH RBC QN AUTO: 25.6 PG (ref 26.5–33)
MCHC RBC AUTO-ENTMCNC: 31.5 G/DL (ref 31.5–36.5)
MCV RBC AUTO: 81 FL (ref 78–100)
MICRO REPORT STATUS: NORMAL
MONOCYTES # BLD AUTO: 1 10E9/L (ref 0–1.3)
MONOCYTES NFR BLD AUTO: 8.8 %
NEUTROPHILS # BLD AUTO: 8 10E9/L (ref 1.6–8.3)
NEUTROPHILS NFR BLD AUTO: 73.9 %
NRBC # BLD AUTO: 0 10*3/UL
NRBC BLD AUTO-RTO: 0 /100
PLATELET # BLD AUTO: 593 10E9/L (ref 150–450)
POTASSIUM SERPL-SCNC: 3.7 MMOL/L (ref 3.4–5.3)
RBC # BLD AUTO: 2.89 10E12/L (ref 4.4–5.9)
SODIUM SERPL-SCNC: 138 MMOL/L (ref 133–144)
SPECIMEN SOURCE: NORMAL
WBC # BLD AUTO: 10.9 10E9/L (ref 4–11)

## 2017-03-09 PROCEDURE — 25000132 ZZH RX MED GY IP 250 OP 250 PS 637: Performed by: STUDENT IN AN ORGANIZED HEALTH CARE EDUCATION/TRAINING PROGRAM

## 2017-03-09 PROCEDURE — 40000556 ZZH STATISTIC PERIPHERAL IV START W US GUIDANCE

## 2017-03-09 PROCEDURE — 25000125 ZZHC RX 250: Performed by: STUDENT IN AN ORGANIZED HEALTH CARE EDUCATION/TRAINING PROGRAM

## 2017-03-09 PROCEDURE — 25000128 H RX IP 250 OP 636: Performed by: STUDENT IN AN ORGANIZED HEALTH CARE EDUCATION/TRAINING PROGRAM

## 2017-03-09 PROCEDURE — 25000132 ZZH RX MED GY IP 250 OP 250 PS 637: Performed by: INTERNAL MEDICINE

## 2017-03-09 PROCEDURE — 97110 THERAPEUTIC EXERCISES: CPT | Mod: GP | Performed by: PHYSICAL THERAPIST

## 2017-03-09 PROCEDURE — 12000001 ZZH R&B MED SURG/OB UMMC

## 2017-03-09 PROCEDURE — 97116 GAIT TRAINING THERAPY: CPT | Mod: GP | Performed by: PHYSICAL THERAPIST

## 2017-03-09 PROCEDURE — 80048 BASIC METABOLIC PNL TOTAL CA: CPT | Performed by: INTERNAL MEDICINE

## 2017-03-09 PROCEDURE — 27210436 ZZH NUTRITION PRODUCT SEMIELEM INTERMED CAN

## 2017-03-09 PROCEDURE — 25000125 ZZHC RX 250: Performed by: INTERNAL MEDICINE

## 2017-03-09 PROCEDURE — 40000193 ZZH STATISTIC PT WARD VISIT: Performed by: PHYSICAL THERAPIST

## 2017-03-09 PROCEDURE — 85025 COMPLETE CBC W/AUTO DIFF WBC: CPT | Performed by: INTERNAL MEDICINE

## 2017-03-09 RX ORDER — LANOLIN ALCOHOL/MO/W.PET/CERES
3 CREAM (GRAM) TOPICAL AT BEDTIME
Status: DISCONTINUED | OUTPATIENT
Start: 2017-03-09 | End: 2017-03-18 | Stop reason: HOSPADM

## 2017-03-09 RX ORDER — FUROSEMIDE 20 MG
40 TABLET ORAL EVERY OTHER DAY
Status: DISCONTINUED | OUTPATIENT
Start: 2017-03-11 | End: 2017-03-12

## 2017-03-09 RX ADMIN — SODIUM BICARBONATE 325 MG: 325 TABLET ORAL at 22:28

## 2017-03-09 RX ADMIN — SODIUM BICARBONATE 325 MG: 325 TABLET ORAL at 05:35

## 2017-03-09 RX ADMIN — Medication 2 SPRAY: at 20:14

## 2017-03-09 RX ADMIN — SODIUM BICARBONATE 325 MG: 325 TABLET ORAL at 18:15

## 2017-03-09 RX ADMIN — LINEZOLID 600 MG: 600 TABLET, FILM COATED ORAL at 08:46

## 2017-03-09 RX ADMIN — PANCRELIPASE 72000 UNITS: 24000; 76000; 120000 CAPSULE, DELAYED RELEASE PELLETS ORAL at 18:15

## 2017-03-09 RX ADMIN — FUROSEMIDE 40 MG: 20 TABLET ORAL at 08:46

## 2017-03-09 RX ADMIN — MEROPENEM 1 G: 1 INJECTION, POWDER, FOR SOLUTION INTRAVENOUS at 09:14

## 2017-03-09 RX ADMIN — NICOTINE 1 PATCH: 14 PATCH, EXTENDED RELEASE TRANSDERMAL at 08:46

## 2017-03-09 RX ADMIN — PANCRELIPASE 72000 UNITS: 24000; 76000; 120000 CAPSULE, DELAYED RELEASE PELLETS ORAL at 05:36

## 2017-03-09 RX ADMIN — Medication 2 SPRAY: at 12:45

## 2017-03-09 RX ADMIN — QUETIAPINE FUMARATE 25 MG: 25 TABLET, FILM COATED ORAL at 20:10

## 2017-03-09 RX ADMIN — ACETAMINOPHEN 1000 MG: 325 TABLET, FILM COATED ORAL at 04:00

## 2017-03-09 RX ADMIN — PANTOPRAZOLE SODIUM 40 MG: 40 INJECTION, POWDER, FOR SOLUTION INTRAVENOUS at 08:46

## 2017-03-09 RX ADMIN — MELATONIN TAB 3 MG 3 MG: 3 TAB at 00:30

## 2017-03-09 RX ADMIN — SODIUM BICARBONATE 325 MG: 325 TABLET ORAL at 00:44

## 2017-03-09 RX ADMIN — PANTOPRAZOLE SODIUM 40 MG: 40 INJECTION, POWDER, FOR SOLUTION INTRAVENOUS at 20:13

## 2017-03-09 RX ADMIN — FLUCONAZOLE 200 MG: 200 TABLET ORAL at 08:46

## 2017-03-09 RX ADMIN — MEROPENEM 1 G: 1 INJECTION, POWDER, FOR SOLUTION INTRAVENOUS at 00:46

## 2017-03-09 RX ADMIN — MULTIVITAMIN 15 ML: LIQUID ORAL at 18:15

## 2017-03-09 RX ADMIN — PANCRELIPASE 72000 UNITS: 24000; 76000; 120000 CAPSULE, DELAYED RELEASE PELLETS ORAL at 22:28

## 2017-03-09 RX ADMIN — Medication 1 ML: at 17:46

## 2017-03-09 RX ADMIN — LINEZOLID 600 MG: 600 TABLET, FILM COATED ORAL at 20:10

## 2017-03-09 RX ADMIN — MELATONIN TAB 3 MG 3 MG: 3 TAB at 20:10

## 2017-03-09 RX ADMIN — Medication 2 SPRAY: at 08:48

## 2017-03-09 RX ADMIN — Medication 2 SPRAY: at 16:00

## 2017-03-09 RX ADMIN — MEROPENEM 1 G: 1 INJECTION, POWDER, FOR SOLUTION INTRAVENOUS at 16:05

## 2017-03-09 RX ADMIN — PANCRELIPASE 72000 UNITS: 24000; 76000; 120000 CAPSULE, DELAYED RELEASE PELLETS ORAL at 00:44

## 2017-03-09 ASSESSMENT — PAIN DESCRIPTION - DESCRIPTORS
DESCRIPTORS: DISCOMFORT
DESCRIPTORS: ACHING

## 2017-03-09 NOTE — PROGRESS NOTES
CLINICAL NUTRITION SERVICES - REASSESSMENT NOTE     Nutrition Prescription    RECOMMENDATIONS FOR MDs/PROVIDERS TO ORDER:   1. Please limit interruptions to tube feeds as much as possible as pt not receiving goal feeds per 5-day average with minimal to no PO intake.   2. Recommend changing po diet to Combination diet to include GI's recommendations for Mechanical Soft and Low Fat diet per note on 3/8.    Malnutrition Status:    Severe malnutrition in the context of chronic illness.     Recommendations already ordered by Registered Dietitian (RD):  Continue current EN regimen  - Discontinue nutritional supplements per pt request    Future/Additional Recommendations:  1. Encourage PO intake of meals/supplements.   2. When/if pt is consuming more PO, recommend PO enzyme regimen as follows: Creon 24-3 capsules with small meals/snacks/supplements, Creon 24 4 capsules with regular meals. Will need to monitor tolerance and adjust as needed.     EVALUATION OF THE PROGRESS TOWARD GOALS   Diet: Mechanical/Dental Soft   Nutrition Support: EN via NJ tube of Impact Peptide 1.5 @ 115 mL/hr x 16 hours (cycled 4pm - 8am) to provide 1840 mL, 2760 kcals (26 kcal/kg), 173 g pro (1.6 g/kg), 118 g fat, 256 g CHO, 0 g fiber, and 1417 mL free water to meet 100% of assessed needs. Tube feed flushes of 60 mL q 4 hrs.   -Receiving Creon 24,000 3 capsules mixed with sodium bicarb q 4 hrs during tube feeds.     Intake: Started cycling tube feeding on 3/6, pt tolerating goal cycle feeds per pt report and RN documentation. Pt has had minimal PO intake over the past week 2/2 frequent NPO status' and no appetite, fatigue, mild nausea, and fear of trying foods to aggravate abdominal pain. Per wife, pt is also experiencing altered tastes hindering PO intake. Pt stated he tried the supplements of Ensure Plus and the Magic Cup, but did not like either of the supplements and does not wish to receive anymore, stating they are to sweet. Provided  mechanical soft diet menu on 3/8, per wife's report he has not tried anything off the menu as pt with no appetite and is nervous to eat with past hx of abdominal pain. Wife stated he has tolerated some Naked Juice, which he liked. Wife also states pt is drinking lots of water throughout the day.     5-Day Average EN: 1219 mL  -Calories: 1828 kcals (17 kcal/kg)  -Protein: 115 g pro (1.1 g/kg)   5-Day Avg EN meeting 70% estimated kcal needs and 73% estimated protein needs. Of note, tube feeds were held on 3/7 as pt was in the OR and restarted on 3/8 at 4pm.      NEW FINDINGS   3/7: Pt with suspected GI bleed with episode of hematemesis, went to OR for: EGD, EUS, necrosectomy, and NJ tube replacement. Plan for necrosectomy again on 3/15.     Pt and wife received education regarding tube feeds.     Weight: Weight was previously trending up this admit d/t fluid. Now current wt at 120.3 kg with a 5% wt loss since admit on 2/25, however suspect wt loss r/t fluid loss as pt is diuresing with lasix with noted ascites per MD notes. There is potential for true weight loss given inadequate nutrition this admit r/t minimal PO intake, frequent NPO status, and advancing/cycling tube feeds to goal and interruptions to tube feeds.     MALNUTRITION  % Intake: < 75% for > 7 days (non-severe)  % Weight Loss: > 2% in 1 week (severe); suspect r/t fluid   Subcutaneous Fat Loss: Facial region, Upper arm and Thoracic/intercostal: mild (non-severe)  Muscle Loss: Facial & jaw region, Thoracic region (clavicle, acromium bone, deltoid, trapezius, pectoral) and Upper arm (bicep, tricep): mild (non-severe)   Fluid Accumulation/Edema: 2-3+ Generalized edema: Moderate (severe)   Malnutrition Diagnosis: Severe malnutrition in the context of chronic illness.     Previous Goals   Total avg nutritional intake to meet a minimum of 25 kcal/kg and 1.5 g PRO/kg daily (per dosing wt 105 kg).  Evaluation: Not met    Previous Nutrition Diagnosis  Inadequate  protein-energy intake related to slow EN advancement and TF currently on hold for procedure as evidenced by average EN provided since admit meets only 64% of calorie needs and 67% of protein needs.   Evaluation: No change, updated below     CURRENT NUTRITION DIAGNOSIS  Inadequate protein-energy intake related to no appetite, fear of past hx of abdominal pain with eating, fatigue, and mild nausea and inadequate enteral infusion 2/2 advancement and cycling to goal and interruptions for procedures as evidenced by pt/pts wife report of continued minimal to no PO intakes, 5-day average EN meeting 70% estimated kcal needs and 73% estimated protein needs, and 5% weight loss since admit (2 weeks).     INTERVENTIONS  Implementation  Encouraged pt to attempt PO intake. Discussed supplements, pt does not like Ensure Plus or the Magic Cup and does not want to receive. Wife asked about decreasing his tube feeding to stimulate appetite. Dietetic intern discussed that tube feeding volume was decreased on 3/6 when TF was cycled. Discussed trying foods on the new mechanical soft diet and bringing in favorite foods/fluids from home for pt to try.    Continue current EN regimen.     Goals  1. Total avg nutritional intake (PO+EN) to meet a minimum of 25 kcal/kg and 1.5 g PRO/kg daily (per dosing wt 105 kg).  2. Wt not to decline < 120 kg    Monitoring/Evaluation  Progress toward goals will be monitored and evaluated per protocol.      Juju Cardenas, Dietetic Intern     RD has read and agrees with above reassessment, interventions and recommendations.    Lidia Delacruz RD,LD  Pager [phone]

## 2017-03-09 NOTE — PLAN OF CARE
Problem: Goal Outcome Summary  Goal: Goal Outcome Summary  Outcome: No Change  VSS, afebrile, no c/o nausea, pain in abdomen controlled by Tylenol. Both PIV's stopped working, evidence of phlebitis on right arm at IV insertion site, both discontinued and new PIV placed under ultrasound in left arm. Pt has not been saving urine but states that he has been urinating without difficulty. Tolerating tube feeds at goal rate of 115 mL/hr with 60 mL water flushes q4 hours. Peptide 1.5 in pt refrigerator. Senna not given on evening shift as previous nurse stated an episode of loose stool incontinence. Pt requested Seroquel for sleep tonight, will provide when it comes up or will pass along to next shift.

## 2017-03-09 NOTE — PLAN OF CARE
Problem: Goal Outcome Summary  Goal: Goal Outcome Summary  Outcome: No Change  VSS, afebrile, denies nausea. Tylenol x 1 for abdominal pain. L) peripheral IV, saline locked. Pt has not been saving urine but states that he has been urinating without difficulty. Tolerating tube feeds at goal rate of 115 mL/hr with 60 mL water flushes q4 hours. Peptide 1.5 in pt refrigerator. Will continue w/POC.

## 2017-03-09 NOTE — PROGRESS NOTES
[facility]     Medicine Progress Note  Jayson Bella MRN: [id number]  1970  Date of Admission:2/25/2017  Date of Service: 03/09/2017  ___________________________________    Main Plans for Today   - Change furosemide to every other day  - Plan for abdominal CT 3/14 and necrosectomy 3/15    Assessment & Plan    Jayson Bella is a 46 year old White male with a past medical history significant for gallstone pancreatitis s/p cholecystectomy in 12/2016 c/b biliary obstruction d/t large pseudocyst s/p ERCP with stent placement 1/17 who was transferred from OSH with sepsis, fever, leukocytosis concerning for necrotizing pancreatitis and abscess formation.    # Necrotizing pancreatitis c/b infected summer-pancreatic walled off necrosis and loculated peritoneal fluid collections  - Continue PO linezolid 600mg q12  - Fluconazole 200mg po qday  - Continue meropenem (since prior to 2/23 at OSH -> )- can transition to ertapenem 1g IV q24 prior to discharge  - Monitor for fever, hemodynamic instability  - Daily CBCs and QOD CRP, procal  - Per GI, repeat abd CT 3/14 with IV contrast and plan for necrosectomy 3/15 (NPO at midnight)    # Acute GI bleed, resolved  # Hematemesis, resolved  Patient had episode of hematemesis concerning for GI bleed s/p EUS, EGD and necrosectomy 3/7/17.  - GI following  - Protonix bid  - Daily Hgb     # Hypervolemia- improved.  Ascites likely secondary to IVF in setting of sepsis and hypoalbuminemia  - recent weight of 106 kg on 2/13  - strict I&O  - Daily weights  - Continue furosemide 40 mg PO every other day  - K/M repletion protocols with goal 4/2     # Malnutrition  - Continue tube feeds  - Nutrition consult   - Creon     # L sided exudative pleural effusion - chest tube removed 2/26. Resolved.  No growth from pleural effusion cultures from OSH.  - chest tube removed 2/26, f/u xr without evidence of pneumo     # Pain  # Insomnia  - acetaminophen 500 mg q6h  - seroquel 25 mg QHS prn  -  melatonin 3 mg Qevening scheduled  ------------------------------------------------------------------------------------------------------------------  Prophylaxis:     -GI: PPI bid    -DVT: SCDs    FEN: Tube feeds  Code status: Full  Disposition: Pending clinical stability and improvement  =========================================================  Patient was discussed and evaluated with Dr. Yovany Rivera MD  Internal Medicine Resident, PGY 1  Pager [phone]    Interval History   Nursing notes reviewed. Jayson reports that he did not sleep well overnight. This morning, he is tired and frustrated with the long road ahead of him and all that he has been through. He denies any headache, nausea, or vomiting. He continues to have mild epigastric tenderness, but reports that it is improved.  Review of Systems   ROS: Positive for mild epigastric tenderness. 6 point ROS including Respiratory, CV, GI and , other than that noted in the HPI, is negative.  Physical Exam   Vital Signs with Ranges  Temp:  [96.2  F (35.7  C)-98.6  F (37  C)] 97.1  F (36.2  C)  Heart Rate:  [74-87] 81  Resp:  [18-20] 18  BP: (138-156)/(81-90) 149/81  SpO2:  [94 %-97 %] 97 %  I/O last 3 completed shifts:  In: 1467.5 [P.O.:270; I.V.:220]  Out: 2900 [Urine:2900]  Wt Readings from Last 1 Encounters:   03/09/17 120.3 kg (265 lb 4.8 oz)    Body mass index is 33.16 kg/(m^2). Resp: 18    General: Alert, oriented, sitting up in chair.  Eyes: Conjunctiva clear.  HEENT: NJ in place. Oropharynx is clear and moist.   Cardiovascular: Regular rate and rhythm, normal S1 and S2, and no murmur noted.   Respiratory: Clear to auscultation bilaterally.   GI: Soft, normal bowel sounds, no hepatosplenomegaly.  Genitourinary: Deferred  Musculoskeletal: 1-2+ bilateral LE edema with wraps in place.  Skin: Warm and dry, no rashes.   Neurologic:  Cranial nerves are grossly intact. No focal deficits.      Intake/Output Summary (Last 24 hours) at 03/07/17  1712  Last data filed at 03/07/17 1603   Gross per 24 hour   Intake             2130 ml   Output             6455 ml   Net            -4325 ml     Data   Lines/Tubes:   Peripheral IV 03/04/17 Right;Posterior Lower forearm (Active)   Site Assessment [facility] 3/7/2017  4:01 PM   Line Status Saline locked 3/7/2017  4:01 PM   Phlebitis Scale 0-->no symptoms 3/7/2017  4:01 PM   Infiltration Scale 0 3/7/2017  4:01 PM   Extravasation? No 3/7/2017  8:00 AM   Number of days:3       Peripheral IV 03/07/17 Right;Anterior;Distal Lower forearm (Active)   Site Assessment [facility] 3/7/2017  4:01 PM   Line Status Infusing 3/7/2017  4:01 PM   Phlebitis Scale 0-->no symptoms 3/7/2017  4:01 PM   Infiltration Scale 0 3/7/2017  4:01 PM   Number of days:0       Peripheral IV 03/07/17 Left Lower forearm (Active)   Site Assessment [facility] 3/7/2017  4:01 PM   Line Status Saline locked 3/7/2017  4:01 PM   Phlebitis Scale 0-->no symptoms 3/7/2017  4:01 PM   Infiltration Scale 0 3/7/2017  4:01 PM   Number of days:0     Labs & Studies of Note: I personally reviewed the following studies.    ROUTINE ICU LABS (Last four results)  CMP  Recent Labs  Lab 03/09/17  0638 03/08/17  0703 03/07/17  0617 03/06/17  0608  03/05/17  0537  03/04/17  1251 03/04/17  0534    141 141 138  < > 139  < >  --  136   POTASSIUM 3.7 3.7 4.2 3.9  < > 3.9  < > 3.5 3.6   CHLORIDE 104 104 104 102  < > 100  < >  --  100   CO2 27 26 30 30  < > 32  < >  --  30   ANIONGAP 8 11 8 5  < > 7  < >  --  7   * 98 107* 108*  < > 108*  < >  --  94   BUN 28 32* 33* 31*  < > 31*  < >  --  29   CR 0.91 0.98 0.93 0.98  < > 0.96  < >  --  1.00   GFRESTIMATED 90 82 88 83  < > 85  < >  --  81   GFRESTBLACK >90African American GFR Calc >90African American GFR Calc >90African American GFR Calc >90African American GFR Calc  < > >90African American GFR Calc  < >  --  >90African American GFR Calc   YAN 8.1* 7.7* 7.8* 7.7*  < > 7.7*  < >  --  7.8*   MAG  --   --  2.1 2.0  --  2.0  --  1.7 1.6   PHOS   --   --   --   --   --   --   --   --  3.5   < > = values in this interval not displayed.  CBC    Recent Labs  Lab 03/09/17  0638 03/08/17  1759 03/08/17  0703 03/07/17  1811  03/07/17  0617 03/07/17  0403   WBC 10.9  --  10.1  --   --  14.9* 12.4*   RBC 2.89*  --  2.85*  --   --  2.91* 3.25*   HGB 7.4* 8.2* 7.4* 7.3*  < > 7.5* 8.4*   HCT 23.5*  --  23.8*  --   --  23.9* 26.7*   MCV 81  --  84  --   --  82 82   MCH 25.6*  --  26.0*  --   --  25.8* 25.8*   MCHC 31.5  --  31.1*  --   --  31.4* 31.5   RDW 17.3*  --  17.3*  --   --  17.2* 17.2*   *  --  578*  --   --  526* 655*   < > = values in this interval not displayed.    Microbiology:  Culture Micro   Date Value Ref Range Status   03/03/2017 (A)  Final    On day 3, isolated in broth only: Staphylococcus epidermidis not isolated or   reported on routine culture  Culture in progress     03/03/2017 (A)  Final    Light growth Candida albicans / dubliniensis Candida albicans and Candida   dubliniensis are not routinely speciated  Susceptibility testing not routinely done  Susceptibility testing requested by Yared Tucker, on 3/5/2017 to routine panel   including micafungin     03/02/2017 (A)  Final    Heavy growth Enterococcus faecium (VRE)  Critical Value/Significant Value called to and read back by FRANSISCA BARRERA RN   (6B).  03.04.17 0011 GJS     03/02/2017 No anaerobes isolated  Final   03/02/2017 Culture negative after 1 week  Final   02/26/2017 No growth  Final   02/26/2017 Canceled, Test credited Duplicate request  Final   02/26/2017 Canceled, Test credited Duplicate request  Final   02/25/2017 No growth  Final   02/25/2017 No growth  Final     Recent Labs   Lab Test  03/03/17   0936  03/02/17   1515  02/26/17   1936   CULT  On day 3, isolated in broth only: Staphylococcus epidermidis not isolated or   reported on routine culture  Culture in progress  *  Light growth Candida albicans / dubliniensis Candida albicans and Candida   dubliniensis are not  routinely speciated  Susceptibility testing not routinely done  Susceptibility testing requested by Yared Tucker, on 3/5/2017 to routine panel   including micafungin  *  Culture negative after 1 week  No anaerobes isolated  Heavy growth Enterococcus faecium (VRE)  Critical Value/Significant Value called to and read back by FRANSISCA BARRERA RN   (6B).  03.04.17 0011 GJS  *  No growth   SDES  Fluid Pancreatic necrosis  Fluid Pancreatic necrosis  Paracentesis Fluid  Paracentesis Fluid  Paracentesis Fluid  Paracentesis Fluid  Catheterized Urine     Medications   Medications list for Reference (delete if desired)  Current Facility-Administered Medications   Medication     pantoprazole (PROTONIX) 40 mg IV push injection     fluconazole (DIFLUCAN) tablet 200 mg     linezolid (ZYVOX) tablet 600 mg     QUEtiapine (SEROquel) tablet 25 mg     acetaminophen (TYLENOL) tablet 1,000 mg     sodium chloride (PF) 0.9% PF flush 3 mL     amylase-lipase-protease (CREON 24) 09673 UNITS per EC capsule 72,000 Units    And     sodium bicarbonate tablet 325 mg     furosemide (LASIX) tablet 40 mg     meropenem (MERREM) 1 g vial to attach to  mL bag     lidocaine 1 % 1 mL     lidocaine (LMX4) kit     sodium chloride (PF) 0.9% PF flush 3 mL     sodium chloride (PF) 0.9% PF flush 3 mL     May continue current IV fluids if patient has IV fluids infusing.     May take regular AM medications except those listed below     sodium chloride (PF) 0.9% PF flush 3 mL     melatonin tablet 3 mg     potassium chloride SA (K-DUR/KLOR-CON M) CR tablet 20-40 mEq     potassium chloride (KLOR-CON) Packet 20-40 mEq     potassium chloride 10 mEq in 100 mL intermittent infusion     potassium chloride 10 mEq in 100 mL intermittent infusion with 10 mg lidocaine     potassium chloride 20 mEq in 50 mL intermittent infusion     magnesium sulfate 2 g in NS intermittent infusion ([facility]ar[facility] or FV Cmpd)     magnesium sulfate 4 g in 100 mL sterile water (premade)      magnesium hydroxide (MILK OF MAGNESIA) suspension 30 mL     artificial saliva (BIOTENE DRY MOUTHWASH) liquid 30 mL     senna-docusate (SENOKOT-S;PERICOLACE) 8.6-50 MG per tablet 2 tablet     artificial saliva (BIOTENE MT) solution 2 spray     pneumococcal vaccine (PNEUMOVAX 23-edna) injection 0.5 mL     nicotine Patch in Place     nicotine patch REMOVAL     nicotine (NICODERM CQ) 14 MG/24HR 24 hr patch 1 patch     dextrose 10 % 1,000 mL infusion     multivitamins with minerals (CERTAVITE/CEROVITE) liquid 15 mL     naloxone (NARCAN) injection 0.1-0.4 mg

## 2017-03-09 NOTE — PROGRESS NOTES
Care Coordinator- Discharge Planning     Admission Date/Time:  2/25/2017  Attending MD:  Yovany Manzanares MD     Data  Date of initial CC assessment:  2/28/17  Chart reviewed, discussed with interdisciplinary team.   Patient was admitted for:   1. Acute pancreatitis, unspecified complication status, unspecified pancreatitis type         Assessment  Full assessment completed in previous note    Coordination of Care and Referrals: Provided patient/family with options for Home Infusion.  Per MD team, pt will need enteral nutrition upon discharge. Writer called and acquired self pay quotes from home infusion agencies and DME companies. Met with pt and pt's wife, Bri, to discuss enteral nutrition and home infusion vs DME companies. Provided pt and Bri with self pay quotes for a month. Pt and Bri chose to get enteral nutrition and supplies through [facility] (phone: [phone]). Spoke with Luciana Sim, clinical service liaison for enteral nutrition at [facility], and placed referral. Pt and Bri went to [facility] on 3/8 and stated that teaching went well. Bri stated that they would like a home care visit after discharge and requested a referral be made to [facility] (phone: [phone] and fax: [phone]). Will continue to follow plan of care.      Plan  Anticipated Discharge Date:  To be determined  Anticipated Discharge Plan:  Discharge to home with assist from family and enteral nutrition and possible home care.       Caitie Bell RN

## 2017-03-09 NOTE — PLAN OF CARE
Problem: Goal Outcome Summary  Goal: Goal Outcome Summary  PT 7D- pt walked 185 feet without AD but with CGA of PT. Pt veers off path when walking and is not safe walking on his own without AD. Pt is gaining strength in LE and demonstrating improvement in his exercises. Pt fatigues with activity. Pt should be able to go home with wife. Recommend OP PT for work on balance and strength and mobility.

## 2017-03-09 NOTE — PROGRESS NOTES
PT- pt instructed in logroll technique for supine <> sit. Pt able to perform supine <> sit with good logroll technique with VC.

## 2017-03-09 NOTE — PLAN OF CARE
Problem: Pancreatitis, Acute/Chronic (Adult)  Goal: Signs and Symptoms of Listed Potential Problems Will be Absent or Manageable (Pancreatitis, Acute/Chronic)  Signs and symptoms of listed potential problems will be absent or manageable by discharge/transition of care (reference Pancreatitis, Acute/Chronic (Adult) CPG).   Outcome: Improving  Declined need for pain medications.Up in halls with standby assistance. Denies nausea. Has no appetite. Please encourage ambulation. Please do not wake at night for vital signs.

## 2017-03-10 ENCOUNTER — APPOINTMENT (OUTPATIENT)
Dept: PHYSICAL THERAPY | Facility: CLINIC | Age: 47
DRG: 871 | End: 2017-03-10
Attending: INTERNAL MEDICINE

## 2017-03-10 ENCOUNTER — APPOINTMENT (OUTPATIENT)
Dept: OCCUPATIONAL THERAPY | Facility: CLINIC | Age: 47
DRG: 871 | End: 2017-03-10
Attending: INTERNAL MEDICINE

## 2017-03-10 LAB
ANION GAP SERPL CALCULATED.3IONS-SCNC: 9 MMOL/L (ref 3–14)
BACTERIA SPEC CULT: ABNORMAL
BUN SERPL-MCNC: 26 MG/DL (ref 7–30)
CALCIUM SERPL-MCNC: 8 MG/DL (ref 8.5–10.1)
CHLORIDE SERPL-SCNC: 106 MMOL/L (ref 94–109)
CO2 SERPL-SCNC: 26 MMOL/L (ref 20–32)
CREAT SERPL-MCNC: 0.82 MG/DL (ref 0.66–1.25)
ERYTHROCYTE [DISTWIDTH] IN BLOOD BY AUTOMATED COUNT: 17.5 % (ref 10–15)
GFR SERPL CREATININE-BSD FRML MDRD: ABNORMAL ML/MIN/1.7M2
GLUCOSE SERPL-MCNC: 108 MG/DL (ref 70–99)
HCT VFR BLD AUTO: 23.9 % (ref 40–53)
HGB BLD-MCNC: 7.5 G/DL (ref 13.3–17.7)
Lab: ABNORMAL
MCH RBC QN AUTO: 25.7 PG (ref 26.5–33)
MCHC RBC AUTO-ENTMCNC: 31.4 G/DL (ref 31.5–36.5)
MCV RBC AUTO: 82 FL (ref 78–100)
MICRO REPORT STATUS: ABNORMAL
MICROORGANISM SPEC CULT: ABNORMAL
PHOSPHATE SERPL-MCNC: 2.4 MG/DL (ref 2.5–4.5)
PLATELET # BLD AUTO: 579 10E9/L (ref 150–450)
POTASSIUM SERPL-SCNC: 3.7 MMOL/L (ref 3.4–5.3)
RBC # BLD AUTO: 2.92 10E12/L (ref 4.4–5.9)
SODIUM SERPL-SCNC: 141 MMOL/L (ref 133–144)
SPECIMEN SOURCE: ABNORMAL
WBC # BLD AUTO: 9.6 10E9/L (ref 4–11)

## 2017-03-10 PROCEDURE — 25000132 ZZH RX MED GY IP 250 OP 250 PS 637: Performed by: STUDENT IN AN ORGANIZED HEALTH CARE EDUCATION/TRAINING PROGRAM

## 2017-03-10 PROCEDURE — 27210436 ZZH NUTRITION PRODUCT SEMIELEM INTERMED CAN

## 2017-03-10 PROCEDURE — 25000132 ZZH RX MED GY IP 250 OP 250 PS 637: Performed by: INTERNAL MEDICINE

## 2017-03-10 PROCEDURE — 97535 SELF CARE MNGMENT TRAINING: CPT | Mod: GO | Performed by: OCCUPATIONAL THERAPIST

## 2017-03-10 PROCEDURE — 40000193 ZZH STATISTIC PT WARD VISIT

## 2017-03-10 PROCEDURE — 25000125 ZZHC RX 250: Performed by: STUDENT IN AN ORGANIZED HEALTH CARE EDUCATION/TRAINING PROGRAM

## 2017-03-10 PROCEDURE — 36415 COLL VENOUS BLD VENIPUNCTURE: CPT | Performed by: STUDENT IN AN ORGANIZED HEALTH CARE EDUCATION/TRAINING PROGRAM

## 2017-03-10 PROCEDURE — 84100 ASSAY OF PHOSPHORUS: CPT | Performed by: STUDENT IN AN ORGANIZED HEALTH CARE EDUCATION/TRAINING PROGRAM

## 2017-03-10 PROCEDURE — 80048 BASIC METABOLIC PNL TOTAL CA: CPT | Performed by: STUDENT IN AN ORGANIZED HEALTH CARE EDUCATION/TRAINING PROGRAM

## 2017-03-10 PROCEDURE — 25000128 H RX IP 250 OP 636: Performed by: STUDENT IN AN ORGANIZED HEALTH CARE EDUCATION/TRAINING PROGRAM

## 2017-03-10 PROCEDURE — 97140 MANUAL THERAPY 1/> REGIONS: CPT | Mod: GO | Performed by: OCCUPATIONAL THERAPIST

## 2017-03-10 PROCEDURE — 97110 THERAPEUTIC EXERCISES: CPT | Mod: GP

## 2017-03-10 PROCEDURE — 40000133 ZZH STATISTIC OT WARD VISIT: Performed by: OCCUPATIONAL THERAPIST

## 2017-03-10 PROCEDURE — 97116 GAIT TRAINING THERAPY: CPT | Mod: GP

## 2017-03-10 PROCEDURE — 99233 SBSQ HOSP IP/OBS HIGH 50: CPT | Mod: GC | Performed by: INTERNAL MEDICINE

## 2017-03-10 PROCEDURE — 12000001 ZZH R&B MED SURG/OB UMMC

## 2017-03-10 PROCEDURE — 85027 COMPLETE CBC AUTOMATED: CPT | Performed by: STUDENT IN AN ORGANIZED HEALTH CARE EDUCATION/TRAINING PROGRAM

## 2017-03-10 PROCEDURE — 97110 THERAPEUTIC EXERCISES: CPT | Mod: GO | Performed by: OCCUPATIONAL THERAPIST

## 2017-03-10 RX ORDER — MIRTAZAPINE 7.5 MG/1
7.5 TABLET, FILM COATED ORAL AT BEDTIME
Status: DISCONTINUED | OUTPATIENT
Start: 2017-03-10 | End: 2017-03-18 | Stop reason: HOSPADM

## 2017-03-10 RX ORDER — MIRTAZAPINE 15 MG/1
15 TABLET, FILM COATED ORAL AT BEDTIME
Status: DISCONTINUED | OUTPATIENT
Start: 2017-03-10 | End: 2017-03-10

## 2017-03-10 RX ADMIN — SODIUM BICARBONATE 325 MG: 325 TABLET ORAL at 05:55

## 2017-03-10 RX ADMIN — PANTOPRAZOLE SODIUM 40 MG: 40 INJECTION, POWDER, FOR SOLUTION INTRAVENOUS at 20:37

## 2017-03-10 RX ADMIN — LINEZOLID 600 MG: 600 TABLET, FILM COATED ORAL at 09:10

## 2017-03-10 RX ADMIN — PANCRELIPASE 72000 UNITS: 24000; 76000; 120000 CAPSULE, DELAYED RELEASE PELLETS ORAL at 05:55

## 2017-03-10 RX ADMIN — PANCRELIPASE 72000 UNITS: 24000; 76000; 120000 CAPSULE, DELAYED RELEASE PELLETS ORAL at 02:10

## 2017-03-10 RX ADMIN — SODIUM BICARBONATE 325 MG: 325 TABLET ORAL at 02:10

## 2017-03-10 RX ADMIN — MULTIVITAMIN 15 ML: LIQUID ORAL at 16:19

## 2017-03-10 RX ADMIN — FLUCONAZOLE 200 MG: 200 TABLET ORAL at 09:10

## 2017-03-10 RX ADMIN — Medication 2 SPRAY: at 09:10

## 2017-03-10 RX ADMIN — SODIUM BICARBONATE 325 MG: 325 TABLET ORAL at 16:19

## 2017-03-10 RX ADMIN — POTASSIUM CHLORIDE 20 MEQ: 750 TABLET, EXTENDED RELEASE ORAL at 10:19

## 2017-03-10 RX ADMIN — NICOTINE 1 PATCH: 14 PATCH, EXTENDED RELEASE TRANSDERMAL at 09:21

## 2017-03-10 RX ADMIN — MEROPENEM 1 G: 1 INJECTION, POWDER, FOR SOLUTION INTRAVENOUS at 01:28

## 2017-03-10 RX ADMIN — MIRTAZAPINE 7.5 MG: 7.5 TABLET, FILM COATED ORAL at 21:59

## 2017-03-10 RX ADMIN — SODIUM BICARBONATE 325 MG: 325 TABLET ORAL at 20:37

## 2017-03-10 RX ADMIN — MELATONIN TAB 3 MG 3 MG: 3 TAB at 21:59

## 2017-03-10 RX ADMIN — PANCRELIPASE 72000 UNITS: 24000; 76000; 120000 CAPSULE, DELAYED RELEASE PELLETS ORAL at 16:19

## 2017-03-10 RX ADMIN — MEROPENEM 1 G: 1 INJECTION, POWDER, FOR SOLUTION INTRAVENOUS at 09:17

## 2017-03-10 RX ADMIN — LINEZOLID 600 MG: 600 TABLET, FILM COATED ORAL at 20:37

## 2017-03-10 RX ADMIN — PANCRELIPASE 72000 UNITS: 24000; 76000; 120000 CAPSULE, DELAYED RELEASE PELLETS ORAL at 20:37

## 2017-03-10 RX ADMIN — MEROPENEM 1 G: 1 INJECTION, POWDER, FOR SOLUTION INTRAVENOUS at 16:18

## 2017-03-10 RX ADMIN — PANTOPRAZOLE SODIUM 40 MG: 40 INJECTION, POWDER, FOR SOLUTION INTRAVENOUS at 09:14

## 2017-03-10 RX ADMIN — ACETAMINOPHEN 1000 MG: 325 TABLET, FILM COATED ORAL at 02:24

## 2017-03-10 ASSESSMENT — PAIN DESCRIPTION - DESCRIPTORS: DESCRIPTORS: DISCOMFORT

## 2017-03-10 NOTE — PLAN OF CARE
Problem: Pancreatitis, Acute/Chronic (Adult)  Goal: Signs and Symptoms of Listed Potential Problems Will be Absent or Manageable (Pancreatitis, Acute/Chronic)  Signs and symptoms of listed potential problems will be absent or manageable by discharge/transition of care (reference Pancreatitis, Acute/Chronic (Adult) CPG).   Outcome: Improving  Denied need for pain medications. Up with standby assistance. Attempting to eat and was encouraged to take small bites frequently. Feeling down so a jennifer consult ordered as well as a . Potassium replaced. Please encourage activity.

## 2017-03-10 NOTE — PROGRESS NOTES
[facility]     Medicine Progress Note  Jayson Bella MRN: [id number]  1970  Date of Admission:2/25/2017  Date of Service: 03/10/2017  ___________________________________    Main Plans for Today   - Plan for abdominal CT 3/14 and necrosectomy 3/15  - Start mirtazapine 7.5mg qday  - Regular diet per GI    Assessment & Plan    Jayson Bella is a 46 year old White male with a past medical history significant for gallstone pancreatitis s/p cholecystectomy in 12/2016 c/b biliary obstruction d/t large pseudocyst s/p ERCP with stent placement 1/17 who was transferred from OSH with sepsis, fever, leukocytosis concerning for necrotizing pancreatitis and abscess formation.    # Necrotizing pancreatitis c/b infected summer-pancreatic walled off necrosis and loculated peritoneal fluid collections  - Continue PO linezolid 600mg q12  - Fluconazole 200mg po qday  - Continue meropenem (since prior to 2/23 at OSH -> )- can transition to ertapenem 1g IV q24 prior to discharge  - Monitor for fever, hemodynamic instability  - Daily CBCs  - Per GI, repeat abd CT 3/14 with IV contrast and plan for necrosectomy 3/15 (NPO at midnight)    # Acute GI bleed, resolved  # Hematemesis, resolved  Patient had episode of hematemesis concerning for GI bleed s/p EUS, EGD and necrosectomy 3/7/17.  - GI following  - Protonix bid  - Daily Hgb     # Hypervolemia- improved.  Ascites likely secondary to IVF in setting of sepsis and hypoalbuminemia  - recent weight of 106 kg on 2/13  - strict I&O  - Daily weights  - Continue furosemide 40 mg PO every other day  - K/M repletion protocols with goal 4/2     # Malnutrition  - Continue tube feeds  - Nutrition consult   - Creon     # L sided exudative pleural effusion - chest tube removed 2/26. Resolved.  No growth from pleural effusion cultures from OSH.  - chest tube removed 2/26, f/u xr without evidence of pneumo     # Pain  # Insomnia  - acetaminophen 500 mg q6h  - seroquel 25 mg QHS prn- discontinue  while on mirtazapine  - melatonin 3 mg Qevening scheduled    # Depression, low mood  - Start mirtazapine 7.5mg   -  consult   ------------------------------------------------------------------------------------------------------------------  Prophylaxis:     -GI: PPI bid    -DVT: SCDs    FEN: Tube feeds  Code status: Full  Disposition: Pending clinical stability and improvement  =========================================================  Patient was discussed and evaluated with Dr. Yovany Rivera MD  Internal Medicine Resident, PGY 1  Pager [phone]    Interval History   Nursing notes reviewed. Jayson is feeling depressed. Food does not taste good to him and he doesn't have an appetite. He is having trouble sleeping. His wife reports that he isn't himself. He denies any nausea or vomiting.   Review of Systems   ROS: Positive for mild epigastric tenderness. 6 point ROS including Respiratory, CV, GI and , other than that noted in the HPI, is negative.  Physical Exam   Vital Signs with Ranges  Temp:  [96  F (35.6  C)-98.1  F (36.7  C)] 96.4  F (35.8  C)  Pulse:  [87] 87  Heart Rate:  [76-89] 83  Resp:  [16] 16  BP: (142-152)/(77-97) 152/92  SpO2:  [96 %-98 %] 98 %  I/O last 3 completed shifts:  In: 1470 [P.O.:100; I.V.:100; NG/GT:120]  Out: 875 [Urine:875]  Wt Readings from Last 1 Encounters:   03/10/17 116.2 kg (256 lb 1.6 oz)    Body mass index is 32.01 kg/(m^2). Resp: 16    General: Alert, oriented, sitting in bed.  Eyes: Conjunctiva clear.  HEENT: NJ in place. Oropharynx is clear and moist.   Cardiovascular: Regular rate and rhythm, normal S1 and S2, and no murmur noted.   Respiratory: Clear to auscultation bilaterally.   GI: Soft, normal bowel sounds, no hepatosplenomegaly.  Genitourinary: Deferred  Musculoskeletal: 1-2+ bilateral LE edema with wraps in place.  Skin: Warm and dry, no rashes.   Neurologic:  Cranial nerves are grossly intact. No focal deficits.  Psych: Flat  affect.    Intake/Output Summary (Last 24 hours) at 03/07/17 1712  Last data filed at 03/07/17 1603   Gross per 24 hour   Intake             2130 ml   Output             6455 ml   Net            -4325 ml     Data   Lines/Tubes:   Peripheral IV 03/04/17 Right;Posterior Lower forearm (Active)   Site Assessment [facility] 3/7/2017  4:01 PM   Line Status Saline locked 3/7/2017  4:01 PM   Phlebitis Scale 0-->no symptoms 3/7/2017  4:01 PM   Infiltration Scale 0 3/7/2017  4:01 PM   Extravasation? No 3/7/2017  8:00 AM   Number of days:3       Peripheral IV 03/07/17 Right;Anterior;Distal Lower forearm (Active)   Site Assessment [facility] 3/7/2017  4:01 PM   Line Status Infusing 3/7/2017  4:01 PM   Phlebitis Scale 0-->no symptoms 3/7/2017  4:01 PM   Infiltration Scale 0 3/7/2017  4:01 PM   Number of days:0       Peripheral IV 03/07/17 Left Lower forearm (Active)   Site Assessment [facility] 3/7/2017  4:01 PM   Line Status Saline locked 3/7/2017  4:01 PM   Phlebitis Scale 0-->no symptoms 3/7/2017  4:01 PM   Infiltration Scale 0 3/7/2017  4:01 PM   Number of days:0     Labs & Studies of Note: I personally reviewed the following studies.    ROUTINE ICU LABS (Last four results)  CMP  Recent Labs  Lab 03/10/17  0552 03/09/17  0638 03/08/17  0703 03/07/17  0617 03/06/17  0608  03/05/17  0537  03/04/17  1251 03/04/17  0534    138 141 141 138  < > 139  < >  --  136   POTASSIUM 3.7 3.7 3.7 4.2 3.9  < > 3.9  < > 3.5 3.6   CHLORIDE 106 104 104 104 102  < > 100  < >  --  100   CO2 26 27 26 30 30  < > 32  < >  --  30   ANIONGAP 9 8 11 8 5  < > 7  < >  --  7   * 101* 98 107* 108*  < > 108*  < >  --  94   BUN 26 28 32* 33* 31*  < > 31*  < >  --  29   CR 0.82 0.91 0.98 0.93 0.98  < > 0.96  < >  --  1.00   GFRESTIMATED >90Non  GFR Calc 90 82 88 83  < > 85  < >  --  81   GFRESTBLACK >90African American GFR Calc >90African American GFR Calc >90African American GFR Calc >90African American GFR Calc >90African American GFR Calc  < >  >90African American GFR Calc  < >  --  >90African American GFR Calc   YAN 8.0* 8.1* 7.7* 7.8* 7.7*  < > 7.7*  < >  --  7.8*   MAG  --   --   --  2.1 2.0  --  2.0  --  1.7 1.6   PHOS 2.4*  --   --   --   --   --   --   --   --  3.5   < > = values in this interval not displayed.  CBC    Recent Labs  Lab 03/10/17  0552 03/09/17  0638 03/08/17  1759 03/08/17  0703  03/07/17  0617   WBC 9.6 10.9  --  10.1  --  14.9*   RBC 2.92* 2.89*  --  2.85*  --  2.91*   HGB 7.5* 7.4* 8.2* 7.4*  < > 7.5*   HCT 23.9* 23.5*  --  23.8*  --  23.9*   MCV 82 81  --  84  --  82   MCH 25.7* 25.6*  --  26.0*  --  25.8*   MCHC 31.4* 31.5  --  31.1*  --  31.4*   RDW 17.5* 17.3*  --  17.3*  --  17.2*   * 593*  --  578*  --  526*   < > = values in this interval not displayed.    Microbiology:  Culture Micro   Date Value Ref Range Status   03/03/2017 (A)  Final    On day 3, isolated in broth only: Staphylococcus epidermidis not isolated or   reported on routine culture  No anaerobes isolated     03/03/2017 (A)  Final    Light growth Candida albicans / dubliniensis Candida albicans and Candida   dubliniensis are not routinely speciated  Susceptibility testing not routinely done  Susceptibility testing requested by Yared Tucker, on 3/5/2017 to routine panel   including micafungin     03/02/2017 (A)  Final    Heavy growth Enterococcus faecium (VRE)  Critical Value/Significant Value called to and read back by FRANSISCA BARRERA RN   (6B).  03.04.17 0011 GJS     03/02/2017 No anaerobes isolated  Final   03/02/2017 Culture negative after 1 week  Final   02/26/2017 No growth  Final   02/26/2017 Canceled, Test credited Duplicate request  Final   02/26/2017 Canceled, Test credited Duplicate request  Final   02/25/2017 No growth  Final   02/25/2017 No growth  Final     Recent Labs   Lab Test  03/03/17   0936  03/02/17   1515  02/26/17   1936   CULT  On day 3, isolated in broth only: Staphylococcus epidermidis not isolated or   reported on routine  culture  No anaerobes isolated  *  Light growth Candida albicans / dubliniensis Candida albicans and Candida   dubliniensis are not routinely speciated  Susceptibility testing not routinely done  Susceptibility testing requested by Yared Tucker, on 3/5/2017 to routine panel   including micafungin  *  Culture negative after 1 week  No anaerobes isolated  Heavy growth Enterococcus faecium (VRE)  Critical Value/Significant Value called to and read back by FRANSISCA BARRERA RN   (6B).  03.04.17 0011 GJS  *  No growth   SDES  Fluid Pancreatic necrosis  Fluid Pancreatic necrosis  Paracentesis Fluid  Paracentesis Fluid  Paracentesis Fluid  Paracentesis Fluid  Catheterized Urine     Medications   Medications list for Reference (delete if desired)  Current Facility-Administered Medications   Medication     mirtazapine (REMERON) tablet TABS 7.5 mg     [START ON 3/11/2017] furosemide (LASIX) tablet 40 mg     melatonin tablet 3 mg     pantoprazole (PROTONIX) 40 mg IV push injection     fluconazole (DIFLUCAN) tablet 200 mg     linezolid (ZYVOX) tablet 600 mg     acetaminophen (TYLENOL) tablet 1,000 mg     sodium chloride (PF) 0.9% PF flush 3 mL     amylase-lipase-protease (CREON 24) 67420 UNITS per EC capsule 72,000 Units    And     sodium bicarbonate tablet 325 mg     meropenem (MERREM) 1 g vial to attach to  mL bag     lidocaine 1 % 1 mL     lidocaine (LMX4) kit     sodium chloride (PF) 0.9% PF flush 3 mL     sodium chloride (PF) 0.9% PF flush 3 mL     May continue current IV fluids if patient has IV fluids infusing.     May take regular AM medications except those listed below     sodium chloride (PF) 0.9% PF flush 3 mL     potassium chloride SA (K-DUR/KLOR-CON M) CR tablet 20-40 mEq     potassium chloride (KLOR-CON) Packet 20-40 mEq     potassium chloride 10 mEq in 100 mL intermittent infusion     potassium chloride 10 mEq in 100 mL intermittent infusion with 10 mg lidocaine     potassium chloride 20 mEq in 50 mL  intermittent infusion     magnesium sulfate 2 g in NS intermittent infusion ([facility] or [facility]V Cmpd)     magnesium sulfate 4 g in 100 mL sterile water (premade)     magnesium hydroxide (MILK OF MAGNESIA) suspension 30 mL     artificial saliva (BIOTENE DRY MOUTHWASH) liquid 30 mL     senna-docusate (SENOKOT-S;PERICOLACE) 8.6-50 MG per tablet 2 tablet     artificial saliva (BIOTENE MT) solution 2 spray     pneumococcal vaccine (PNEUMOVAX 23-edna) injection 0.5 mL     nicotine Patch in Place     nicotine patch REMOVAL     nicotine (NICODERM CQ) 14 MG/24HR 24 hr patch 1 patch     dextrose 10 % 1,000 mL infusion     multivitamins with minerals (CERTAVITE/CEROVITE) liquid 15 mL     naloxone (NARCAN) injection 0.1-0.4 mg

## 2017-03-10 NOTE — PLAN OF CARE
Problem: Goal Outcome Summary  Goal: Goal Outcome Summary  Edema 7D: Wraps removed and measurements taken with 9/12 noted reductions bilaterally. Skin intact with 2+ pitting in feet/ankles with 1+ in proximal LEs. Skin cares performed prior to reapplication of GCB from MTPs to knee creases for continued edema management and protection of skin integrity. Remove if causing pain/numbness.

## 2017-03-10 NOTE — PLAN OF CARE
Problem: Goal Outcome Summary  Goal: Goal Outcome Summary     7D: Recommend discharge home with potential OP OT pending progress. Pt able to complete transfers from higher surfaces with SBA; functional mobility ~500ft with CGA and no AD to increase endurance for ADL/IADLs. Pt performed oral cares at sink with close SBA.

## 2017-03-10 NOTE — PLAN OF CARE
Problem: Goal Outcome Summary  Goal: Goal Outcome Summary  Tube feeding will be completed for day at 1000. Denies pain and nausea. Up independently

## 2017-03-10 NOTE — PLAN OF CARE
Problem: Goal Outcome Summary  Goal: Goal Outcome Summary  PT 7C: Engaged pt in bed mobility MOD I, sit <> stand IND-MOD I, gait for 400ft + 300ft no AD at SBA-CGA with intermittent sway and scissoring gait pattern, ascending/descending 4 steps x 2 at MOD I - SBA, and standing B LE strengthening exercises. PT recommending discharge to home when medically appropriate with OP PT to maximize endurance and strength.

## 2017-03-10 NOTE — PLAN OF CARE
Problem: Goal Outcome Summary  Goal: Goal Outcome Summary  Outcome: Improving  Patient remains stable with blood pressure elevated to 147/97. MD notified. Will monitor for now. Enteral feeding was started at 18:00. Feeding volume is sufficient for 8 hours. No need to mix Amylase/Lipase in the bag. This writer mixed it already. More feeding in the fridge. Seroquel and melatonin was given to help patient sleep better. No need to check  vitals overnight.

## 2017-03-10 NOTE — PROGRESS NOTES
PANCREAS-BILIARY PROGRESS NOTE    ASSESSMENT:  46 year old male with past history depression, anxiety, tobacco abuse, past ETOH abuse, recent history significant for presumed gallstone pancreatitis (12/14/2016) s/p ERCP w sphincterotomy with biliary sludge (12/17/17), laparoscopic cholecystectomy (12/18/17), repeat ERCP with biliary stent (7 cm x 10 F ) for elevated LFT (1/12/17) transferred here from [facility] ([place], [place]) for further management of necrotizing pancreatitis with acute necrotic collections.   Also complicated by acute kidney injury (resolved), ascites and pleural effusions s/p chest tube (now removed).  On empiric meropenem since 2/14 (signout from [place] suggested infected ascites though we do not have those records).     3/1 Off merro ondeveloped drenching sweats, rising WBC thus restarted and CT w/IV contrast done. There is a maturing collection inferior to the stomach, partially loculated ascites, recurrent pleural effusion and immature LUQ necrotic collections and inflammation.   3/2 Ascites tap done with ~500 neutrophils and excessively high amylase/lipase c/w pancreatic ascites culture + for VRE.  3/3, Axios and solus placement in lesser sac collection. ERCP with sphincterotomy and stent removal also done.   3/3 WON culture with candida  3/7 hematemesis, had EGD and necrosectomy with clot at base of Axios in the necrotic cavity. No visible vessel to treat but this was the source of bleeding. He had old blood in the duodenum as well. Axios removed, double pigtails placed in its absence.     RECOMMENDATIONS:    Cont antimicrobials per ID (fluconazole, linezolid, merro/erta likely to continue for another 11 days or so)    Repeat Abd CT 3/14 with IV contrast. If there is significant amount of ascites remaining consider re-tapping to ensure no ongoing infection in the ascitic fluid    Necrosectomy ~3/15. Please keep patient NPO at midnight the evening prior    Continue tube feeds through NJ  tube and pancreatic enzymes included.     Low fat diet    PT/OT    Holding anticoagulation    BID PPI    The patient care plan was discussed with Dr. Cadena, Pancreaticobiliary staff physician.    Piter Wheeler MD  GI Fellow  _______________________________________________________________  S: Doing well this morning. Stable to slightly improved abdominal pain. His wife feels he is looking more rested, energized today. No e/o ongoing bleeding. No nausea.      O:  Temp:  [97.2  F (36.2  C)-98.1  F (36.7  C)] 97.9  F (36.6  C)  Pulse:  [87] 87  Heart Rate:  [76-89] 89  Resp:  [16-18] 16  BP: (138-151)/(77-97) 142/77  SpO2:  [95 %-97 %] 96 %  GEN: lying in bed, no distress, pleasant, soft spoken  HEENT: no scleral icterus, NJ in place  Resp: breathing comfortably on room air  Abd: protuberant, softer today, minimally tender in the mid abdomen without rebound or guarding. Hypoactive but present bowel sounds  Ext: legs wrapped in compression bandaging but loose, less edema present  Neuro: aao x 3    LABS:  BMP    Recent Labs  Lab 03/10/17  0552 03/09/17  0638 03/08/17  0703 03/07/17  0617    138 141 141   POTASSIUM 3.7 3.7 3.7 4.2   CHLORIDE 106 104 104 104   YAN 8.0* 8.1* 7.7* 7.8*   CO2 26 27 26 30   BUN 26 28 32* 33*   CR 0.82 0.91 0.98 0.93   * 101* 98 107*     CBC    Recent Labs  Lab 03/10/17  0552 03/09/17 0638  03/08/17  0703  03/07/17  0617   WBC 9.6 10.9  --  10.1  --  14.9*   RBC 2.92* 2.89*  --  2.85*  --  2.91*   HGB 7.5* 7.4*  < > 7.4*  < > 7.5*   HCT 23.9* 23.5*  --  23.8*  --  23.9*   MCV 82 81  --  84  --  82   MCH 25.7* 25.6*  --  26.0*  --  25.8*   MCHC 31.4* 31.5  --  31.1*  --  31.4*   RDW 17.5* 17.3*  --  17.3*  --  17.2*   * 593*  --  578*  --  526*   < > = values in this interval not displayed.  INR    Recent Labs  Lab 03/08/17  1159   INR 1.24*     LFTs  No lab results found in last 7 days.   PANC  No lab results found in last 7 days.

## 2017-03-10 NOTE — PHARMACY-PHARMACOTHERAPY NOTE
Linezolid/Antidepressant Interaction Note    This patient is currently receiving both linezolid and the antidepressant, mirtazipine.  Please be aware that linezolid is a weak monoamine oxidase inhibitor, and when used in combination with antidepressants may lead to serotonin syndrome.  The interaction is idiosyncratic, but caution is advised due to several case reports documented in the literature.       If another antibiotic is not an option for this patient, please monitor closely for signs of serotonin syndrome.  These symptoms may include:  fever, mental status changes, agitation, myoclonus, hyperreflexia, tremor, diaphoresis, shivering, diarrhea, and poor coordination.  Please contact a pharmacist for a list of case reports or with questions regarding alternate therapy.

## 2017-03-11 ENCOUNTER — APPOINTMENT (OUTPATIENT)
Dept: OCCUPATIONAL THERAPY | Facility: CLINIC | Age: 47
DRG: 871 | End: 2017-03-11
Attending: INTERNAL MEDICINE

## 2017-03-11 LAB
ERYTHROCYTE [DISTWIDTH] IN BLOOD BY AUTOMATED COUNT: 17.5 % (ref 10–15)
HCT VFR BLD AUTO: 23 % (ref 40–53)
HGB BLD-MCNC: 7.2 G/DL (ref 13.3–17.7)
MCH RBC QN AUTO: 25.6 PG (ref 26.5–33)
MCHC RBC AUTO-ENTMCNC: 31.3 G/DL (ref 31.5–36.5)
MCV RBC AUTO: 82 FL (ref 78–100)
PLATELET # BLD AUTO: 563 10E9/L (ref 150–450)
RBC # BLD AUTO: 2.81 10E12/L (ref 4.4–5.9)
WBC # BLD AUTO: 10.3 10E9/L (ref 4–11)

## 2017-03-11 PROCEDURE — 25000128 H RX IP 250 OP 636: Performed by: STUDENT IN AN ORGANIZED HEALTH CARE EDUCATION/TRAINING PROGRAM

## 2017-03-11 PROCEDURE — 85027 COMPLETE CBC AUTOMATED: CPT | Performed by: STUDENT IN AN ORGANIZED HEALTH CARE EDUCATION/TRAINING PROGRAM

## 2017-03-11 PROCEDURE — 25000132 ZZH RX MED GY IP 250 OP 250 PS 637: Performed by: STUDENT IN AN ORGANIZED HEALTH CARE EDUCATION/TRAINING PROGRAM

## 2017-03-11 PROCEDURE — 99233 SBSQ HOSP IP/OBS HIGH 50: CPT | Mod: GC | Performed by: INTERNAL MEDICINE

## 2017-03-11 PROCEDURE — 25000132 ZZH RX MED GY IP 250 OP 250 PS 637: Performed by: INTERNAL MEDICINE

## 2017-03-11 PROCEDURE — 40000133 ZZH STATISTIC OT WARD VISIT: Performed by: OCCUPATIONAL THERAPIST

## 2017-03-11 PROCEDURE — 12000008 ZZH R&B INTERMEDIATE UMMC

## 2017-03-11 PROCEDURE — 27210436 ZZH NUTRITION PRODUCT SEMIELEM INTERMED CAN

## 2017-03-11 PROCEDURE — 97535 SELF CARE MNGMENT TRAINING: CPT | Mod: GO | Performed by: OCCUPATIONAL THERAPIST

## 2017-03-11 PROCEDURE — 36415 COLL VENOUS BLD VENIPUNCTURE: CPT | Performed by: STUDENT IN AN ORGANIZED HEALTH CARE EDUCATION/TRAINING PROGRAM

## 2017-03-11 PROCEDURE — 25000125 ZZHC RX 250: Performed by: STUDENT IN AN ORGANIZED HEALTH CARE EDUCATION/TRAINING PROGRAM

## 2017-03-11 RX ORDER — PANTOPRAZOLE SODIUM 40 MG/1
40 TABLET, DELAYED RELEASE ORAL
Status: DISCONTINUED | OUTPATIENT
Start: 2017-03-11 | End: 2017-03-18 | Stop reason: HOSPADM

## 2017-03-11 RX ADMIN — SODIUM BICARBONATE 325 MG: 325 TABLET ORAL at 20:02

## 2017-03-11 RX ADMIN — ACETAMINOPHEN 1000 MG: 325 TABLET, FILM COATED ORAL at 08:58

## 2017-03-11 RX ADMIN — SODIUM BICARBONATE 325 MG: 325 TABLET ORAL at 05:15

## 2017-03-11 RX ADMIN — PANCRELIPASE 72000 UNITS: 24000; 76000; 120000 CAPSULE, DELAYED RELEASE PELLETS ORAL at 05:15

## 2017-03-11 RX ADMIN — ACETAMINOPHEN 1000 MG: 325 TABLET, FILM COATED ORAL at 01:07

## 2017-03-11 RX ADMIN — MIRTAZAPINE 7.5 MG: 7.5 TABLET, FILM COATED ORAL at 21:32

## 2017-03-11 RX ADMIN — SODIUM BICARBONATE 325 MG: 325 TABLET ORAL at 16:17

## 2017-03-11 RX ADMIN — PANCRELIPASE 72000 UNITS: 24000; 76000; 120000 CAPSULE, DELAYED RELEASE PELLETS ORAL at 20:02

## 2017-03-11 RX ADMIN — PANTOPRAZOLE SODIUM 40 MG: 40 INJECTION, POWDER, FOR SOLUTION INTRAVENOUS at 08:40

## 2017-03-11 RX ADMIN — ACETAMINOPHEN 1000 MG: 325 TABLET, FILM COATED ORAL at 18:53

## 2017-03-11 RX ADMIN — SODIUM BICARBONATE 325 MG: 325 TABLET ORAL at 00:58

## 2017-03-11 RX ADMIN — MEROPENEM 1 G: 1 INJECTION, POWDER, FOR SOLUTION INTRAVENOUS at 16:17

## 2017-03-11 RX ADMIN — MULTIVITAMIN 15 ML: LIQUID ORAL at 16:17

## 2017-03-11 RX ADMIN — FUROSEMIDE 40 MG: 20 TABLET ORAL at 08:48

## 2017-03-11 RX ADMIN — NICOTINE 1 PATCH: 14 PATCH, EXTENDED RELEASE TRANSDERMAL at 08:45

## 2017-03-11 RX ADMIN — FLUCONAZOLE 200 MG: 200 TABLET ORAL at 08:48

## 2017-03-11 RX ADMIN — MELATONIN TAB 3 MG 3 MG: 3 TAB at 20:02

## 2017-03-11 RX ADMIN — PANTOPRAZOLE SODIUM 40 MG: 40 TABLET, DELAYED RELEASE ORAL at 16:17

## 2017-03-11 RX ADMIN — LINEZOLID 600 MG: 600 TABLET, FILM COATED ORAL at 08:48

## 2017-03-11 RX ADMIN — MEROPENEM 1 G: 1 INJECTION, POWDER, FOR SOLUTION INTRAVENOUS at 08:44

## 2017-03-11 RX ADMIN — PANCRELIPASE 72000 UNITS: 24000; 76000; 120000 CAPSULE, DELAYED RELEASE PELLETS ORAL at 16:17

## 2017-03-11 RX ADMIN — MEROPENEM 1 G: 1 INJECTION, POWDER, FOR SOLUTION INTRAVENOUS at 01:00

## 2017-03-11 RX ADMIN — Medication 2 SPRAY: at 01:01

## 2017-03-11 RX ADMIN — PANCRELIPASE 72000 UNITS: 24000; 76000; 120000 CAPSULE, DELAYED RELEASE PELLETS ORAL at 00:58

## 2017-03-11 RX ADMIN — LINEZOLID 600 MG: 600 TABLET, FILM COATED ORAL at 20:02

## 2017-03-11 ASSESSMENT — PAIN DESCRIPTION - DESCRIPTORS
DESCRIPTORS: ACHING
DESCRIPTORS: ACHING

## 2017-03-11 NOTE — PLAN OF CARE
Problem: Pancreatitis, Acute/Chronic (Adult)  Goal: Signs and Symptoms of Listed Potential Problems Will be Absent or Manageable (Pancreatitis, Acute/Chronic)  Signs and symptoms of listed potential problems will be absent or manageable by discharge/transition of care (reference Pancreatitis, Acute/Chronic (Adult) CPG).   Outcome: Improving  Feeling better today. Tylenol for left sided pain with relief. Working on eating. Up independently. Denies nausea.

## 2017-03-11 NOTE — PLAN OF CARE
Problem: Goal Outcome Summary  Goal: Goal Outcome Summary  OT: 7D: OT reviewed current OT goals and POC w/ pt, pt demonstrated toilet transfer ind in room w/ steady transfer throughout. Pt SBA in room and in canales ambulation to therapy gym w/ tub/shower. Pt completed tub transfer ind after education and training on proper body mechanics to side step into tub to increase stability. Pt stating ind in HEP and EC techniques. All ADL goals met or approrpaite to be dishcarged, OT to continue to follow for edema per edema reccomendations.   7D: Recommend discharge home with potential OP OT pending progress.

## 2017-03-11 NOTE — PROGRESS NOTES
[facility]     Medicine Progress Note  Jayson Bella MRN: [id number]  1970  Date of Admission:2/25/2017  Date of Service: 03/11/2017  ___________________________________    Main Plans for Today   - Continue ambulating  - CBC in AM    Assessment & Plan    Jayson Bella is a 46 year old White male with a past medical history significant for gallstone pancreatitis s/p cholecystectomy in 12/2016 c/b biliary obstruction d/t large pseudocyst s/p ERCP with stent placement 1/17 who was transferred from OSH with sepsis, fever, leukocytosis concerning for necrotizing pancreatitis and abscess formation.    # Necrotizing pancreatitis c/b infected summer-pancreatic walled off necrosis and loculated peritoneal fluid collections  - Continue PO linezolid 600mg q12  - Fluconazole 200mg po qday  - Meropenem transition to ertapenem 1g IV q24  - Monitor for fever, hemodynamic instability  - Daily CBCs  - Per GI, repeat abd CT 3/14 with IV contrast and plan for necrosectomy 3/15 (NPO at midnight)    # Acute GI bleed, resolved  # Hematemesis, resolved  Patient had episode of hematemesis concerning for GI bleed s/p EUS, EGD and necrosectomy 3/7/17.  - GI following  - Protonix bid  - Daily Hgb     # Hypervolemia- improved.  Ascites likely secondary to IVF in setting of sepsis and hypoalbuminemia  - recent weight of 106 kg on 2/13  - strict I&O  - Daily weights  - Continue furosemide 40 mg PO every other day  - K/M repletion protocols with goal 4/2     # Malnutrition  - Continue tube feeds  - Nutrition consult   - Creon     # L sided exudative pleural effusion - chest tube removed 2/26. Resolved.  No growth from pleural effusion cultures from OSH.  - chest tube removed 2/26, f/u xr without evidence of pneumo     # Pain  # Insomnia  - acetaminophen 500 mg q6h  - seroquel 25 mg QHS prn- discontinue while on mirtazapine  - melatonin 3 mg Qevening scheduled    # Depression, low mood  - Start mirtazapine 7.5mg   -  consult    ------------------------------------------------------------------------------------------------------------------  Prophylaxis:     -GI: PPI bid    -DVT: SCDs    FEN: Tube feeds  Code status: Full  Disposition: Pending clinical stability and improvement  =========================================================  Patient was discussed and evaluated with Dr. Yovany Rivera MD  Internal Medicine Resident, PGY 1  Pager [phone]    Interval History   Nursing notes reviewed. Jayson just returned from a long walk. He denies any pain. He slept better last night. He still does not have much of an appetite.   Review of Systems   ROS: Positive for mild epigastric tenderness. 6 point ROS including Respiratory, CV, GI and , other than that noted in the HPI, is negative.  Physical Exam   Vital Signs with Ranges  Temp:  [96.4  F (35.8  C)-98.8  F (37.1  C)] 97.1  F (36.2  C)  Pulse:  [82] 82  Heart Rate:  [82-99] 86  Resp:  [16-18] 18  BP: (136-152)/(88-94) 136/88  SpO2:  [96 %-98 %] 97 %  I/O last 3 completed shifts:  In: 1315 [I.V.:100; NG/GT:180]  Out: -   Wt Readings from Last 1 Encounters:   03/11/17 112.3 kg (247 lb 9.2 oz)    Body mass index is 30.94 kg/(m^2). Resp: 18    General: Alert, oriented, sitting in chair, dressed.  Eyes: Conjunctiva clear.  HEENT: NJ in place. Oropharynx is clear and moist.   Cardiovascular: Regular rate and rhythm, normal S1 and S2, and no murmur noted.   Respiratory: Clear to auscultation bilaterally.   GI: Soft, normal bowel sounds, no hepatosplenomegaly.  Genitourinary: Deferred  Musculoskeletal: 1-2+ bilateral LE edema.  Skin: Warm and dry, no rashes.   Neurologic:  Cranial nerves are grossly intact. No focal deficits.    Intake/Output Summary (Last 24 hours) at 03/07/17 1712  Last data filed at 03/07/17 1603   Gross per 24 hour   Intake             2130 ml   Output             6455 ml   Net            -4325 ml     Data   Lines/Tubes:   Peripheral IV 03/04/17  Right;Posterior Lower forearm (Active)   Site Assessment [facility] 3/7/2017  4:01 PM   Line Status Saline locked 3/7/2017  4:01 PM   Phlebitis Scale 0-->no symptoms 3/7/2017  4:01 PM   Infiltration Scale 0 3/7/2017  4:01 PM   Extravasation? No 3/7/2017  8:00 AM   Number of days:3       Peripheral IV 03/07/17 Right;Anterior;Distal Lower forearm (Active)   Site Assessment [facility] 3/7/2017  4:01 PM   Line Status Infusing 3/7/2017  4:01 PM   Phlebitis Scale 0-->no symptoms 3/7/2017  4:01 PM   Infiltration Scale 0 3/7/2017  4:01 PM   Number of days:0       Peripheral IV 03/07/17 Left Lower forearm (Active)   Site Assessment [facility] 3/7/2017  4:01 PM   Line Status Saline locked 3/7/2017  4:01 PM   Phlebitis Scale 0-->no symptoms 3/7/2017  4:01 PM   Infiltration Scale 0 3/7/2017  4:01 PM   Number of days:0     Labs & Studies of Note: I personally reviewed the following studies.    ROUTINE ICU LABS (Last four results)  CMP  Recent Labs  Lab 03/10/17  0552 03/09/17  0638 03/08/17  0703 03/07/17  0617 03/06/17  0608  03/05/17  0537    138 141 141 138  < > 139   POTASSIUM 3.7 3.7 3.7 4.2 3.9  < > 3.9   CHLORIDE 106 104 104 104 102  < > 100   CO2 26 27 26 30 30  < > 32   ANIONGAP 9 8 11 8 5  < > 7   * 101* 98 107* 108*  < > 108*   BUN 26 28 32* 33* 31*  < > 31*   CR 0.82 0.91 0.98 0.93 0.98  < > 0.96   GFRESTIMATED >90Non  GFR Calc 90 82 88 83  < > 85   GFRESTBLACK >90African American GFR Calc >90African American GFR Calc >90African American GFR Calc >90African American GFR Calc >90African American GFR Calc  < > >90African American GFR Calc   YAN 8.0* 8.1* 7.7* 7.8* 7.7*  < > 7.7*   MAG  --   --   --  2.1 2.0  --  2.0   PHOS 2.4*  --   --   --   --   --   --    < > = values in this interval not displayed.  CBC    Recent Labs  Lab 03/11/17  0530 03/10/17  0552 03/09/17  0638 03/08/17  1759 03/08/17  0703   WBC 10.3 9.6 10.9  --  10.1   RBC 2.81* 2.92* 2.89*  --  2.85*   HGB 7.2* 7.5* 7.4* 8.2* 7.4*   HCT 23.0*  23.9* 23.5*  --  23.8*   MCV 82 82 81  --  84   MCH 25.6* 25.7* 25.6*  --  26.0*   MCHC 31.3* 31.4* 31.5  --  31.1*   RDW 17.5* 17.5* 17.3*  --  17.3*   * 579* 593*  --  578*       Microbiology:  Culture Micro   Date Value Ref Range Status   03/03/2017 (A)  Final    On day 3, isolated in broth only: Staphylococcus epidermidis not isolated or   reported on routine culture  No anaerobes isolated     03/03/2017 (A)  Final    Light growth Candida albicans / dubliniensis Candida albicans and Candida   dubliniensis are not routinely speciated  Susceptibility testing not routinely done  Susceptibility testing requested by Yared Tucker, on 3/5/2017 to routine panel   including micafungin     03/02/2017 (A)  Final    Heavy growth Enterococcus faecium (VRE)  Critical Value/Significant Value called to and read back by FRANSISCA BARRERA RN   (6B).  03.04.17 0011 GJS     03/02/2017 No anaerobes isolated  Final   03/02/2017 Culture negative after 1 week  Final   02/26/2017 No growth  Final   02/26/2017 Canceled, Test credited Duplicate request  Final   02/26/2017 Canceled, Test credited Duplicate request  Final   02/25/2017 No growth  Final   02/25/2017 No growth  Final     Recent Labs   Lab Test  03/03/17   0936  03/02/17   1515  02/26/17   1936   CULT  On day 3, isolated in broth only: Staphylococcus epidermidis not isolated or   reported on routine culture  No anaerobes isolated  *  Light growth Candida albicans / dubliniensis Candida albicans and Candida   dubliniensis are not routinely speciated  Susceptibility testing not routinely done  Susceptibility testing requested by Yared Tucker, on 3/5/2017 to routine panel   including micafungin  *  Culture negative after 1 week  No anaerobes isolated  Heavy growth Enterococcus faecium (VRE)  Critical Value/Significant Value called to and read back by FRANSISCA BARRERA RN   (6B).  03.04.17 0011 GJS  *  No growth   SDES  Fluid Pancreatic necrosis  Fluid Pancreatic necrosis   Paracentesis Fluid  Paracentesis Fluid  Paracentesis Fluid  Paracentesis Fluid  Catheterized Urine     Medications   Medications list for Reference (delete if desired)  Current Facility-Administered Medications   Medication     pantoprazole (PROTONIX) EC tablet 40 mg     mirtazapine (REMERON) tablet TABS 7.5 mg     furosemide (LASIX) tablet 40 mg     melatonin tablet 3 mg     fluconazole (DIFLUCAN) tablet 200 mg     linezolid (ZYVOX) tablet 600 mg     acetaminophen (TYLENOL) tablet 1,000 mg     sodium chloride (PF) 0.9% PF flush 3 mL     amylase-lipase-protease (CREON 24) 58535 UNITS per EC capsule 72,000 Units    And     sodium bicarbonate tablet 325 mg     meropenem (MERREM) 1 g vial to attach to  mL bag     lidocaine 1 % 1 mL     lidocaine (LMX4) kit     sodium chloride (PF) 0.9% PF flush 3 mL     sodium chloride (PF) 0.9% PF flush 3 mL     May continue current IV fluids if patient has IV fluids infusing.     May take regular AM medications except those listed below     sodium chloride (PF) 0.9% PF flush 3 mL     potassium chloride SA (K-DUR/KLOR-CON M) CR tablet 20-40 mEq     potassium chloride (KLOR-CON) Packet 20-40 mEq     potassium chloride 10 mEq in 100 mL intermittent infusion     potassium chloride 10 mEq in 100 mL intermittent infusion with 10 mg lidocaine     potassium chloride 20 mEq in 50 mL intermittent infusion     magnesium sulfate 2 g in NS intermittent infusion (PharMEDium or FV Cmpd)     magnesium sulfate 4 g in 100 mL sterile water (premade)     magnesium hydroxide (MILK OF MAGNESIA) suspension 30 mL     artificial saliva (BIOTENE DRY MOUTHWASH) liquid 30 mL     senna-docusate (SENOKOT-S;PERICOLACE) 8.6-50 MG per tablet 2 tablet     artificial saliva (BIOTENE MT) solution 2 spray     pneumococcal vaccine (PNEUMOVAX 23-edna) injection 0.5 mL     nicotine Patch in Place     nicotine patch REMOVAL     nicotine (NICODERM CQ) 14 MG/24HR 24 hr patch 1 patch     dextrose 10 % 1,000 mL infusion      multivitamins with minerals (CERTAVITE/CEROVITE) liquid 15 mL     naloxone (NARCAN) injection 0.1-0.4 mg

## 2017-03-11 NOTE — PLAN OF CARE
Problem: Goal Outcome Summary  Goal: Goal Outcome Summary  Outcome: No Change  Patient appeared to sleep well. NO sign  Of seritonin syndrome. Tolerating Tube feeding

## 2017-03-11 NOTE — PLAN OF CARE
Problem: Pancreatitis, Acute/Chronic (Adult)  Goal: Signs and Symptoms of Listed Potential Problems Will be Absent or Manageable (Pancreatitis, Acute/Chronic)  Signs and symptoms of listed potential problems will be absent or manageable by discharge/transition of care (reference Pancreatitis, Acute/Chronic (Adult) CPG).   Outcome: No Change  AVSS. Afebrile. TF running @ 115/hr. A&Ox4. No appetite. Denies N/V and pain. Had a few visitors today.  Continue to monitor and with POC.

## 2017-03-11 NOTE — PROGRESS NOTES
SPIRITUAL HEALTH SERVICES  [facility] ([place] 7D  ON-CALL VISIT     REFERRAL SOURCE: referral was from pt's bedside nurse Christi Chang - pt had not previously requested  support     Per nurse, pt has been here for quite some time and would benefit by having someone to help him process feelings and coping strategies. Pt is open at this time to  visit.     Unit  gone today; will need to triage as on-call  for a visit on the weekend.      PLAN: refer to Saturday on-call  for visit.     Russel Jacobo M.Div (Bill)., [facility]  Staff   Pager [phone]

## 2017-03-11 NOTE — PROGRESS NOTES
SPIRITUAL HEALTH SERVICES  SPIRITUAL ASSESSMENT Progress Note  [facility] ([place] [id number]   ON-CALL VISIT    REFERRAL SOURCE: Chaplain Camacho for triaged on Sat 3/11.    Outcome:  Attempted twice to see Jayson wife seems non-respective for visit.    PLAN: [facility]S will be available for Jayson for the duration of his stay in the hospital.    Cain Chiu  Chaplain Resident  Pager [phone]

## 2017-03-12 LAB
ERYTHROCYTE [DISTWIDTH] IN BLOOD BY AUTOMATED COUNT: 17.6 % (ref 10–15)
HCT VFR BLD AUTO: 22.4 % (ref 40–53)
HGB BLD-MCNC: 7.1 G/DL (ref 13.3–17.7)
MCH RBC QN AUTO: 25.7 PG (ref 26.5–33)
MCHC RBC AUTO-ENTMCNC: 31.7 G/DL (ref 31.5–36.5)
MCV RBC AUTO: 81 FL (ref 78–100)
PLATELET # BLD AUTO: 526 10E9/L (ref 150–450)
RBC # BLD AUTO: 2.76 10E12/L (ref 4.4–5.9)
WBC # BLD AUTO: 10.2 10E9/L (ref 4–11)

## 2017-03-12 PROCEDURE — 25000132 ZZH RX MED GY IP 250 OP 250 PS 637: Performed by: INTERNAL MEDICINE

## 2017-03-12 PROCEDURE — 25000128 H RX IP 250 OP 636: Performed by: STUDENT IN AN ORGANIZED HEALTH CARE EDUCATION/TRAINING PROGRAM

## 2017-03-12 PROCEDURE — 25000132 ZZH RX MED GY IP 250 OP 250 PS 637: Performed by: STUDENT IN AN ORGANIZED HEALTH CARE EDUCATION/TRAINING PROGRAM

## 2017-03-12 PROCEDURE — 12000008 ZZH R&B INTERMEDIATE UMMC

## 2017-03-12 PROCEDURE — 99233 SBSQ HOSP IP/OBS HIGH 50: CPT | Mod: GC | Performed by: INTERNAL MEDICINE

## 2017-03-12 PROCEDURE — 85027 COMPLETE CBC AUTOMATED: CPT | Performed by: STUDENT IN AN ORGANIZED HEALTH CARE EDUCATION/TRAINING PROGRAM

## 2017-03-12 PROCEDURE — 36415 COLL VENOUS BLD VENIPUNCTURE: CPT | Performed by: STUDENT IN AN ORGANIZED HEALTH CARE EDUCATION/TRAINING PROGRAM

## 2017-03-12 PROCEDURE — 27210436 ZZH NUTRITION PRODUCT SEMIELEM INTERMED CAN

## 2017-03-12 RX ADMIN — MEROPENEM 1 G: 1 INJECTION, POWDER, FOR SOLUTION INTRAVENOUS at 16:27

## 2017-03-12 RX ADMIN — SODIUM BICARBONATE 325 MG: 325 TABLET ORAL at 00:26

## 2017-03-12 RX ADMIN — PANCRELIPASE 72000 UNITS: 24000; 76000; 120000 CAPSULE, DELAYED RELEASE PELLETS ORAL at 00:26

## 2017-03-12 RX ADMIN — NICOTINE 1 PATCH: 14 PATCH, EXTENDED RELEASE TRANSDERMAL at 08:46

## 2017-03-12 RX ADMIN — PANCRELIPASE 72000 UNITS: 24000; 76000; 120000 CAPSULE, DELAYED RELEASE PELLETS ORAL at 05:01

## 2017-03-12 RX ADMIN — MELATONIN TAB 3 MG 3 MG: 3 TAB at 20:16

## 2017-03-12 RX ADMIN — MULTIVITAMIN 15 ML: LIQUID ORAL at 16:28

## 2017-03-12 RX ADMIN — SODIUM BICARBONATE 325 MG: 325 TABLET ORAL at 05:01

## 2017-03-12 RX ADMIN — MEROPENEM 1 G: 1 INJECTION, POWDER, FOR SOLUTION INTRAVENOUS at 08:41

## 2017-03-12 RX ADMIN — PANCRELIPASE 72000 UNITS: 24000; 76000; 120000 CAPSULE, DELAYED RELEASE PELLETS ORAL at 16:27

## 2017-03-12 RX ADMIN — SODIUM BICARBONATE 325 MG: 325 TABLET ORAL at 20:16

## 2017-03-12 RX ADMIN — ACETAMINOPHEN 1000 MG: 325 TABLET, FILM COATED ORAL at 20:27

## 2017-03-12 RX ADMIN — PANTOPRAZOLE SODIUM 40 MG: 40 TABLET, DELAYED RELEASE ORAL at 16:27

## 2017-03-12 RX ADMIN — LINEZOLID 600 MG: 600 TABLET, FILM COATED ORAL at 08:44

## 2017-03-12 RX ADMIN — PANTOPRAZOLE SODIUM 40 MG: 40 TABLET, DELAYED RELEASE ORAL at 08:46

## 2017-03-12 RX ADMIN — PANCRELIPASE 72000 UNITS: 24000; 76000; 120000 CAPSULE, DELAYED RELEASE PELLETS ORAL at 20:15

## 2017-03-12 RX ADMIN — MEROPENEM 1 G: 1 INJECTION, POWDER, FOR SOLUTION INTRAVENOUS at 00:22

## 2017-03-12 RX ADMIN — LINEZOLID 600 MG: 600 TABLET, FILM COATED ORAL at 20:16

## 2017-03-12 RX ADMIN — SODIUM BICARBONATE 325 MG: 325 TABLET ORAL at 16:27

## 2017-03-12 RX ADMIN — FLUCONAZOLE 200 MG: 200 TABLET ORAL at 08:44

## 2017-03-12 RX ADMIN — MIRTAZAPINE 7.5 MG: 7.5 TABLET, FILM COATED ORAL at 21:00

## 2017-03-12 RX ADMIN — ACETAMINOPHEN 1000 MG: 325 TABLET, FILM COATED ORAL at 01:06

## 2017-03-12 RX ADMIN — ACETAMINOPHEN 1000 MG: 325 TABLET, FILM COATED ORAL at 08:47

## 2017-03-12 ASSESSMENT — PAIN DESCRIPTION - DESCRIPTORS
DESCRIPTORS: ACHING
DESCRIPTORS: ACHING

## 2017-03-12 NOTE — PLAN OF CARE
Problem: Goal Outcome Summary  Goal: Goal Outcome Summary  PT / 7D: Cancel. Upon check-in, pt OOR. Will attempt later, otherwise reschedule for 3/13.

## 2017-03-12 NOTE — PLAN OF CARE
Problem: Pancreatitis, Acute/Chronic (Adult)  Goal: Signs and Symptoms of Listed Potential Problems Will be Absent or Manageable (Pancreatitis, Acute/Chronic)  Signs and symptoms of listed potential problems will be absent or manageable by discharge/transition of care (reference Pancreatitis, Acute/Chronic (Adult) CPG).   Outcome: No Change  AVSS. Afebrile. A&Ox4.  PRN tylenol given x1 for HA with some relief. TF running.  Denies N/V. Continue to monitor and with PCO.

## 2017-03-12 NOTE — PROGRESS NOTES
[facility]     Medicine Progress Note  Jayson Bella MRN: [id number]  1970  Date of Admission:2/25/2017  Date of Service: 03/12/2017  ___________________________________    Main Plans for Today   - Plan for Abd CT 3/14 and necrosectomy 3/15  - No changes today    Assessment & Plan    Jayson Bella is a 46 year old White male with a past medical history significant for gallstone pancreatitis s/p cholecystectomy in 12/2016 c/b biliary obstruction d/t large pseudocyst s/p ERCP with stent placement 1/17 who was transferred from OSH with sepsis, fever, leukocytosis concerning for necrotizing pancreatitis and abscess formation. Patient is clinically improving, but will require further necrosectomy.     # Necrotizing pancreatitis c/b infected summer-pancreatic walled off necrosis and loculated peritoneal fluid collections  - Continue PO linezolid 600mg q12  - Fluconazole 200mg po qday  - Continue meropenem  - Monitor for fever, hemodynamic instability  - Daily CBCs  - Per GI, repeat abd CT 3/14 with IV contrast and plan for necrosectomy 3/15 (NPO at midnight)    # Acute GI bleed, resolved  # Hematemesis, resolved  Patient had episode of hematemesis concerning for GI bleed s/p EUS, EGD and necrosectomy 3/7/17.  - GI following  - Protonix bid  - Daily Hgb     # Hypervolemia- improved.  Ascites likely secondary to IVF in setting of sepsis and hypoalbuminemia  - recent weight of 106 kg on 2/13  - strict I&O  - Daily weights  - Continue furosemide 40 mg PO every other day  - K/M repletion protocols with goal 4/2     # Malnutrition  - Continue tube feeds  - Nutrition consult   - Creon     # L sided exudative pleural effusion - chest tube removed 2/26. Resolved.  No growth from pleural effusion cultures from OSH.  - chest tube removed 2/26, f/u xr without evidence of pneumo     # Pain  # Insomnia  - Acetaminophen 500 mg q6h  - Melatonin 3 mg Qevening scheduled    # Depression, low mood  - Start mirtazapine 7.5mg   -   consult   ------------------------------------------------------------------------------------------------------------------  Prophylaxis:     -GI: PPI bid    -DVT: SCDs    FEN: Tube feeds  Code status: Full  Disposition: Pending clinical stability and improvement  =========================================================  Patient was discussed and evaluated with Dr. Yovany Rivera MD  Internal Medicine Resident, PGY 1  Pager [phone]    Interval History   Patient was out walking the halls today. He denies any pain. He is a little tired, but slept better last night. He has no new complaints.    Review of Systems   ROS:  6 point ROS including Respiratory, CV, GI and , other than that noted in the HPI, is negative.  Physical Exam   Vital Signs with Ranges  Temp:  [96.6  F (35.9  C)-97.9  F (36.6  C)] 97.5  F (36.4  C)  Pulse:  [82] 82  Heart Rate:  [83-95] 95  Resp:  [16-18] 18  BP: (136-157)/(85-94) 144/89  SpO2:  [96 %-98 %] 96 %  I/O last 3 completed shifts:  In: 2361 [P.O.:11; I.V.:100; NG/GT:180]  Out: -   Wt Readings from Last 1 Encounters:   03/11/17 112.3 kg (247 lb 9.2 oz)    Body mass index is 30.94 kg/(m^2). Resp: 18    General: Walking the halls, standing up, alert and oriented.  Eyes: Conjunctiva clear.  HEENT: NJ in place. Oropharynx is clear and moist.   Cardiovascular: Regular rate and rhythm, normal S1 and S2, and no murmur noted.   Respiratory: Clear to auscultation bilaterally.   GI: Soft, non-tender, non-distended.  Genitourinary: Deferred  Musculoskeletal: No edema.  Skin: Warm and dry, no rashes.   Neurologic:  Cranial nerves are grossly intact. No focal deficits.    Intake/Output Summary (Last 24 hours) at 03/07/17 1712  Last data filed at 03/07/17 1603   Gross per 24 hour   Intake             2130 ml   Output             6455 ml   Net            -4325 ml     Data   Lines/Tubes:   Peripheral IV 03/04/17 Right;Posterior Lower forearm (Active)   Site Assessment [facility]  3/7/2017  4:01 PM   Line Status Saline locked 3/7/2017  4:01 PM   Phlebitis Scale 0-->no symptoms 3/7/2017  4:01 PM   Infiltration Scale 0 3/7/2017  4:01 PM   Extravasation? No 3/7/2017  8:00 AM   Number of days:3       Peripheral IV 03/07/17 Right;Anterior;Distal Lower forearm (Active)   Site Assessment [facility] 3/7/2017  4:01 PM   Line Status Infusing 3/7/2017  4:01 PM   Phlebitis Scale 0-->no symptoms 3/7/2017  4:01 PM   Infiltration Scale 0 3/7/2017  4:01 PM   Number of days:0       Peripheral IV 03/07/17 Left Lower forearm (Active)   Site Assessment [facility] 3/7/2017  4:01 PM   Line Status Saline locked 3/7/2017  4:01 PM   Phlebitis Scale 0-->no symptoms 3/7/2017  4:01 PM   Infiltration Scale 0 3/7/2017  4:01 PM   Number of days:0     Labs & Studies of Note: I personally reviewed the following studies.    ROUTINE ICU LABS (Last four results)  CMP    Recent Labs  Lab 03/10/17  0552 03/09/17  0638 03/08/17  0703 03/07/17  0617 03/06/17  0608    138 141 141 138   POTASSIUM 3.7 3.7 3.7 4.2 3.9   CHLORIDE 106 104 104 104 102   CO2 26 27 26 30 30   ANIONGAP 9 8 11 8 5   * 101* 98 107* 108*   BUN 26 28 32* 33* 31*   CR 0.82 0.91 0.98 0.93 0.98   GFRESTIMATED >90Non  GFR Calc 90 82 88 83   GFRESTBLACK >90African American GFR Calc >90African American GFR Calc >90African American GFR Calc >90African American GFR Calc >90African American GFR Calc   YAN 8.0* 8.1* 7.7* 7.8* 7.7*   MAG  --   --   --  2.1 2.0   PHOS 2.4*  --   --   --   --      CBC    Recent Labs  Lab 03/12/17  0606 03/11/17  0530 03/10/17  0552 03/09/17  0638   WBC 10.2 10.3 9.6 10.9   RBC 2.76* 2.81* 2.92* 2.89*   HGB 7.1* 7.2* 7.5* 7.4*   HCT 22.4* 23.0* 23.9* 23.5*   MCV 81 82 82 81   MCH 25.7* 25.6* 25.7* 25.6*   MCHC 31.7 31.3* 31.4* 31.5   RDW 17.6* 17.5* 17.5* 17.3*   * 563* 579* 593*       Microbiology:  Culture Micro   Date Value Ref Range Status   03/03/2017 (A)  Final    On day 3, isolated in broth only: Staphylococcus  epidermidis not isolated or   reported on routine culture  No anaerobes isolated     03/03/2017 (A)  Final    Light growth Candida albicans / dubliniensis Candida albicans and Candida   dubliniensis are not routinely speciated  Susceptibility testing not routinely done  Susceptibility testing requested by Yared Tucker, on 3/5/2017 to routine panel   including micafungin     03/02/2017 (A)  Final    Heavy growth Enterococcus faecium (VRE)  Critical Value/Significant Value called to and read back by FRANSISCA BARRERA RN   (6B).  03.04.17 0011 GJS     03/02/2017 No anaerobes isolated  Final   03/02/2017 Culture negative after 1 week  Final   02/26/2017 No growth  Final   02/26/2017 Canceled, Test credited Duplicate request  Final   02/26/2017 Canceled, Test credited Duplicate request  Final   02/25/2017 No growth  Final   02/25/2017 No growth  Final     Recent Labs   Lab Test  03/03/17   0936  03/02/17   1515  02/26/17   1936   CULT  On day 3, isolated in broth only: Staphylococcus epidermidis not isolated or   reported on routine culture  No anaerobes isolated  *  Light growth Candida albicans / dubliniensis Candida albicans and Candida   dubliniensis are not routinely speciated  Susceptibility testing not routinely done  Susceptibility testing requested by Yared Tucker, on 3/5/2017 to routine panel   including micafungin  *  Culture negative after 1 week  No anaerobes isolated  Heavy growth Enterococcus faecium (VRE)  Critical Value/Significant Value called to and read back by FRANSISCA BARRERA RN   (6B).  03.04.17 0011 GJS  *  No growth   SDES  Fluid Pancreatic necrosis  Fluid Pancreatic necrosis  Paracentesis Fluid  Paracentesis Fluid  Paracentesis Fluid  Paracentesis Fluid  Catheterized Urine     Medications   Medications list for Reference (delete if desired)  Current Facility-Administered Medications   Medication     pantoprazole (PROTONIX) EC tablet 40 mg     mirtazapine (REMERON) tablet TABS 7.5 mg      furosemide (LASIX) tablet 40 mg     melatonin tablet 3 mg     fluconazole (DIFLUCAN) tablet 200 mg     linezolid (ZYVOX) tablet 600 mg     acetaminophen (TYLENOL) tablet 1,000 mg     sodium chloride (PF) 0.9% PF flush 3 mL     amylase-lipase-protease (CREON 24) 44236 UNITS per EC capsule 72,000 Units    And     sodium bicarbonate tablet 325 mg     meropenem (MERREM) 1 g vial to attach to  mL bag     lidocaine 1 % 1 mL     lidocaine (LMX4) kit     sodium chloride (PF) 0.9% PF flush 3 mL     sodium chloride (PF) 0.9% PF flush 3 mL     May continue current IV fluids if patient has IV fluids infusing.     May take regular AM medications except those listed below     sodium chloride (PF) 0.9% PF flush 3 mL     potassium chloride SA (K-DUR/KLOR-CON M) CR tablet 20-40 mEq     potassium chloride (KLOR-CON) Packet 20-40 mEq     potassium chloride 10 mEq in 100 mL intermittent infusion     potassium chloride 10 mEq in 100 mL intermittent infusion with 10 mg lidocaine     potassium chloride 20 mEq in 50 mL intermittent infusion     magnesium sulfate 2 g in NS intermittent infusion (PharMEDium or FV Cmpd)     magnesium sulfate 4 g in 100 mL sterile water (premade)     magnesium hydroxide (MILK OF MAGNESIA) suspension 30 mL     artificial saliva (BIOTENE DRY MOUTHWASH) liquid 30 mL     senna-docusate (SENOKOT-S;PERICOLACE) 8.6-50 MG per tablet 2 tablet     artificial saliva (BIOTENE MT) solution 2 spray     pneumococcal vaccine (PNEUMOVAX 23-edna) injection 0.5 mL     nicotine Patch in Place     nicotine patch REMOVAL     nicotine (NICODERM CQ) 14 MG/24HR 24 hr patch 1 patch     dextrose 10 % 1,000 mL infusion     multivitamins with minerals (CERTAVITE/CEROVITE) liquid 15 mL     naloxone (NARCAN) injection 0.1-0.4 mg

## 2017-03-12 NOTE — PLAN OF CARE
Problem: Pancreatitis, Acute/Chronic (Adult)  Goal: Signs and Symptoms of Listed Potential Problems Will be Absent or Manageable (Pancreatitis, Acute/Chronic)  Signs and symptoms of listed potential problems will be absent or manageable by discharge/transition of care (reference Pancreatitis, Acute/Chronic (Adult) CPG).   Outcome: No Change  Had a small loose stool so Senna held. Primary team will talk to the dietician tomorrow to see if fiber can be added to his tube feedings. Tylenol for left reib pain with relief. Appetite still poor.

## 2017-03-12 NOTE — PLAN OF CARE
Problem: Goal Outcome Summary  Goal: Goal Outcome Summary  Outcome: No Change  Pt had uneventful evening- appears to have slept well.Tolerating tube feeding. Tylenol for headache. Voiding into toilet with stool- unmeasurable.

## 2017-03-13 ENCOUNTER — APPOINTMENT (OUTPATIENT)
Dept: PHYSICAL THERAPY | Facility: CLINIC | Age: 47
DRG: 871 | End: 2017-03-13
Attending: INTERNAL MEDICINE

## 2017-03-13 ENCOUNTER — APPOINTMENT (OUTPATIENT)
Dept: OCCUPATIONAL THERAPY | Facility: CLINIC | Age: 47
DRG: 871 | End: 2017-03-13
Attending: INTERNAL MEDICINE

## 2017-03-13 LAB
ALBUMIN SERPL-MCNC: 2 G/DL (ref 3.4–5)
ALP SERPL-CCNC: 85 U/L (ref 40–150)
ALT SERPL W P-5'-P-CCNC: 13 U/L (ref 0–70)
AST SERPL W P-5'-P-CCNC: 14 U/L (ref 0–45)
BILIRUB DIRECT SERPL-MCNC: <0.1 MG/DL (ref 0–0.2)
BILIRUB SERPL-MCNC: 0.2 MG/DL (ref 0.2–1.3)
ERYTHROCYTE [DISTWIDTH] IN BLOOD BY AUTOMATED COUNT: 17.6 % (ref 10–15)
HCT VFR BLD AUTO: 24 % (ref 40–53)
HGB BLD-MCNC: 7.5 G/DL (ref 13.3–17.7)
MCH RBC QN AUTO: 25.5 PG (ref 26.5–33)
MCHC RBC AUTO-ENTMCNC: 31.3 G/DL (ref 31.5–36.5)
MCV RBC AUTO: 82 FL (ref 78–100)
PLATELET # BLD AUTO: 563 10E9/L (ref 150–450)
PROT SERPL-MCNC: 7.4 G/DL (ref 6.8–8.8)
RBC # BLD AUTO: 2.94 10E12/L (ref 4.4–5.9)
WBC # BLD AUTO: 11.3 10E9/L (ref 4–11)

## 2017-03-13 PROCEDURE — 25000132 ZZH RX MED GY IP 250 OP 250 PS 637: Performed by: INTERNAL MEDICINE

## 2017-03-13 PROCEDURE — 25000132 ZZH RX MED GY IP 250 OP 250 PS 637: Performed by: STUDENT IN AN ORGANIZED HEALTH CARE EDUCATION/TRAINING PROGRAM

## 2017-03-13 PROCEDURE — 25000128 H RX IP 250 OP 636: Performed by: STUDENT IN AN ORGANIZED HEALTH CARE EDUCATION/TRAINING PROGRAM

## 2017-03-13 PROCEDURE — 85027 COMPLETE CBC AUTOMATED: CPT | Performed by: STUDENT IN AN ORGANIZED HEALTH CARE EDUCATION/TRAINING PROGRAM

## 2017-03-13 PROCEDURE — 40000133 ZZH STATISTIC OT WARD VISIT: Performed by: OCCUPATIONAL THERAPIST

## 2017-03-13 PROCEDURE — 97110 THERAPEUTIC EXERCISES: CPT | Mod: GP | Performed by: PHYSICAL THERAPIST

## 2017-03-13 PROCEDURE — 12000008 ZZH R&B INTERMEDIATE UMMC

## 2017-03-13 PROCEDURE — 80076 HEPATIC FUNCTION PANEL: CPT | Performed by: STUDENT IN AN ORGANIZED HEALTH CARE EDUCATION/TRAINING PROGRAM

## 2017-03-13 PROCEDURE — 97535 SELF CARE MNGMENT TRAINING: CPT | Mod: GO | Performed by: OCCUPATIONAL THERAPIST

## 2017-03-13 PROCEDURE — 97116 GAIT TRAINING THERAPY: CPT | Mod: GP | Performed by: PHYSICAL THERAPIST

## 2017-03-13 PROCEDURE — 27210436 ZZH NUTRITION PRODUCT SEMIELEM INTERMED CAN

## 2017-03-13 PROCEDURE — 36415 COLL VENOUS BLD VENIPUNCTURE: CPT | Performed by: STUDENT IN AN ORGANIZED HEALTH CARE EDUCATION/TRAINING PROGRAM

## 2017-03-13 PROCEDURE — 40000193 ZZH STATISTIC PT WARD VISIT: Performed by: PHYSICAL THERAPIST

## 2017-03-13 PROCEDURE — 99233 SBSQ HOSP IP/OBS HIGH 50: CPT | Mod: GC | Performed by: INTERNAL MEDICINE

## 2017-03-13 PROCEDURE — 80076 HEPATIC FUNCTION PANEL: CPT | Performed by: PHYSICIAN ASSISTANT

## 2017-03-13 RX ORDER — AMOXICILLIN 250 MG
2 CAPSULE ORAL 2 TIMES DAILY PRN
Status: DISCONTINUED | OUTPATIENT
Start: 2017-03-13 | End: 2017-03-18 | Stop reason: HOSPADM

## 2017-03-13 RX ORDER — GUAR GUM
1 PACKET (EA) ORAL 3 TIMES DAILY
Status: DISCONTINUED | OUTPATIENT
Start: 2017-03-13 | End: 2017-03-16

## 2017-03-13 RX ADMIN — PANCRELIPASE 72000 UNITS: 24000; 76000; 120000 CAPSULE, DELAYED RELEASE PELLETS ORAL at 20:12

## 2017-03-13 RX ADMIN — MIRTAZAPINE 7.5 MG: 7.5 TABLET, FILM COATED ORAL at 21:11

## 2017-03-13 RX ADMIN — LINEZOLID 600 MG: 600 TABLET, FILM COATED ORAL at 20:12

## 2017-03-13 RX ADMIN — FLUCONAZOLE 200 MG: 200 TABLET ORAL at 09:01

## 2017-03-13 RX ADMIN — MEROPENEM 1 G: 1 INJECTION, POWDER, FOR SOLUTION INTRAVENOUS at 01:08

## 2017-03-13 RX ADMIN — PANTOPRAZOLE SODIUM 40 MG: 40 TABLET, DELAYED RELEASE ORAL at 09:01

## 2017-03-13 RX ADMIN — PANCRELIPASE 72000 UNITS: 24000; 76000; 120000 CAPSULE, DELAYED RELEASE PELLETS ORAL at 04:30

## 2017-03-13 RX ADMIN — MELATONIN TAB 3 MG 3 MG: 3 TAB at 21:11

## 2017-03-13 RX ADMIN — SODIUM BICARBONATE 325 MG: 325 TABLET ORAL at 01:07

## 2017-03-13 RX ADMIN — Medication 1 PACKET: at 14:03

## 2017-03-13 RX ADMIN — SODIUM BICARBONATE 325 MG: 325 TABLET ORAL at 20:12

## 2017-03-13 RX ADMIN — LINEZOLID 600 MG: 600 TABLET, FILM COATED ORAL at 09:00

## 2017-03-13 RX ADMIN — PANCRELIPASE 72000 UNITS: 24000; 76000; 120000 CAPSULE, DELAYED RELEASE PELLETS ORAL at 16:49

## 2017-03-13 RX ADMIN — MEROPENEM 1 G: 1 INJECTION, POWDER, FOR SOLUTION INTRAVENOUS at 16:49

## 2017-03-13 RX ADMIN — SODIUM BICARBONATE 325 MG: 325 TABLET ORAL at 16:49

## 2017-03-13 RX ADMIN — ACETAMINOPHEN 650 MG: 325 TABLET, FILM COATED ORAL at 21:11

## 2017-03-13 RX ADMIN — PANCRELIPASE 72000 UNITS: 24000; 76000; 120000 CAPSULE, DELAYED RELEASE PELLETS ORAL at 01:07

## 2017-03-13 RX ADMIN — SODIUM BICARBONATE 325 MG: 325 TABLET ORAL at 04:30

## 2017-03-13 RX ADMIN — MEROPENEM 1 G: 1 INJECTION, POWDER, FOR SOLUTION INTRAVENOUS at 08:56

## 2017-03-13 RX ADMIN — MULTIVITAMIN 15 ML: LIQUID ORAL at 16:49

## 2017-03-13 RX ADMIN — Medication 1 PACKET: at 20:13

## 2017-03-13 RX ADMIN — NICOTINE 1 PATCH: 14 PATCH, EXTENDED RELEASE TRANSDERMAL at 09:00

## 2017-03-13 RX ADMIN — PANTOPRAZOLE SODIUM 40 MG: 40 TABLET, DELAYED RELEASE ORAL at 16:49

## 2017-03-13 ASSESSMENT — PAIN DESCRIPTION - DESCRIPTORS: DESCRIPTORS: ACHING

## 2017-03-13 NOTE — PLAN OF CARE
Problem: Pancreatitis, Acute/Chronic (Adult)  Goal: Signs and Symptoms of Listed Potential Problems Will be Absent or Manageable (Pancreatitis, Acute/Chronic)  Signs and symptoms of listed potential problems will be absent or manageable by discharge/transition of care (reference Pancreatitis, Acute/Chronic (Adult) CPG).   Outcome: No Change  AVSS. Afebrile. C/O left rib pain, PRN Tylenol given with relief. TF running @ 115, tolerating well. Denies N/V. Continue to monitor and with POC.

## 2017-03-13 NOTE — PLAN OF CARE
Problem: Goal Outcome Summary  Goal: Goal Outcome Summary  Outcome: No Change  AVSS ,Denies pain- Declined tylenol. Tube feeding at 115 and hour. Tolerating TF.. Re minded patient of need for careful ins and outs.

## 2017-03-13 NOTE — DISCHARGE SUMMARY
Medicine Discharge Summary  Jayson Bella MRN: [id number]  1970  Home clinic: [facility]  Primary care provider: Olga Cross  ___________________________________          Date of Admission:  2/25/2017  Date of Discharge:  3/18/2017  Admitting Physician:  Avelino Cm MD  Discharge Physician:  Belia Bell MD  Discharging Service:  Internal Medicine, [place] [address]     Primary Provider: Olga Cross         Reason for Admission:   Sepsis secondary to necrotizing pancreatitis          Discharge Diagnosis:   - Sepsis secondary to necrotizing pancreatitis c/b acute necrotic collections  - Peritonitis with VRE and yeast  - Acute GI bleed  - Splenic artery pseudoaneurysm  - Depression  - Acute kidney injury  - Malnutrition         Procedures & Significant Findings:   Splenic artery pseudoaneurysm embolization 3/15/2017  EUS and EGD 3/7/2017  Necrosectomy 3/7/2017  Chest tube removed 2/26/2017    Ct Abd/Pelvis 3/14/2016  IMPRESSION:   1. Following removal on an axios stent and placement of 2 double  pigtail cystogastrostomy stents since 3/7/2016, slightly decreased  size of multifocal acute necrotic collections/walled off necrosis  along the anterior aspect of the pancreatic neck, body, and tail.  2. New 10 mm hyperdense focus along the anterior aspect of the  collection in the splenic hilum suspicious for small pseudoaneurysm.  3. Overall no significant change in loculated ascites extending  throughout the abdomen.  4. Slightly decreased moderate left pleural effusion and adjacent  compressive atelectasis.    Cultures  3/3/2017 Pancreatic fluid culture: Candida albicans  3/2/2017 Paracentesis fluid culture: Enterococcus faecium (VRE)          Consultations:   Gastroenterology  Infectious Disease  Physical Therapy  Occupational Therapy  Nutrition     Brief HPI:  Adapted from H&P on 2/25/2017.  Please refer to it for further  "details.    Jayson Bella is a 46 year old male with PMH of gallstone pancreatitis s/p cholecystectomy on 12/18/2016, complicated by pseudocyst compressing bile duct s/p ERCP on 1/11/2017 who presents from OSH with sepsis 2/2 necrotizing pancreatitis.     History gathered from chart review and patient interview with wife and sister present. Patient originally presented to OSH with acute pancreatitis on 12/13/16 and was conservatively managed and discharged. He then presented back to hospital with jaundice in 1/17 due to pseudocyst compressing bile duct s/p ERCP. Per wife, since January, patient has had continued pain and anorexia. Patient was most recently hospitalized on 2/14/16 after presenting to outside GI clinic with generalized weakness and abdominal pain. Patient was started on meropenem while inpatient, but clinically deteriorated on 2/23 with increasing WBC and fever. Paracentesis showed \"evidence of peritonitis.\" Patient was transferred to [facility] for ERCP vs IR drainage of intraabdominal fluid.           Hospital Course by Problem:    1. Necrotizing pancreatitis c/b necrotic fluid collections- Patient was found to have sepsis secondary to necrotizing pancreatitis. He previously had gallstone pancreatitis s/p cholecystectomy in 12/2016 complicated by biliary obstruction due to large pseudocyst s/p ERCP with stent placement 1/2017. Patient was started on meropenem and vancomycin. Patient completed a course of empiric antibiotics and clinically worsened immediately after finishing treatment course. Pancreatic fluid culture grew candida albicans and peritoneal fluid grew VRE. Patient was treated with meropenem, micafungin, and linezolid which was eventually changed to fluconazole, linezolid, flagyl, and ciprofloxacin once the patient was stable after necrosectomy. Patient had necrosetomy 3/7/2017 with plans to return to the hospital for subsequent necrosectomies. Patient was followed closely by gastroenterology " and infectious disease.    2. Malnutrition- At time of admission, patient had lost approximately 71 pounds total since his illness began. He had significant difficulty tolerating oral intake. Nutrition was consulted. Patient was started on tube feeds, which he tolerated well. The NJ tube was removed on 3/14/2017 and the patient slowly advanced his oral intake. He received significant nutritional support during his admission and took in over 2000 calories the day before discharge. He may end up requiring a PEG tube, depending on how oral intake goes over the next week. This will be evaluated when he returns for his second necrosectomy.    3. Acute GI bleed- On 3/7, patient had an episodes of hematemesis. EGD showed clot at base of Axios in the necrotic cavity, but no source of visible bleeding. Patient was continued on pantoprazole twice a day. He had no further episodes of hematemesis.     4. Splenic artery pseudoaneurysm- Repeat CT scan 3/14 showed a new pseudoaneurysm which was treated by IR with embolization. Patient tolerated the procedure well.     5. Depression- Patient developed significant low mood during the course of his long illness. He was seen by psychology and the . He was started on mirtazapine 7.5mg, which resulted in improved sleep.    Timeline (from GI note)  3/1 Off chio developed drenching sweats, rising WBC thus restarted and CT w/IV contrast done. There is a maturing collection inferior to the stomach, partially loculated ascites, recurrent pleural effusion and immature LUQ necrotic collections and inflammation.   3/2 Ascites tap done with ~500 neutrophils and excessively high amylase/lipase c/w pancreatic ascites culture + for VRE.  3/3, Axios and solus placement in lesser sac collection. ERCP with sphincterotomy and biliary stent removal also done.   3/3 WON culture with candida  3/7 hematemesis, had EGD and necrosectomy with clot at base of Axios in the necrotic cavity. No visible  "vessel to treat but this was the source of bleeding. He had old blood in the duodenum as well. Axios removed, double pigtails placed in its absence.   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   3/15 IR embolization of splenic artery pseudoaneurysm.     Physical Exam on day of Discharge:  Blood pressure (!) 147/91, pulse 83, temperature 98.5  F (36.9  C), temperature source Oral, resp. rate 18, height 1.905 m (6' 3\"), weight 101.7 kg (224 lb 3.3 oz), SpO2 97 %.  General: Awake, alert, in NAD. Pleasant.  HEENT: Conjunctiva clear. EOMI. MMM. Nares patent w/o congestion.  Neck: No JVD. No lymphadenopathy.   Respiratory: Clear to auscultation. Good air exchange. No wheezing or crackles.  Heart/CV: Regular rate and rhythm. Normal S1 and S2. No murmur.  Abdomen/GI: Soft, non-tender, non-distended. Normoactive bowel sounds.  : Right groin incision clean, dry, and intact w/o hematoma.   Extremities/MSK: No edema. Warm and well perfused.  Skin:  No rashes. Warm and dry.  Neuro: Alert and oriented x3. CN II-XII grossly intact. No focal deficits.  Psych: Flat affect.         Pending Results:   None         Discharge Medications:     Current Discharge Medication List      START taking these medications    Details   fluconazole (DIFLUCAN) 200 MG tablet Take 1 tablet (200 mg) by mouth daily  Qty: 7 tablet, Refills: 0    Associated Diagnoses: Acute pancreatitis, unspecified complication status, unspecified pancreatitis type      amylase-lipase-protease (CREON 24) 43512 UNITS CPEP per EC capsule Take 3 capsules (72,000 Units) by mouth 3 times daily (with meals)  Qty: 180 capsule, Refills: 3    Associated Diagnoses: Acute pancreatitis, unspecified complication status, unspecified pancreatitis type      ciprofloxacin (CIPRO) 500 MG tablet Take 1 tablet (500 mg) by mouth 2 times daily  Qty: 14 tablet, Refills: 0    Associated Diagnoses: Acute " pancreatitis, unspecified complication status, unspecified pancreatitis type      linezolid (ZYVOX) 600 MG tablet Take 1 tablet (600 mg) by mouth every 12 hours  Qty: 14 tablet, Refills: 0    Associated Diagnoses: Acute pancreatitis, unspecified complication status, unspecified pancreatitis type      metroNIDAZOLE (FLAGYL) 500 MG tablet Take 1 tablet (500 mg) by mouth 3 times daily  Qty: 21 tablet, Refills: 0    Associated Diagnoses: Acute pancreatitis, unspecified complication status, unspecified pancreatitis type      multivitamin, therapeutic with minerals (THERA-VIT-M) TABS tablet Take 1 tablet by mouth daily  Qty: 30 each, Refills: 3    Associated Diagnoses: Acute pancreatitis, unspecified complication status, unspecified pancreatitis type      pantoprazole (PROTONIX) 40 MG EC tablet Take 1 tablet (40 mg) by mouth 2 times daily (before meals)  Qty: 30 tablet, Refills: 1    Associated Diagnoses: Acute pancreatitis, unspecified complication status, unspecified pancreatitis type      acetaminophen (TYLENOL) 500 MG tablet Take 1-2 tablets (500-1,000 mg) by mouth every 6 hours as needed for mild pain or fever  Qty: 30 tablet, Refills: 1    Associated Diagnoses: Acute pancreatitis, unspecified complication status, unspecified pancreatitis type      mirtazapine (REMERON) 7.5 MG TABS tablet Take 1 tablet (7.5 mg) by mouth At Bedtime  Qty: 30 tablet, Refills: 3    Associated Diagnoses: Acute pancreatitis, unspecified complication status, unspecified pancreatitis type      melatonin 3 MG tablet Take 1 tablet (3 mg) by mouth At Bedtime  Qty: 30 tablet, Refills: 1    Associated Diagnoses: Insomnia due to medical condition      ondansetron (ZOFRAN) 4 MG tablet Take 1 tablet (4 mg) by mouth every 6 hours as needed for nausea or vomiting  Qty: 20 tablet, Refills: 1    Associated Diagnoses: Acute pancreatitis, unspecified complication status, unspecified pancreatitis type                  Discharge Instructions and Follow-Up:      Discharge Procedure Orders  Reason for your hospital stay   Order Comments: You were in the hospital for necrotizing pancreatitis.     Activity   Order Comments: Your activity upon discharge: activity as tolerated and no driving for today   Order Specific Question Answer Comments   Is discharge order? Yes      Monitor and record   Order Comments: Please keep a log of your caloric intake.     When to contact your care team   Order Comments: Return to the emergency room if you have fever, chills, pain, vomiting,  bloody stools or vomiting blood.     Adult [facility] Follow-up and recommended labs and tests   Order Comments: Follow up for repeat necrosectomy on Tuesday, March 21.    Appointments on [place] and/or [facility] (with [facility] or [facility] provider or service). Call [phone] if you haven't heard regarding these appointments within 7 days of discharge.     Full Code     Diet   Order Comments: Follow this diet upon discharge: Orders Placed This Encounter     Calorie Counts     Snacks/Supplements Adult: Ensure Clear; Between Meals     Snacks/Supplements Adult: Magic Cup; Between Meals     Low Fat Diet   Order Specific Question Answer Comments   Is discharge order? Yes              Discharge Disposition:   Discharge to home (patient will be staying at [facility] until his next necrosectomy on Tuesday).         Condition on Discharge:   Discharge condition: Stable   Code status on discharge: Full Code        Date of service: 3/18/2017    The patient was discussed with Dr. Ray Rivera MD  [phone]

## 2017-03-13 NOTE — PLAN OF CARE
Problem: Pancreatitis, Acute/Chronic (Adult)  Goal: Signs and Symptoms of Listed Potential Problems Will be Absent or Manageable (Pancreatitis, Acute/Chronic)  Signs and symptoms of listed potential problems will be absent or manageable by discharge/transition of care (reference Pancreatitis, Acute/Chronic (Adult) CPG).   Outcome: No Change  Declined need for pain medications. Up independently. Will have a CT scan tomorrow in anticipation for a necrosectomy on Thursday. Having loose fiber stools so Senna changed to PRN and Fiber supplement started. Fatigued.

## 2017-03-13 NOTE — PLAN OF CARE
Problem: Goal Outcome Summary  Goal: Goal Outcome Summary  Edema 7D: Measurements taken with 12/12 noted reductions bilaterally. Skin intact with generalized 1+ pitting distally. Patient fit with size F comperm compression stockings from MTPs to knee creases for continued edema management. Stockings can be worn 23/24 hours per day and should be removed daily for skin cares; however remove completely if causing pain/numbness.

## 2017-03-13 NOTE — PROGRESS NOTES
[facility]     Medicine Progress Note  Jayson Bella MRN: [id number]  1970  Date of Admission:2/25/2017  Date of Service: 03/13/2017  ___________________________________    Main Plans for Today   - Plan for Abd CT 3/14 and necrosectomy 3/15  - Discharge planning  - WBC mildly increased- will monitor  - Encourage IS use  - Spoke to nutrition about increased fiber in tube feeds    Assessment & Plan    Jayson Bella is a 46 year old White male with a past medical history significant for gallstone pancreatitis s/p cholecystectomy in 12/2016 c/b biliary obstruction d/t large pseudocyst s/p ERCP with stent placement 1/17 who was transferred from OSH with sepsis, fever, leukocytosis concerning for necrotizing pancreatitis and abscess formation. Patient is clinically improving, but will require further necrosectomy.     # Necrotizing pancreatitis c/b infected summer-pancreatic walled off necrosis and loculated peritoneal fluid collections  - Continue PO linezolid 600mg q12  - Fluconazole 200mg po qday  - Continue meropenem  - Monitor for fever, hemodynamic instability  - Daily CBCs  - Per GI, repeat abd CT 3/14 with IV contrast and plan for necrosectomy 3/15 (NPO at midnight)    # Acute GI bleed, resolved  # Hematemesis, resolved  Patient had episode of hematemesis concerning for GI bleed s/p EUS, EGD and necrosectomy 3/7/17.  - GI following  - Protonix bid  - Daily Hgb     # Hypervolemia- improved.  Ascites likely secondary to IVF in setting of sepsis and hypoalbuminemia  - recent weight of 106 kg on 2/13- currently very close to baseline  - strict I&O  - Daily weights  - Dc lasix  - K/M repletion protocols with goal 4/2     # Malnutrition  - Continue tube feeds  - Nutrition consult   - Creon     # L sided exudative pleural effusion - chest tube removed 2/26. Resolved.  No growth from pleural effusion cultures from OSH.  - chest tube removed 2/26, f/u xr without evidence of pneumo     # Pain  # Insomnia  -  Acetaminophen 500 mg q6h  - Melatonin 3 mg Qevening scheduled    # Depression, low mood  - Continue mirtazapine 7.5mg   -  consult   ------------------------------------------------------------------------------------------------------------------  Prophylaxis:     -GI: PPI bid    -DVT: SCDs    FEN: Tube feeds + encourage PO diet  Code status: Full  Disposition: Pending clinical stability and improvement  =========================================================  Patient was discussed and evaluated with Dr. Yovany Rivera MD  Internal Medicine Resident, PGY 1  Pager [phone]    Interval History   Patient is doing well today. He denies any pain. He denies nausea and vomiting. He had a good day yesterday and walked the halls. He is anxious to get out of the hospital. He is having loose bowel movements.    Review of Systems   ROS:  6 point ROS including Respiratory, CV, GI and , other than that noted in the HPI, is negative.  Physical Exam   Vital Signs with Ranges  Temp:  [97.3  F (36.3  C)-98.3  F (36.8  C)] 98.3  F (36.8  C)  Pulse:  [91] 91  Heart Rate:  [82-92] 86  Resp:  [16-20] 18  BP: (128-144)/(82-93) 144/93  SpO2:  [96 %-98 %] 97 %  I/O last 3 completed shifts:  In: 2205 [I.V.:100; NG/GT:150]  Out: -   Wt Readings from Last 1 Encounters:   03/13/17 106.7 kg (235 lb 3.7 oz)    Body mass index is 29.4 kg/(m^2). Resp: 18    General: Sitting in bed, alert, oriented, NAD.  Eyes: Conjunctiva clear.  HEENT: NJ in place. Oropharynx is clear and moist.   Cardiovascular: Regular rate and rhythm, normal S1 and S2, and no murmur noted.   Respiratory: Diminished in left lower base. No wheezing or crackles.  GI: Soft, non-tender, non-distended.  Genitourinary: Deferred  Musculoskeletal: No edema.  Skin: Warm and dry, no rashes.   Neurologic:  Cranial nerves are grossly intact. No focal deficits.    Intake/Output Summary (Last 24 hours) at 03/07/17 1712  Last data filed at 03/07/17 1603   Gross  per 24 hour   Intake             2130 ml   Output             6455 ml   Net            -4325 ml     Data   Lines/Tubes:   Peripheral IV 03/04/17 Right;Posterior Lower forearm (Active)   Site Assessment [facility] 3/7/2017  4:01 PM   Line Status Saline locked 3/7/2017  4:01 PM   Phlebitis Scale 0-->no symptoms 3/7/2017  4:01 PM   Infiltration Scale 0 3/7/2017  4:01 PM   Extravasation? No 3/7/2017  8:00 AM   Number of days:3       Peripheral IV 03/07/17 Right;Anterior;Distal Lower forearm (Active)   Site Assessment [facility] 3/7/2017  4:01 PM   Line Status Infusing 3/7/2017  4:01 PM   Phlebitis Scale 0-->no symptoms 3/7/2017  4:01 PM   Infiltration Scale 0 3/7/2017  4:01 PM   Number of days:0       Peripheral IV 03/07/17 Left Lower forearm (Active)   Site Assessment [facility] 3/7/2017  4:01 PM   Line Status Saline locked 3/7/2017  4:01 PM   Phlebitis Scale 0-->no symptoms 3/7/2017  4:01 PM   Infiltration Scale 0 3/7/2017  4:01 PM   Number of days:0     Labs & Studies of Note: I personally reviewed the following studies.    ROUTINE ICU LABS (Last four results)  CMP    Recent Labs  Lab 03/10/17  0552 03/09/17  0638 03/08/17  0703 03/07/17  0617    138 141 141   POTASSIUM 3.7 3.7 3.7 4.2   CHLORIDE 106 104 104 104   CO2 26 27 26 30   ANIONGAP 9 8 11 8   * 101* 98 107*   BUN 26 28 32* 33*   CR 0.82 0.91 0.98 0.93   GFRESTIMATED >90Non  GFR Calc 90 82 88   GFRESTBLACK >90African American GFR Calc >90African American GFR Calc >90African American GFR Calc >90African American GFR Calc   YAN 8.0* 8.1* 7.7* 7.8*   MAG  --   --   --  2.1   PHOS 2.4*  --   --   --      CBC    Recent Labs  Lab 03/13/17  0530 03/12/17  0606 03/11/17  0530 03/10/17  0552   WBC 11.3* 10.2 10.3 9.6   RBC 2.94* 2.76* 2.81* 2.92*   HGB 7.5* 7.1* 7.2* 7.5*   HCT 24.0* 22.4* 23.0* 23.9*   MCV 82 81 82 82   MCH 25.5* 25.7* 25.6* 25.7*   MCHC 31.3* 31.7 31.3* 31.4*   RDW 17.6* 17.6* 17.5* 17.5*   * 526* 563* 579*        Microbiology:  Culture Micro   Date Value Ref Range Status   03/03/2017 (A)  Final    On day 3, isolated in broth only: Staphylococcus epidermidis not isolated or   reported on routine culture  No anaerobes isolated     03/03/2017 (A)  Final    Light growth Candida albicans / dubliniensis Candida albicans and Candida   dubliniensis are not routinely speciated  Susceptibility testing not routinely done  Susceptibility testing requested by Yared Tucker, on 3/5/2017 to routine panel   including micafungin     03/02/2017 (A)  Final    Heavy growth Enterococcus faecium (VRE)  Critical Value/Significant Value called to and read back by FRANSISCA BARRERA RN   (6B).  03.04.17 0011 GJS     03/02/2017 No anaerobes isolated  Final   03/02/2017 Culture negative after 1 week  Final   02/26/2017 No growth  Final   02/26/2017 Canceled, Test credited Duplicate request  Final   02/26/2017 Canceled, Test credited Duplicate request  Final   02/25/2017 No growth  Final   02/25/2017 No growth  Final     Recent Labs   Lab Test  03/03/17   0936  03/02/17   1515  02/26/17   1936   CULT  On day 3, isolated in broth only: Staphylococcus epidermidis not isolated or   reported on routine culture  No anaerobes isolated  *  Light growth Candida albicans / dubliniensis Candida albicans and Candida   dubliniensis are not routinely speciated  Susceptibility testing not routinely done  Susceptibility testing requested by Yared Tucker, on 3/5/2017 to routine panel   including micafungin  *  Culture negative after 1 week  No anaerobes isolated  Heavy growth Enterococcus faecium (VRE)  Critical Value/Significant Value called to and read back by FRANSISCA BARRERA RN   (6B).  03.04.17 0011 GJS  *  No growth   SDES  Fluid Pancreatic necrosis  Fluid Pancreatic necrosis  Paracentesis Fluid  Paracentesis Fluid  Paracentesis Fluid  Paracentesis Fluid  Catheterized Urine     Medications   Medications list for Reference (delete if desired)  Current  Facility-Administered Medications   Medication     fiber modular (NUTRISOURCE FIBER) packet 1 packet     pantoprazole (PROTONIX) EC tablet 40 mg     mirtazapine (REMERON) tablet TABS 7.5 mg     melatonin tablet 3 mg     fluconazole (DIFLUCAN) tablet 200 mg     linezolid (ZYVOX) tablet 600 mg     acetaminophen (TYLENOL) tablet 1,000 mg     sodium chloride (PF) 0.9% PF flush 3 mL     amylase-lipase-protease (CREON 24) 83196 UNITS per EC capsule 72,000 Units    And     sodium bicarbonate tablet 325 mg     meropenem (MERREM) 1 g vial to attach to  mL bag     lidocaine 1 % 1 mL     lidocaine (LMX4) kit     sodium chloride (PF) 0.9% PF flush 3 mL     sodium chloride (PF) 0.9% PF flush 3 mL     May continue current IV fluids if patient has IV fluids infusing.     May take regular AM medications except those listed below     sodium chloride (PF) 0.9% PF flush 3 mL     potassium chloride SA (K-DUR/KLOR-CON M) CR tablet 20-40 mEq     potassium chloride (KLOR-CON) Packet 20-40 mEq     potassium chloride 10 mEq in 100 mL intermittent infusion     potassium chloride 10 mEq in 100 mL intermittent infusion with 10 mg lidocaine     potassium chloride 20 mEq in 50 mL intermittent infusion     magnesium sulfate 2 g in NS intermittent infusion ([facility]ar[facility] or FV Cmpd)     magnesium sulfate 4 g in 100 mL sterile water (premade)     magnesium hydroxide (MILK OF MAGNESIA) suspension 30 mL     senna-docusate (SENOKOT-S;PERICOLACE) 8.6-50 MG per tablet 2 tablet     pneumococcal vaccine (PNEUMOVAX 23-edna) injection 0.5 mL     nicotine Patch in Place     nicotine patch REMOVAL     nicotine (NICODERM CQ) 14 MG/24HR 24 hr patch 1 patch     dextrose 10 % 1,000 mL infusion     multivitamins with minerals (CERTAVITE/CEROVITE) liquid 15 mL     naloxone (NARCAN) injection 0.1-0.4 mg

## 2017-03-13 NOTE — PROGRESS NOTES
GASTROENTEROLOGY PROGRESS NOTE    Date of Admission: 2/25/2017  Reason for Admission: necrotizing pancreatitis     ASSESSMENT:  46 year old male with past history depression, anxiety, tobacco abuse, past ETOH abuse, recent history significant for presumed gallstone pancreatitis (12/14/2016) s/p ERCP w sphincterotomy with biliary sludge (12/17/17), laparoscopic cholecystectomy (12/18/17), repeat ERCP with biliary stent (7 cm x 10 F ) for elevated LFT (1/12/17) transferred here from [place], [place]) for further management of necrotizing pancreatitis with acute necrotic collections.   Also complicated by acute kidney injury (resolved), ascites and pleural effusions s/p chest tube (now removed). On empiric meropenem since 2/14 (signout from [place] suggested infected ascites though we do not have those records).      3/1 Off merro ondeveloped drenching sweats, rising WBC thus restarted and CT w/IV contrast done. There is a maturing collection inferior to the stomach, partially loculated ascites, recurrent pleural effusion and immature LUQ necrotic collections and inflammation.   3/2 Ascites tap done with ~500 neutrophils and excessively high amylase/lipase c/w pancreatic ascites culture + for VRE.  3/3, Axios and solus placement in lesser sac collection. ERCP with sphincterotomy and stent removal also done.   3/3 WON culture with candida  3/7 hematemesis, had EGD and necrosectomy with clot at base of Axios in the necrotic cavity. No visible vessel to treat but this was the source of bleeding. He had old blood in the duodenum as well. Axios removed, double pigtails placed in its absence.      RECOMMENDATIONS:    Cont antimicrobials per ID (fluconazole, linezolid, merro/erta likely to continue for another 9 days or so)    Repeat Abd CT tomorrow with IV contrast. If there is significant amount of ascites remaining consider re-tapping to ensure no ongoing infection in the ascitic fluid    Necrosectomy scheduled  "3/15. Please keep patient NPO at midnight the evening prior    Continue tube feeds through NJ tube and pancreatic enzymes included.     Low fat diet    PT/OT    Holding anticoagulation    BID PPI    The patient was discussed and plan agreed upon with GI staff, Dr Larry Cadena.      Letha Flores PA-C   Therapeutic Endoscopy/Pancreaticobiliary [facility]  [facility]  Pager *[phone]  _______________________________________________________________      Subjective: Patient seen and examined at 1245. Patient reports that he has minimal abd pain. Trying to take more po but it is difficult with NJ tube in the way. Tolerating tube feeds, no nausea or vomiting.    ROS:   10 pt ROS negative unless noted in subjective.     Objective:  Blood pressure 140/89, pulse 91, temperature 98  F (36.7  C), temperature source Oral, resp. rate 18, height 1.905 m (6' 3\"), weight 106.7 kg (235 lb 3.7 oz), SpO2 97 %.  Gen: NAD  HEENT: NJ tube, anicteric sclera  Abd: Soft, obese, tender diffusely, mainly in upper abdomen L>R  Ext: WWP  Skin: No jaundice        PROCEDURES:  3/3, Axios and solus placement in lesser sac collection. ERCP with sphincterotomy and stent removal also done  3/7 EGD and necrosectomy with clot at base of Axios in the necrotic cavity. Axios removed, replaced .    LABS:  BMP  Recent Labs  Lab 03/10/17  0552 03/09/17  0638 03/08/17  0703 03/07/17  0617    138 141 141   POTASSIUM 3.7 3.7 3.7 4.2   CHLORIDE 106 104 104 104   YAN 8.0* 8.1* 7.7* 7.8*   CO2 26 27 26 30   BUN 26 28 32* 33*   CR 0.82 0.91 0.98 0.93   * 101* 98 107*     CBC  Recent Labs  Lab 03/13/17  0530 03/12/17  0606 03/11/17  0530 03/10/17  0552   WBC 11.3* 10.2 10.3 9.6   RBC 2.94* 2.76* 2.81* 2.92*   HGB 7.5* 7.1* 7.2* 7.5*   HCT 24.0* 22.4* 23.0* 23.9*   MCV 82 81 82 82   MCH 25.5* 25.7* 25.6* 25.7*   MCHC 31.3* 31.7 31.3* 31.4*   RDW 17.6* 17.6* 17.5* 17.5*   * 526* 563* 579*     INR  Recent Labs  Lab " 03/08/17  1159   INR 1.24*       IMAGING:  Reviewed

## 2017-03-13 NOTE — PLAN OF CARE
Problem: Goal Outcome Summary  Goal: Goal Outcome Summary  PT 7D- pt walked without AD and performed balance challenges. Pt is getting stronger and is able to perform all exercise without taking a rest break. Pt still with impaired balance when walking. Pt should be able to return home with assist. Would recommend OP PT at discharge.

## 2017-03-13 NOTE — PROGRESS NOTES
"SPIRITUAL HEALTH SERVICES  SPIRITUAL ASSESSMENT Progress Note  [facility] ([place]) 7D     REFERRAL SOURCE: Follow-up of on-call  visit.    Jayson shared a reflective conversation regarding his current hospitalization. He shared he has been in the hospital for \"weeks\" and is tired of laying around in bed. Jayson is a  who usually works in [employer] and also [employer]. He enjoys his work. He has two kids, aged 7 and 9. Jayson was interested in getting a shower in, will follow up with Jayson about emotional processing of illness narrative at a later date.     PLAN: I will follow up with Jayson as he remains on the unit 1-2x/week.     Belia Jurado  Chaplain Resident  Pager [phone]    "

## 2017-03-14 ENCOUNTER — APPOINTMENT (OUTPATIENT)
Dept: CT IMAGING | Facility: CLINIC | Age: 47
DRG: 871 | End: 2017-03-14
Attending: INTERNAL MEDICINE

## 2017-03-14 LAB
ALBUMIN SERPL-MCNC: 2 G/DL (ref 3.4–5)
ALP SERPL-CCNC: 83 U/L (ref 40–150)
ALT SERPL W P-5'-P-CCNC: 14 U/L (ref 0–70)
ANION GAP SERPL CALCULATED.3IONS-SCNC: 9 MMOL/L (ref 3–14)
AST SERPL W P-5'-P-CCNC: 13 U/L (ref 0–45)
BILIRUB SERPL-MCNC: 0.2 MG/DL (ref 0.2–1.3)
BUN SERPL-MCNC: 29 MG/DL (ref 7–30)
CALCIUM SERPL-MCNC: 8.3 MG/DL (ref 8.5–10.1)
CHLORIDE SERPL-SCNC: 101 MMOL/L (ref 94–109)
CO2 SERPL-SCNC: 27 MMOL/L (ref 20–32)
CREAT SERPL-MCNC: 0.68 MG/DL (ref 0.66–1.25)
ERYTHROCYTE [DISTWIDTH] IN BLOOD BY AUTOMATED COUNT: 17.3 % (ref 10–15)
ERYTHROCYTE [DISTWIDTH] IN BLOOD BY AUTOMATED COUNT: 17.5 % (ref 10–15)
GFR SERPL CREATININE-BSD FRML MDRD: ABNORMAL ML/MIN/1.7M2
GLUCOSE SERPL-MCNC: 98 MG/DL (ref 70–99)
HCT VFR BLD AUTO: 22.9 % (ref 40–53)
HCT VFR BLD AUTO: 23.5 % (ref 40–53)
HGB BLD-MCNC: 7.3 G/DL (ref 13.3–17.7)
HGB BLD-MCNC: 7.5 G/DL (ref 13.3–17.7)
INR PPP: 1.19 (ref 0.86–1.14)
MCH RBC QN AUTO: 25.8 PG (ref 26.5–33)
MCH RBC QN AUTO: 26 PG (ref 26.5–33)
MCHC RBC AUTO-ENTMCNC: 31.9 G/DL (ref 31.5–36.5)
MCHC RBC AUTO-ENTMCNC: 31.9 G/DL (ref 31.5–36.5)
MCV RBC AUTO: 81 FL (ref 78–100)
MCV RBC AUTO: 81 FL (ref 78–100)
PLATELET # BLD AUTO: 447 10E9/L (ref 150–450)
PLATELET # BLD AUTO: 494 10E9/L (ref 150–450)
POTASSIUM SERPL-SCNC: 4 MMOL/L (ref 3.4–5.3)
PROT SERPL-MCNC: 7.4 G/DL (ref 6.8–8.8)
RADIOLOGIST FLAGS: ABNORMAL
RBC # BLD AUTO: 2.83 10E12/L (ref 4.4–5.9)
RBC # BLD AUTO: 2.89 10E12/L (ref 4.4–5.9)
SODIUM SERPL-SCNC: 137 MMOL/L (ref 133–144)
WBC # BLD AUTO: 10 10E9/L (ref 4–11)
WBC # BLD AUTO: 10.5 10E9/L (ref 4–11)

## 2017-03-14 PROCEDURE — 85027 COMPLETE CBC AUTOMATED: CPT | Performed by: STUDENT IN AN ORGANIZED HEALTH CARE EDUCATION/TRAINING PROGRAM

## 2017-03-14 PROCEDURE — 25000132 ZZH RX MED GY IP 250 OP 250 PS 637: Performed by: STUDENT IN AN ORGANIZED HEALTH CARE EDUCATION/TRAINING PROGRAM

## 2017-03-14 PROCEDURE — 25500064 ZZH RX 255 OP 636: Performed by: STUDENT IN AN ORGANIZED HEALTH CARE EDUCATION/TRAINING PROGRAM

## 2017-03-14 PROCEDURE — 85027 COMPLETE CBC AUTOMATED: CPT | Performed by: INTERNAL MEDICINE

## 2017-03-14 PROCEDURE — 25000132 ZZH RX MED GY IP 250 OP 250 PS 637: Performed by: INTERNAL MEDICINE

## 2017-03-14 PROCEDURE — 25000125 ZZHC RX 250: Performed by: STUDENT IN AN ORGANIZED HEALTH CARE EDUCATION/TRAINING PROGRAM

## 2017-03-14 PROCEDURE — 85610 PROTHROMBIN TIME: CPT | Performed by: INTERNAL MEDICINE

## 2017-03-14 PROCEDURE — 99233 SBSQ HOSP IP/OBS HIGH 50: CPT | Mod: GC | Performed by: INTERNAL MEDICINE

## 2017-03-14 PROCEDURE — 12000008 ZZH R&B INTERMEDIATE UMMC

## 2017-03-14 PROCEDURE — 25000128 H RX IP 250 OP 636: Performed by: STUDENT IN AN ORGANIZED HEALTH CARE EDUCATION/TRAINING PROGRAM

## 2017-03-14 PROCEDURE — 74177 CT ABD & PELVIS W/CONTRAST: CPT

## 2017-03-14 PROCEDURE — 36415 COLL VENOUS BLD VENIPUNCTURE: CPT | Performed by: STUDENT IN AN ORGANIZED HEALTH CARE EDUCATION/TRAINING PROGRAM

## 2017-03-14 PROCEDURE — 80053 COMPREHEN METABOLIC PANEL: CPT | Performed by: INTERNAL MEDICINE

## 2017-03-14 PROCEDURE — 36415 COLL VENOUS BLD VENIPUNCTURE: CPT | Performed by: INTERNAL MEDICINE

## 2017-03-14 RX ORDER — ONDANSETRON 4 MG/1
4 TABLET, FILM COATED ORAL EVERY 6 HOURS PRN
Status: DISCONTINUED | OUTPATIENT
Start: 2017-03-14 | End: 2017-03-18 | Stop reason: HOSPADM

## 2017-03-14 RX ORDER — SODIUM CHLORIDE 9 MG/ML
INJECTION, SOLUTION INTRAVENOUS CONTINUOUS
Status: DISCONTINUED | OUTPATIENT
Start: 2017-03-15 | End: 2017-03-15 | Stop reason: HOSPADM

## 2017-03-14 RX ORDER — IOPAMIDOL 755 MG/ML
135 INJECTION, SOLUTION INTRAVASCULAR ONCE
Status: COMPLETED | OUTPATIENT
Start: 2017-03-14 | End: 2017-03-14

## 2017-03-14 RX ORDER — MULTIPLE VITAMINS W/ MINERALS TAB 9MG-400MCG
1 TAB ORAL DAILY
Status: DISCONTINUED | OUTPATIENT
Start: 2017-03-14 | End: 2017-03-18 | Stop reason: HOSPADM

## 2017-03-14 RX ORDER — LIDOCAINE 40 MG/G
CREAM TOPICAL
Status: DISCONTINUED | OUTPATIENT
Start: 2017-03-14 | End: 2017-03-15 | Stop reason: HOSPADM

## 2017-03-14 RX ADMIN — MEROPENEM 1 G: 1 INJECTION, POWDER, FOR SOLUTION INTRAVENOUS at 01:10

## 2017-03-14 RX ADMIN — PANCRELIPASE 72000 UNITS: 24000; 76000; 120000 CAPSULE, DELAYED RELEASE PELLETS ORAL at 17:04

## 2017-03-14 RX ADMIN — LINEZOLID 600 MG: 600 TABLET, FILM COATED ORAL at 09:30

## 2017-03-14 RX ADMIN — SODIUM BICARBONATE 325 MG: 325 TABLET ORAL at 20:08

## 2017-03-14 RX ADMIN — Medication 1 PACKET: at 10:29

## 2017-03-14 RX ADMIN — Medication 1 PACKET: at 15:05

## 2017-03-14 RX ADMIN — MEROPENEM 1 G: 1 INJECTION, POWDER, FOR SOLUTION INTRAVENOUS at 17:04

## 2017-03-14 RX ADMIN — MEROPENEM 1 G: 1 INJECTION, POWDER, FOR SOLUTION INTRAVENOUS at 09:26

## 2017-03-14 RX ADMIN — PANTOPRAZOLE SODIUM 40 MG: 40 TABLET, DELAYED RELEASE ORAL at 09:30

## 2017-03-14 RX ADMIN — MULTIPLE VITAMINS W/ MINERALS TAB 1 TABLET: TAB at 18:59

## 2017-03-14 RX ADMIN — SODIUM BICARBONATE 325 MG: 325 TABLET ORAL at 17:05

## 2017-03-14 RX ADMIN — NICOTINE 1 PATCH: 14 PATCH, EXTENDED RELEASE TRANSDERMAL at 09:30

## 2017-03-14 RX ADMIN — PANCRELIPASE 72000 UNITS: 24000; 76000; 120000 CAPSULE, DELAYED RELEASE PELLETS ORAL at 01:18

## 2017-03-14 RX ADMIN — SODIUM BICARBONATE 325 MG: 325 TABLET ORAL at 01:18

## 2017-03-14 RX ADMIN — PANCRELIPASE 72000 UNITS: 24000; 76000; 120000 CAPSULE, DELAYED RELEASE PELLETS ORAL at 20:08

## 2017-03-14 RX ADMIN — LINEZOLID 600 MG: 600 TABLET, FILM COATED ORAL at 20:08

## 2017-03-14 RX ADMIN — SODIUM CHLORIDE, PRESERVATIVE FREE 84 ML: 5 INJECTION INTRAVENOUS at 10:58

## 2017-03-14 RX ADMIN — ONDANSETRON HYDROCHLORIDE 4 MG: 4 TABLET, FILM COATED ORAL at 04:58

## 2017-03-14 RX ADMIN — ACETAMINOPHEN 1000 MG: 325 TABLET, FILM COATED ORAL at 01:54

## 2017-03-14 RX ADMIN — FLUCONAZOLE 200 MG: 200 TABLET ORAL at 09:31

## 2017-03-14 RX ADMIN — POTASSIUM CHLORIDE 20 MEQ: 750 TABLET, EXTENDED RELEASE ORAL at 15:05

## 2017-03-14 RX ADMIN — PANTOPRAZOLE SODIUM 40 MG: 40 TABLET, DELAYED RELEASE ORAL at 17:05

## 2017-03-14 RX ADMIN — MELATONIN TAB 3 MG 3 MG: 3 TAB at 20:08

## 2017-03-14 RX ADMIN — PANCRELIPASE 72000 UNITS: 24000; 76000; 120000 CAPSULE, DELAYED RELEASE PELLETS ORAL at 05:04

## 2017-03-14 RX ADMIN — IOPAMIDOL 135 ML: 755 INJECTION, SOLUTION INTRAVENOUS at 10:57

## 2017-03-14 RX ADMIN — SODIUM BICARBONATE 325 MG: 325 TABLET ORAL at 05:04

## 2017-03-14 ASSESSMENT — PAIN DESCRIPTION - DESCRIPTORS: DESCRIPTORS: ACHING;BURNING

## 2017-03-14 NOTE — PLAN OF CARE
Problem: Goal Outcome Summary  Goal: Goal Outcome Summary  PT 7D- cx PT today. Pt going to test in am and when PT returned pt was out of room and did not return for a long time.

## 2017-03-14 NOTE — PLAN OF CARE
Problem: Goal Outcome Summary  Goal: Goal Outcome Summary  Outcome: No Change     VSS, afebrile. Denies nausea, Tylenol given for HA. TF started at 115 cc/hr with Creon & bicarb. Plan for abdominal CT tomorrow and necrosectomy on Thursday.  Up independently, adequate UOP. No acute events. Will continue to monitor and with POC.

## 2017-03-14 NOTE — CONSULTS
Patient is on IR schedule 3/15/2017 for a visceral angiogram and possible embolization.   Labs WNL for procedure.   Orders for NPO and scrubs have been entered.   Consent will be done prior to procedure.     Please contact the IR [place] RN [place] [phone] for estimated time of procedure.     Case discussed with Letha from GI and Dr. Gracia and Dr. Manzanares from IR.    Nancy Garcia, TOÑA, APRN  Interventional Radiology  Phone: [phone]  Pager: [phone]

## 2017-03-14 NOTE — PLAN OF CARE
Problem: Goal Outcome Summary  Goal: Goal Outcome Summary  Outcome: No Change  Patient complained of upset stomach-offered tylenol and he accepted that and later found pt with some improvement but not completely gone- so asked for and received zofran order which pt took- No new complaints but sleeping now..

## 2017-03-14 NOTE — PROGRESS NOTES
GASTROENTEROLOGY PROGRESS NOTE    Date of Admission: 2/25/2017  Reason for Admission: necrotizing pancreatitis     ASSESSMENT:  46 year old male with past history depression, anxiety, tobacco abuse, past ETOH abuse, recent history significant for presumed gallstone pancreatitis (12/14/2016) s/p ERCP w sphincterotomy with biliary sludge (12/17/17), laparoscopic cholecystectomy (12/18/17), repeat ERCP with biliary stent (7 cm x 10 F ) for elevated LFT (1/12/17) transferred here from [place], [place]) for further management of necrotizing pancreatitis with acute necrotic collections.   Also complicated by acute kidney injury (resolved), ascites and pleural effusions s/p chest tube (now removed). On empiric meropenem since 2/14 (signout from [place] suggested infected ascites though we do not have those records).     Regarding nutrition, the patient is tolerating tube feeds but feels that the NJ tube is inhibiting his ability to eat po. Option presented to remove NJ tube today, see how he tolerates diet and if not tolerating well will plan for PEG-J placement at time of necrosectomy. He and his wife will think about this option.    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 Discussed with Interventional Radiology who will plan for potential coiling/embolization of this tomorrow.     Necrotizing pancreatitis summary:  3/1 Off merro developed drenching sweats, rising WBC thus restarted and CT w/IV contrast done. There is a maturing collection inferior to the stomach, partially loculated ascites, recurrent pleural effusion and immature LUQ necrotic collections and inflammation.   3/2 Ascites tap done with ~500 neutrophils and excessively high amylase/lipase c/w pancreatic ascites culture + for VRE.  3/3, Axios and solus placement in lesser sac collection. ERCP with sphincterotomy and  "biliary stent removal also done.   3/3 WON culture with candida  3/7 hematemesis, had EGD and necrosectomy with clot at base of Axios in the necrotic cavity. No visible vessel to treat but this was the source of bleeding. He had old blood in the duodenum as well. Axios removed, double pigtails placed in its absence.   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      RECOMMENDATIONS:    Consult Interventional Radiology for consideration of embolizing/coiling splenic pseudoaneurysm    Cont antimicrobials per ID (until ~3/21)    Will postpone necrosectomy     Consider removing NJ tube today, if patient cannot tolerate diet po will consider PEG-J placement at time of necrosectomy     Low fat diet    PT/OT    Hold anticoagulation    BID PPI    The patient was discussed and plan agreed upon with GI staff, Dr Larry Cadena.        Letha Flores PA-C   Therapeutic Endoscopy/Pancreaticobiliary [facility]  [facility]  Pager *[phone]  _______________________________________________________________      Subjective: Patient seen and examined at 1215. Long discussion with patient, wife and primary team regarding nutrition, considering PEG-J placement. He denies nausea or vomiting. He reports occasional sharp pains in LUQ.    ROS:   10 pt ROS negative unless noted in subjective.     Objective:  Blood pressure 125/68, pulse 78, temperature 97.7  F (36.5  C), temperature source Oral, resp. rate 18, height 1.905 m (6' 3\"), weight 105.1 kg (231 lb 12.8 oz), SpO2 98 %.  Gen: NAD  HEENT: NJ tube, anicteric sclera  Abd: Soft, obese, tender diffusely, mainly in upper abdomen L>R  Ext: WWP  Skin: No jaundice        PROCEDURES:  3/3, Axios and solus placement in lesser sac collection. ERCP with sphincterotomy and stent removal also done  3/7 EGD and necrosectomy with clot at base of Axios in the necrotic cavity. Axios removed, replaced " .    LABS:  BMP    Recent Labs  Lab 03/10/17  0552 03/09/17  0638 03/08/17  0703    138 141   POTASSIUM 3.7 3.7 3.7   CHLORIDE 106 104 104   YAN 8.0* 8.1* 7.7*   CO2 26 27 26   BUN 26 28 32*   CR 0.82 0.91 0.98   * 101* 98     CBC    Recent Labs  Lab 03/14/17  0532 03/13/17  0530 03/12/17  0606 03/11/17  0530   WBC 10.5 11.3* 10.2 10.3   RBC 2.89* 2.94* 2.76* 2.81*   HGB 7.5* 7.5* 7.1* 7.2*   HCT 23.5* 24.0* 22.4* 23.0*   MCV 81 82 81 82   MCH 26.0* 25.5* 25.7* 25.6*   MCHC 31.9 31.3* 31.7 31.3*   RDW 17.5* 17.6* 17.6* 17.5*   * 563* 526* 563*     INR    Recent Labs  Lab 03/08/17  1159   INR 1.24*       IMAGING:  Reviewed

## 2017-03-14 NOTE — PROGRESS NOTES
[facility]     Medicine Progress Note  Jayson Bella MRN: [id number]  1970  Date of Admission:2/25/2017  Date of Service: 03/14/2017  ___________________________________    Main Plans for Today   - Plan for Abd CT 3/14 and necrosectomy 3/15  - Discharge planning  - Encourage IS use  - Patient to consider PEG tube placement for long term management    Assessment & Plan    Jayson Bella is a 46 year old White male with a past medical history significant for gallstone pancreatitis s/p cholecystectomy in 12/2016 c/b biliary obstruction d/t large pseudocyst s/p ERCP with stent placement 1/17 who was transferred from OSH with sepsis, fever, leukocytosis concerning for necrotizing pancreatitis and abscess formation. Patient is clinically improving, but will require further necrosectomy.     # Necrotizing pancreatitis c/b infected summer-pancreatic walled off necrosis and loculated peritoneal fluid collections  - Continue PO linezolid 600mg q12  - Fluconazole 200mg po qday  - Continue meropenem  - Monitor for fever, hemodynamic instability  - Daily CBCs  - Per GI, repeat abd CT today with IV contrast and plan for necrosectomy 3/15 (NPO at midnight)    # Acute GI bleed, resolved  # Hematemesis, resolved  Patient had episode of hematemesis concerning for GI bleed s/p EUS, EGD and necrosectomy 3/7/17.  - GI following  - Protonix bid  - Daily Hgb     # Hypervolemia- improved.  Ascites likely secondary to IVF in setting of sepsis and hypoalbuminemia  - recent weight of 106 kg on 2/13- currently very close to baseline  - strict I&O  - Daily weights  - Dc lasix  - K/M repletion protocols with goal 4/2     # Malnutrition: discussed extensively with patient, wife, and GI. Patient and wife would really like NJ out as they feel as it may make eating easier for him. However, patient concerned about malnutrition and whether he will be able to maintain needs. Discussed option of PEG tube placement tomorrow during OR procedure.    - Continue tube feeds  - Nutrition consult   - Creon     # L sided exudative pleural effusion - chest tube removed 2/26. Resolved.  No growth from pleural effusion cultures from OSH.  - chest tube removed 2/26, f/u xr without evidence of pneumo     # Pain  # Insomnia  - Acetaminophen 500 mg q6h  - Melatonin 3 mg Qevening scheduled    # Depression, low mood  - Continue mirtazapine 7.5mg   -  consult   ------------------------------------------------------------------------------------------------------------------  Prophylaxis:     -GI: PPI bid    -DVT: SCDs    FEN: Tube feeds + encourage PO diet  Code status: Full  Disposition: Pending clinical stability and improvement  =========================================================  Patient was discussed and evaluated with Dr. Ray Bonilla MD  Internal Medicine/Pediatrics, PGY2    Interval History   Patient is doing well today. Wife Bri upset that he is not challenging himself with PO intake. Patient scared to eat due to possible stomach pain as that is what happened last time. He denies any pain. He is     Review of Systems   ROS:  6 point ROS including Respiratory, CV, GI and , other than that noted above, is negative.  Physical Exam   Vital Signs with Ranges  Temp:  [97.5  F (36.4  C)-99.3  F (37.4  C)] 97.7  F (36.5  C)  Pulse:  [78-87] 78  Heart Rate:  [91-95] 91  Resp:  [18-20] 18  BP: (121-138)/(63-89) 125/68  SpO2:  [95 %-98 %] 98 %  I/O last 3 completed shifts:  In: 1185 [NG/GT:150]  Out: 375 [Urine:375]  Wt Readings from Last 1 Encounters:   03/14/17 105.1 kg (231 lb 12.8 oz)    Body mass index is 29.4 kg/(m^2). Resp: 20    General: Sitting in bed, alert, oriented, NAD.  Eyes: Conjunctiva clear.  HEENT: NJ in place. Oropharynx is clear and moist.   Cardiovascular: Regular rate and rhythm, normal S1 and S2, and no murmur noted.   Respiratory: Diminished in left lower base. No wheezing or crackles.  GI: Soft, non-tender,  non-distended.  Genitourinary: Deferred  Musculoskeletal: No edema.  Skin: Warm and dry, no rashes.   Neurologic:  Cranial nerves are grossly intact. No focal deficits.      Data   Labs & Studies of Note: I personally reviewed the following studies.    ROUTINE ICU LABS (Last four results)  CMP    Recent Labs  Lab 03/14/17  1143 03/13/17  0530 03/10/17  0552 03/09/17  0638 03/08/17  0703     --  141 138 141   POTASSIUM 4.0  --  3.7 3.7 3.7   CHLORIDE 101  --  106 104 104   CO2 27  --  26 27 26   ANIONGAP 9  --  9 8 11   GLC 98  --  108* 101* 98   BUN 29  --  26 28 32*   CR 0.68  --  0.82 0.91 0.98   GFRESTIMATED >90Non  GFR Calc  --  >90Non  GFR Calc 90 82   GFRESTBLACK >90African American GFR Calc  --  >90African American GFR Calc >90African American GFR Calc >90African American GFR Calc   YAN 8.3*  --  8.0* 8.1* 7.7*   PHOS  --   --  2.4*  --   --    PROTTOTAL 7.4 7.4  --   --   --    ALBUMIN 2.0* 2.0*  --   --   --    BILITOTAL 0.2 0.2  --   --   --    ALKPHOS 83 85  --   --   --    AST 13 14  --   --   --    ALT 14 13  --   --   --      CBC    Recent Labs  Lab 03/14/17  1143 03/14/17  0532 03/13/17  0530 03/12/17  0606   WBC 10.0 10.5 11.3* 10.2   RBC 2.83* 2.89* 2.94* 2.76*   HGB 7.3* 7.5* 7.5* 7.1*   HCT 22.9* 23.5* 24.0* 22.4*   MCV 81 81 82 81   MCH 25.8* 26.0* 25.5* 25.7*   MCHC 31.9 31.9 31.3* 31.7   RDW 17.3* 17.5* 17.6* 17.6*    494* 563* 526*       Microbiology: no new data Medications   Medications list for Reference (delete if desired)  Current Facility-Administered Medications   Medication     ondansetron (ZOFRAN) tablet 4 mg     lidocaine 1 % 1 mL     lidocaine (LMX4) kit     sodium chloride (PF) 0.9% PF flush 3 mL     sodium chloride (PF) 0.9% PF flush 3 mL     fiber modular (NUTRISOURCE FIBER) packet 1 packet     senna-docusate (SENOKOT-S;PERICOLACE) 8.6-50 MG per tablet 2 tablet     pantoprazole (PROTONIX) EC tablet 40 mg     mirtazapine (REMERON)  tablet TABS 7.5 mg     melatonin tablet 3 mg     fluconazole (DIFLUCAN) tablet 200 mg     linezolid (ZYVOX) tablet 600 mg     acetaminophen (TYLENOL) tablet 1,000 mg     sodium chloride (PF) 0.9% PF flush 3 mL     amylase-lipase-protease (CREON 24) 56188 UNITS per EC capsule 72,000 Units    And     sodium bicarbonate tablet 325 mg     meropenem (MERREM) 1 g vial to attach to  mL bag     lidocaine 1 % 1 mL     lidocaine (LMX4) kit     sodium chloride (PF) 0.9% PF flush 3 mL     sodium chloride (PF) 0.9% PF flush 3 mL     May continue current IV fluids if patient has IV fluids infusing.     May take regular AM medications except those listed below     sodium chloride (PF) 0.9% PF flush 3 mL     potassium chloride SA (K-DUR/KLOR-CON M) CR tablet 20-40 mEq     potassium chloride (KLOR-CON) Packet 20-40 mEq     potassium chloride 10 mEq in 100 mL intermittent infusion     potassium chloride 10 mEq in 100 mL intermittent infusion with 10 mg lidocaine     potassium chloride 20 mEq in 50 mL intermittent infusion     magnesium sulfate 2 g in NS intermittent infusion (PharMEDium or FV Cmpd)     magnesium sulfate 4 g in 100 mL sterile water (premade)     magnesium hydroxide (MILK OF MAGNESIA) suspension 30 mL     pneumococcal vaccine (PNEUMOVAX 23-edna) injection 0.5 mL     nicotine Patch in Place     nicotine patch REMOVAL     nicotine (NICODERM CQ) 14 MG/24HR 24 hr patch 1 patch     dextrose 10 % 1,000 mL infusion     multivitamins with minerals (CERTAVITE/CEROVITE) liquid 15 mL     naloxone (NARCAN) injection 0.1-0.4 mg

## 2017-03-14 NOTE — PLAN OF CARE
Problem: Pancreatitis, Acute/Chronic (Adult)  Goal: Signs and Symptoms of Listed Potential Problems Will be Absent or Manageable (Pancreatitis, Acute/Chronic)  Signs and symptoms of listed potential problems will be absent or manageable by discharge/transition of care (reference Pancreatitis, Acute/Chronic (Adult) CPG).   Outcome: No Change  Necrosectomy canceled for tomorrow due to the fact that he has a anuresyum that IR will fix tomorrow. NPO after midnight. Will need a scrub on evenings, nights, and prior to procedure tomorrow. Plan is to discontinue his feedings and have him try to eat. Feeding tube will be pulled. Up independently.

## 2017-03-15 ENCOUNTER — APPOINTMENT (OUTPATIENT)
Dept: INTERVENTIONAL RADIOLOGY/VASCULAR | Facility: CLINIC | Age: 47
DRG: 871 | End: 2017-03-15
Attending: NURSE PRACTITIONER

## 2017-03-15 PROCEDURE — 36247 INS CATH ABD/L-EXT ART 3RD: CPT

## 2017-03-15 PROCEDURE — C1769 GUIDE WIRE: HCPCS

## 2017-03-15 PROCEDURE — 99153 MOD SED SAME PHYS/QHP EA: CPT

## 2017-03-15 PROCEDURE — C1760 CLOSURE DEV, VASC: HCPCS

## 2017-03-15 PROCEDURE — 25000132 ZZH RX MED GY IP 250 OP 250 PS 637: Performed by: INTERNAL MEDICINE

## 2017-03-15 PROCEDURE — C1887 CATHETER, GUIDING: HCPCS

## 2017-03-15 PROCEDURE — 25000132 ZZH RX MED GY IP 250 OP 250 PS 637: Performed by: STUDENT IN AN ORGANIZED HEALTH CARE EDUCATION/TRAINING PROGRAM

## 2017-03-15 PROCEDURE — 75774 ARTERY X-RAY EACH VESSEL: CPT

## 2017-03-15 PROCEDURE — 27810343 ZZH COIL/EMBOLIC DEVICE CR17

## 2017-03-15 PROCEDURE — 25500064 ZZH RX 255 OP 636: Performed by: RADIOLOGY

## 2017-03-15 PROCEDURE — 37243 VASC EMBOLIZE/OCCLUDE ORGAN: CPT

## 2017-03-15 PROCEDURE — 25000128 H RX IP 250 OP 636: Performed by: STUDENT IN AN ORGANIZED HEALTH CARE EDUCATION/TRAINING PROGRAM

## 2017-03-15 PROCEDURE — 27210995 ZZH RX 272: Performed by: RADIOLOGY

## 2017-03-15 PROCEDURE — 75726 ARTERY X-RAYS ABDOMEN: CPT

## 2017-03-15 PROCEDURE — 40000556 ZZH STATISTIC PERIPHERAL IV START W US GUIDANCE

## 2017-03-15 PROCEDURE — 25000128 H RX IP 250 OP 636: Performed by: RADIOLOGY

## 2017-03-15 PROCEDURE — 99152 MOD SED SAME PHYS/QHP 5/>YRS: CPT

## 2017-03-15 PROCEDURE — 27210805 ZZH SHEATH CR4

## 2017-03-15 PROCEDURE — 99232 SBSQ HOSP IP/OBS MODERATE 35: CPT | Mod: GC | Performed by: INTERNAL MEDICINE

## 2017-03-15 PROCEDURE — 27210780 ZZH KIT CR3

## 2017-03-15 PROCEDURE — 27210738 ZZH ACCESSORY CR2

## 2017-03-15 PROCEDURE — 27210804 ZZH SHEATH CR3

## 2017-03-15 PROCEDURE — 12000008 ZZH R&B INTERMEDIATE UMMC

## 2017-03-15 PROCEDURE — 25000125 ZZHC RX 250: Performed by: RADIOLOGY

## 2017-03-15 PROCEDURE — 27210905 ZZH KIT CR7

## 2017-03-15 PROCEDURE — 25000125 ZZHC RX 250: Performed by: STUDENT IN AN ORGANIZED HEALTH CARE EDUCATION/TRAINING PROGRAM

## 2017-03-15 PROCEDURE — 25000128 H RX IP 250 OP 636: Performed by: NURSE PRACTITIONER

## 2017-03-15 PROCEDURE — 27210732 ZZH ACCESSORY CR1

## 2017-03-15 RX ORDER — IODIXANOL 320 MG/ML
150 INJECTION, SOLUTION INTRAVASCULAR ONCE
Status: COMPLETED | OUTPATIENT
Start: 2017-03-15 | End: 2017-03-15

## 2017-03-15 RX ORDER — FLUMAZENIL 0.1 MG/ML
0.2 INJECTION, SOLUTION INTRAVENOUS
Status: DISCONTINUED | OUTPATIENT
Start: 2017-03-15 | End: 2017-03-15 | Stop reason: HOSPADM

## 2017-03-15 RX ORDER — FENTANYL CITRATE 50 UG/ML
25-50 INJECTION, SOLUTION INTRAMUSCULAR; INTRAVENOUS EVERY 5 MIN PRN
Status: DISCONTINUED | OUTPATIENT
Start: 2017-03-15 | End: 2017-03-15 | Stop reason: HOSPADM

## 2017-03-15 RX ORDER — NALOXONE HYDROCHLORIDE 0.4 MG/ML
.1-.4 INJECTION, SOLUTION INTRAMUSCULAR; INTRAVENOUS; SUBCUTANEOUS
Status: DISCONTINUED | OUTPATIENT
Start: 2017-03-15 | End: 2017-03-15 | Stop reason: HOSPADM

## 2017-03-15 RX ADMIN — FENTANYL CITRATE 200 MCG: 50 INJECTION, SOLUTION INTRAMUSCULAR; INTRAVENOUS at 13:08

## 2017-03-15 RX ADMIN — PANCRELIPASE 72000 UNITS: 24000; 76000; 120000 CAPSULE, DELAYED RELEASE PELLETS ORAL at 06:57

## 2017-03-15 RX ADMIN — MIRTAZAPINE 7.5 MG: 7.5 TABLET, FILM COATED ORAL at 00:41

## 2017-03-15 RX ADMIN — SODIUM CHLORIDE: 9 INJECTION, SOLUTION INTRAVENOUS at 03:00

## 2017-03-15 RX ADMIN — FLUCONAZOLE 200 MG: 200 TABLET ORAL at 14:11

## 2017-03-15 RX ADMIN — PANCRELIPASE 72000 UNITS: 24000; 76000; 120000 CAPSULE, DELAYED RELEASE PELLETS ORAL at 00:42

## 2017-03-15 RX ADMIN — Medication 5000 UNITS: at 12:06

## 2017-03-15 RX ADMIN — SODIUM BICARBONATE 325 MG: 325 TABLET ORAL at 00:41

## 2017-03-15 RX ADMIN — POTASSIUM CHLORIDE 10 MEQ: 14.9 INJECTION, SOLUTION, CONCENTRATE PARENTERAL at 17:07

## 2017-03-15 RX ADMIN — IODIXANOL 200 ML: 320 INJECTION, SOLUTION INTRAVASCULAR at 13:14

## 2017-03-15 RX ADMIN — Medication 5000 UNITS: at 11:06

## 2017-03-15 RX ADMIN — PANTOPRAZOLE SODIUM 40 MG: 40 TABLET, DELAYED RELEASE ORAL at 14:11

## 2017-03-15 RX ADMIN — ACETAMINOPHEN 1000 MG: 325 TABLET, FILM COATED ORAL at 14:06

## 2017-03-15 RX ADMIN — MIDAZOLAM 2 MG: 1 INJECTION INTRAMUSCULAR; INTRAVENOUS at 13:07

## 2017-03-15 RX ADMIN — SODIUM CHLORIDE: 9 INJECTION, SOLUTION INTRAVENOUS at 13:27

## 2017-03-15 RX ADMIN — PANTOPRAZOLE SODIUM 40 MG: 40 TABLET, DELAYED RELEASE ORAL at 18:49

## 2017-03-15 RX ADMIN — LIDOCAINE HYDROCHLORIDE 8 ML: 10 INJECTION, SOLUTION EPIDURAL; INFILTRATION; INTRACAUDAL; PERINEURAL at 09:20

## 2017-03-15 RX ADMIN — MIRTAZAPINE 7.5 MG: 7.5 TABLET, FILM COATED ORAL at 20:56

## 2017-03-15 RX ADMIN — MELATONIN TAB 3 MG 3 MG: 3 TAB at 20:56

## 2017-03-15 RX ADMIN — SODIUM BICARBONATE 325 MG: 325 TABLET ORAL at 06:57

## 2017-03-15 RX ADMIN — MEROPENEM 1 G: 1 INJECTION, POWDER, FOR SOLUTION INTRAVENOUS at 14:16

## 2017-03-15 RX ADMIN — MEROPENEM 1 G: 1 INJECTION, POWDER, FOR SOLUTION INTRAVENOUS at 19:15

## 2017-03-15 RX ADMIN — Medication 1 PACKET: at 14:09

## 2017-03-15 RX ADMIN — LINEZOLID 600 MG: 600 TABLET, FILM COATED ORAL at 14:11

## 2017-03-15 RX ADMIN — FENTANYL CITRATE 150 MCG: 50 INJECTION, SOLUTION INTRAMUSCULAR; INTRAVENOUS at 11:05

## 2017-03-15 RX ADMIN — NICOTINE 1 PATCH: 14 PATCH, EXTENDED RELEASE TRANSDERMAL at 14:09

## 2017-03-15 RX ADMIN — MIDAZOLAM 3 MG: 1 INJECTION INTRAMUSCULAR; INTRAVENOUS at 11:05

## 2017-03-15 RX ADMIN — PANCRELIPASE 72000 UNITS: 24000; 76000; 120000 CAPSULE, DELAYED RELEASE PELLETS ORAL at 18:49

## 2017-03-15 RX ADMIN — MEROPENEM 1 G: 1 INJECTION, POWDER, FOR SOLUTION INTRAVENOUS at 03:00

## 2017-03-15 RX ADMIN — LINEZOLID 600 MG: 600 TABLET, FILM COATED ORAL at 20:57

## 2017-03-15 NOTE — PLAN OF CARE
Problem: Pancreatitis, Acute/Chronic (Adult)  Goal: Signs and Symptoms of Listed Potential Problems Will be Absent or Manageable (Pancreatitis, Acute/Chronic)  Signs and symptoms of listed potential problems will be absent or manageable by discharge/transition of care (reference Pancreatitis, Acute/Chronic (Adult) CPG).   Outcome: No Change  Was gone most of shift to I.R. For fixing an aneurism. Positive foot pulse and right groin site CDI. Tylenol for back pain due to lying on the I.R. Table so long. Will need to be on bedrest until 1615.

## 2017-03-15 NOTE — PLAN OF CARE
Problem: Goal Outcome Summary  Goal: Goal Outcome Summary  PT 7D- cx PT today. Pt at procedure for 4 hours and then on bed rest for  another 2 hours.

## 2017-03-15 NOTE — PROGRESS NOTES
[facility]     Medicine Progress Note  Jayson Bella MRN: [id number]  1970  Date of Admission:2/25/2017  Date of Service: 03/15/2017  ___________________________________    Main Plans for Today   - IR today for splenic artery pseudoaneurysm embolization  - NPO until procedure, then low fat diet  - Psychology consult   - Will discuss with GI about necroscetomy  - Encourage PO diet  - Check magnesium, phos, lipase, amylase, BMP and CBC in AM    Assessment & Plan    Jayson Bella is a 46 year old White male with a past medical history significant for gallstone pancreatitis s/p cholecystectomy in 12/2016 c/b biliary obstruction d/t large pseudocyst s/p ERCP with stent placement 1/17 who was transferred from OSH with sepsis, fever, leukocytosis concerning for necrotizing pancreatitis and abscess formation s/p necrosectomy 3/7/17 and 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 Patient is clinically improving, but will require further necrosectomy.     # Necrotizing pancreatitis c/b infected summer-pancreatic walled off necrosis and loculated peritoneal fluid collections s/p necrosectomy 3/7/17  - Continue PO linezolid 600mg q12, fluconazole 200mg po qday and meropenem 1g q8  - Daily CBCs  - Will discuss with GI plan for next necrosectomy     # Acute GI bleed, resolved  # Hematemesis, resolved  Patient had episode of hematemesis concerning for GI bleed s/p EUS, EGD and necrosectomy 3/7/17.  - GI following  - Protonix bid  - Daily Hgb     # Hypervolemia- improved.  Ascites likely secondary to IVF in setting of sepsis and hypoalbuminemia  - recent weight of 106 kg on 2/13- currently very close to baseline  - strict I&O  - Daily weights  - K/M repletion protocols with goal 4/2     # Malnutrition  Removed NJ yesterday. PO trial with possible PEG tube placement with next necrosectomy.  - Nutrition consult   - Creon PO  - Mag and phos with morning labs     # L sided exudative pleural effusion - chest tube  removed 2/26. Resolved.     # Pain  # Insomnia  - Acetaminophen 500 mg q6h  - Melatonin 3 mg Qevening scheduled    # Depression, low mood  - Continue mirtazapine 7.5mg   -  consult   - Psychology consult  ------------------------------------------------------------------------------------------------------------------  Prophylaxis:     -GI: PPI bid    -DVT: SCDs    FEN: Low fat diet  Code status: Full  Disposition: Pending clinical stability and improvement  =========================================================  Patient was discussed and evaluated with Dr. aRy Rivera MD  Internal Medicine/Pediatrics, PGY1    Interval History   Patient denies any pain today. He continues to have trouble eating. He reports that things do not taste good anymore. He understands his embolectomy procedure.   Review of Systems   Denies nausea, vomiting, abdominal pain. Decreased appetite.   Physical Exam   Vital Signs with Ranges  Temp:  [97.7  F (36.5  C)-98.6  F (37  C)] 98.2  F (36.8  C)  Heart Rate:  [73-93] 80  Resp:  [12-25] 18  BP: (112-156)/(71-99) 126/80  SpO2:  [93 %-100 %] 96 %  I/O last 3 completed shifts:  In: 400 [I.V.:400]  Out: -   Wt Readings from Last 1 Encounters:   03/15/17 104.1 kg (229 lb 8 oz)    Body mass index is 28.69 kg/(m^2). Resp: 18    General: In bed, alert, oriented. NAD.  Eyes: Conjunctiva clear.  HEENT: Oropharynx is clear and moist.   Cardiovascular: Regular rate and rhythm, normal S1 and S2, and no murmur noted.   Respiratory: Clear to auscultation bilaterally. No wheezing or crackles.  GI: Soft, non-tender, non-distended.  Musculoskeletal: No edema.  Skin: Warm and dry, no rashes.   Neurologic:  Alert and oriented x3. Cranial nerves are grossly intact. No focal deficits.    Data   Labs & Studies of Note: I personally reviewed the following studies.    ROUTINE ICU LABS (Last four results)  CMP    Recent Labs  Lab 03/14/17  1143 03/13/17  0530 03/10/17  0552 03/09/17  0638   NA  137  --  141 138   POTASSIUM 4.0  --  3.7 3.7   CHLORIDE 101  --  106 104   CO2 27  --  26 27   ANIONGAP 9  --  9 8   GLC 98  --  108* 101*   BUN 29  --  26 28   CR 0.68  --  0.82 0.91   GFRESTIMATED >90Non  GFR Calc  --  >90Non  GFR Calc 90   GFRESTBLACK >90African American GFR Calc  --  >90African American GFR Calc >90African American GFR Calc   YAN 8.3*  --  8.0* 8.1*   PHOS  --   --  2.4*  --    PROTTOTAL 7.4 7.4  --   --    ALBUMIN 2.0* 2.0*  --   --    BILITOTAL 0.2 0.2  --   --    ALKPHOS 83 85  --   --    AST 13 14  --   --    ALT 14 13  --   --      CBC    Recent Labs  Lab 03/14/17  1143 03/14/17  0532 03/13/17  0530 03/12/17  0606   WBC 10.0 10.5 11.3* 10.2   RBC 2.83* 2.89* 2.94* 2.76*   HGB 7.3* 7.5* 7.5* 7.1*   HCT 22.9* 23.5* 24.0* 22.4*   MCV 81 81 82 81   MCH 25.8* 26.0* 25.5* 25.7*   MCHC 31.9 31.9 31.3* 31.7   RDW 17.3* 17.5* 17.6* 17.6*    494* 563* 526*       Microbiology: no new data Medications   Medications list for Reference (delete if desired)  Current Facility-Administered Medications   Medication     Hold: Metformin and metformin containing medications on day of the procedure and for 48 hours after IV contrast given     amylase-lipase-protease (CREON 24) 75720 UNITS per EC capsule 72,000 Units     ondansetron (ZOFRAN) tablet 4 mg     multivitamin, therapeutic with minerals (THERA-VIT-M) tablet 1 tablet     fiber modular (NUTRISOURCE FIBER) packet 1 packet     senna-docusate (SENOKOT-S;PERICOLACE) 8.6-50 MG per tablet 2 tablet     pantoprazole (PROTONIX) EC tablet 40 mg     mirtazapine (REMERON) tablet TABS 7.5 mg     melatonin tablet 3 mg     fluconazole (DIFLUCAN) tablet 200 mg     linezolid (ZYVOX) tablet 600 mg     acetaminophen (TYLENOL) tablet 1,000 mg     sodium chloride (PF) 0.9% PF flush 3 mL     meropenem (MERREM) 1 g vial to attach to  mL bag     lidocaine 1 % 1 mL     lidocaine (LMX4) kit     sodium chloride (PF) 0.9% PF flush 3 mL      sodium chloride (PF) 0.9% PF flush 3 mL     May continue current IV fluids if patient has IV fluids infusing.     May take regular AM medications except those listed below     sodium chloride (PF) 0.9% PF flush 3 mL     potassium chloride SA (K-DUR/KLOR-CON M) CR tablet 20-40 mEq     potassium chloride (KLOR-CON) Packet 20-40 mEq     potassium chloride 10 mEq in 100 mL intermittent infusion     potassium chloride 10 mEq in 100 mL intermittent infusion with 10 mg lidocaine     potassium chloride 20 mEq in 50 mL intermittent infusion     magnesium sulfate 2 g in NS intermittent infusion (PharMEDium or FV Cmpd)     magnesium sulfate 4 g in 100 mL sterile water (premade)     magnesium hydroxide (MILK OF MAGNESIA) suspension 30 mL     pneumococcal vaccine (PNEUMOVAX 23-edna) injection 0.5 mL     nicotine Patch in Place     nicotine patch REMOVAL     nicotine (NICODERM CQ) 14 MG/24HR 24 hr patch 1 patch     dextrose 10 % 1,000 mL infusion     naloxone (NARCAN) injection 0.1-0.4 mg

## 2017-03-15 NOTE — BRIEF OP NOTE
Interventional Radiology Brief Post Procedure Note    Procedure: Splenic Artery Pseudoaneurysm embolization     Proceduralist: Belinda Dasilva MD and None    Assistant: Piter Holland MD and None    Time Out: Prior to the start of the procedure and with procedural staff participation, I verbally confirmed the patient s identity using two indicators, relevant allergies, that the procedure was appropriate and matched the consent or emergent situation, and that the correct equipment/implants were available. Immediately prior to starting the procedure I conducted the Time Out with the procedural staff and re-confirmed the patient s name, procedure, and site/side. (The Joint Commission universal protocol was followed.)  Yes    Sedation: IR Nurse Monitored Care   Post Procedure Summary:  Prior to the start of the procedure and with procedural staff participation, I verbally confirmed the patient s identity using two indicators, relevant allergies, that the procedure was appropriate and matched the consent or emergent situation, and that the correct equipment/implants were available. Immediately prior to starting the procedure I conducted the Time Out with the procedural staff and re-confirmed the patient s name, procedure, and site/side. (The Joint Commission universal protocol was followed.)  Yes       Sedatives: Fentanyl and Midazolam (Versed)    Vital signs, airway and pulse oximetry were monitored and remained stable throughout the procedure and sedation was maintained until the procedure was complete.  The patient was monitored by staff until sedation discharge criteria were met.    Patient tolerance: Patient tolerated the procedure well with no immediate complications.    Time of sedation in minutes: 270 minutes from beginning to end of physician one to one monitoring.        Findings: Small slow filling PSA near the splenic hilum coil embolized with ev3 Concerto detachable microcoils.      Estimated Blood Loss:  Minimal    Fluoroscopy Time: 80 minutes     SPECIMENS: None    Complications: 1. None     Condition: Stable    Plan:   - Bedrest with Right leg straight for 3 hours  - IR clinical and imaging follow up will be coordinated.    Comments: See dictated procedure note for full details.    Piter Holland MD

## 2017-03-15 NOTE — PROGRESS NOTES
Interim Progress Note 03/14/17    Repeat CT scan completed today showing stable to decreased size of fluid collections but also a new 10mm pseudoaneurysm in the splenic hilum without active bleeding. GI discussed case with IR who will plan for potential coiling/embolization of this tomorrow.    - No Anticoagulation  - IR consulted, recs appreciated  - Necrosectomy postponed, possible will be rescheduled for Thur or Fri per GI      Also, had long discussion with patient, wife and GI team regarding nutrition especially once discharged from the hospital and both this team and GI team are recommending PEG-J placement. Patient and family considering this plan as it may be easier to manage and more comfortable than NJ tube.   - Will remove NJ tube today to challenge PO intake until necrosectomy this week. If patient cannot tolerate diet po will consider PEG-J placement at time of necrosectomy.     GI consulted, recs appreciated.     Discussed with staff, Dr. Ray Bonilla MD  Internal Medicine/Pediatrics, PGY2

## 2017-03-15 NOTE — PLAN OF CARE
Problem: Goal Outcome Summary  Goal: Goal Outcome Summary  Outcome: No Change  Patient to go to IR today- NPO except with meds. Team clarified with PM nurse to continue enzymes and sodium bicarb through. night. Went to do last scrub and pt declined will wait for call. Also declined enzymes at 05 but took them 07.Will have ablation today

## 2017-03-15 NOTE — PROGRESS NOTES
Interventional Radiology Intra-procedural Nursing Note    Patient Name: Jayson Bella  Medical Record Number: [id number]  Today's Date: March 15, 2017    Start Time: 0917  End of procedure time: 1320  Procedure: Viseral angiogram and possible intervention/embolization   Report given to: Christi ARRIAGA 7D     Sedation start time: 0900  Sedation end time: 1320  Sedation medications given: versed 5 mg, fentanyl 350 mcg     IR Providers: Dr Dasilva, Dr Holland, Dr Gracia    0840-Rc'd pt from transport accompanied by spouse. Consent obtained. Pt transferred to procedure table using hovermat x3 assist. Placed on continuous monitoring, prepped and draped per policy by Dexter RTR. See VS flowsheet, MAR for further information. SBAR report given to Scott @ [facility]  Angle Perkins RN,BSN     D: Handoff report received from VALENTIN Perkins RN. Assumed care of Patient at 1110.  A: Patient tolerated the procedure without apparent incident. The right femoral artery puncture was closed with a 6 fr mynx device. Patient instructed to keep his right leg straight, not to lift his head off the pillows and to remain on strict bedrest until 1615 . The dressing over the right femoral artery puncture site is clean, dry and intact. Patient's wife; Bri was updated by Dr Holland.  P: Patient returned to 7D for continued cares. He is to remain on strict bedrest until 1615 . Patient may need reinforcement of restrictions due to conscious sedation.  Antonia Gil RN  Pager [phone]

## 2017-03-16 ENCOUNTER — APPOINTMENT (OUTPATIENT)
Dept: PHYSICAL THERAPY | Facility: CLINIC | Age: 47
DRG: 871 | End: 2017-03-16
Attending: INTERNAL MEDICINE

## 2017-03-16 ENCOUNTER — APPOINTMENT (OUTPATIENT)
Dept: OCCUPATIONAL THERAPY | Facility: CLINIC | Age: 47
DRG: 871 | End: 2017-03-16
Attending: INTERNAL MEDICINE

## 2017-03-16 LAB
AMYLASE SERPL-CCNC: 63 U/L (ref 30–110)
ANION GAP SERPL CALCULATED.3IONS-SCNC: 12 MMOL/L (ref 3–14)
BUN SERPL-MCNC: 15 MG/DL (ref 7–30)
CALCIUM SERPL-MCNC: 8.2 MG/DL (ref 8.5–10.1)
CHLORIDE SERPL-SCNC: 104 MMOL/L (ref 94–109)
CO2 SERPL-SCNC: 24 MMOL/L (ref 20–32)
CREAT SERPL-MCNC: 0.71 MG/DL (ref 0.66–1.25)
ERYTHROCYTE [DISTWIDTH] IN BLOOD BY AUTOMATED COUNT: 17.4 % (ref 10–15)
GFR SERPL CREATININE-BSD FRML MDRD: ABNORMAL ML/MIN/1.7M2
GLUCOSE SERPL-MCNC: 81 MG/DL (ref 70–99)
HCT VFR BLD AUTO: 23.3 % (ref 40–53)
HGB BLD-MCNC: 7.5 G/DL (ref 13.3–17.7)
LIPASE SERPL-CCNC: 246 U/L (ref 73–393)
MAGNESIUM SERPL-MCNC: 1.5 MG/DL (ref 1.6–2.3)
MCH RBC QN AUTO: 25.5 PG (ref 26.5–33)
MCHC RBC AUTO-ENTMCNC: 32.2 G/DL (ref 31.5–36.5)
MCV RBC AUTO: 79 FL (ref 78–100)
PHOSPHATE SERPL-MCNC: 3.3 MG/DL (ref 2.5–4.5)
PLATELET # BLD AUTO: 375 10E9/L (ref 150–450)
POTASSIUM SERPL-SCNC: 3.5 MMOL/L (ref 3.4–5.3)
RBC # BLD AUTO: 2.94 10E12/L (ref 4.4–5.9)
SODIUM SERPL-SCNC: 140 MMOL/L (ref 133–144)
WBC # BLD AUTO: 8.9 10E9/L (ref 4–11)

## 2017-03-16 PROCEDURE — 80048 BASIC METABOLIC PNL TOTAL CA: CPT | Performed by: STUDENT IN AN ORGANIZED HEALTH CARE EDUCATION/TRAINING PROGRAM

## 2017-03-16 PROCEDURE — 40000133 ZZH STATISTIC OT WARD VISIT: Performed by: OCCUPATIONAL THERAPIST

## 2017-03-16 PROCEDURE — 25000132 ZZH RX MED GY IP 250 OP 250 PS 637: Performed by: INTERNAL MEDICINE

## 2017-03-16 PROCEDURE — 25000132 ZZH RX MED GY IP 250 OP 250 PS 637: Performed by: STUDENT IN AN ORGANIZED HEALTH CARE EDUCATION/TRAINING PROGRAM

## 2017-03-16 PROCEDURE — 97116 GAIT TRAINING THERAPY: CPT | Mod: GP | Performed by: PHYSICAL THERAPIST

## 2017-03-16 PROCEDURE — 12000008 ZZH R&B INTERMEDIATE UMMC

## 2017-03-16 PROCEDURE — 36415 COLL VENOUS BLD VENIPUNCTURE: CPT | Performed by: STUDENT IN AN ORGANIZED HEALTH CARE EDUCATION/TRAINING PROGRAM

## 2017-03-16 PROCEDURE — 83690 ASSAY OF LIPASE: CPT | Performed by: STUDENT IN AN ORGANIZED HEALTH CARE EDUCATION/TRAINING PROGRAM

## 2017-03-16 PROCEDURE — 85027 COMPLETE CBC AUTOMATED: CPT | Performed by: STUDENT IN AN ORGANIZED HEALTH CARE EDUCATION/TRAINING PROGRAM

## 2017-03-16 PROCEDURE — 25000125 ZZHC RX 250: Performed by: STUDENT IN AN ORGANIZED HEALTH CARE EDUCATION/TRAINING PROGRAM

## 2017-03-16 PROCEDURE — 40000193 ZZH STATISTIC PT WARD VISIT: Performed by: PHYSICAL THERAPIST

## 2017-03-16 PROCEDURE — 97110 THERAPEUTIC EXERCISES: CPT | Mod: GP | Performed by: PHYSICAL THERAPIST

## 2017-03-16 PROCEDURE — 25000128 H RX IP 250 OP 636: Performed by: STUDENT IN AN ORGANIZED HEALTH CARE EDUCATION/TRAINING PROGRAM

## 2017-03-16 PROCEDURE — 97535 SELF CARE MNGMENT TRAINING: CPT | Mod: GO | Performed by: OCCUPATIONAL THERAPIST

## 2017-03-16 PROCEDURE — 83735 ASSAY OF MAGNESIUM: CPT | Performed by: STUDENT IN AN ORGANIZED HEALTH CARE EDUCATION/TRAINING PROGRAM

## 2017-03-16 PROCEDURE — 84100 ASSAY OF PHOSPHORUS: CPT | Performed by: STUDENT IN AN ORGANIZED HEALTH CARE EDUCATION/TRAINING PROGRAM

## 2017-03-16 PROCEDURE — 99232 SBSQ HOSP IP/OBS MODERATE 35: CPT | Mod: GC | Performed by: INTERNAL MEDICINE

## 2017-03-16 PROCEDURE — 82150 ASSAY OF AMYLASE: CPT | Performed by: STUDENT IN AN ORGANIZED HEALTH CARE EDUCATION/TRAINING PROGRAM

## 2017-03-16 RX ORDER — ERTAPENEM 1 G/1
1 INJECTION, POWDER, LYOPHILIZED, FOR SOLUTION INTRAMUSCULAR; INTRAVENOUS EVERY 24 HOURS
Status: DISCONTINUED | OUTPATIENT
Start: 2017-03-16 | End: 2017-03-17

## 2017-03-16 RX ORDER — MAGNESIUM SULFATE HEPTAHYDRATE 40 MG/ML
4 INJECTION, SOLUTION INTRAVENOUS EVERY 4 HOURS PRN
Status: DISCONTINUED | OUTPATIENT
Start: 2017-03-16 | End: 2017-03-18 | Stop reason: HOSPADM

## 2017-03-16 RX ORDER — POTASSIUM CHLORIDE 7.45 MG/ML
10 INJECTION INTRAVENOUS
Status: DISCONTINUED | OUTPATIENT
Start: 2017-03-16 | End: 2017-03-18 | Stop reason: HOSPADM

## 2017-03-16 RX ORDER — POTASSIUM CHLORIDE 1.5 G/1.58G
20-40 POWDER, FOR SOLUTION ORAL
Status: DISCONTINUED | OUTPATIENT
Start: 2017-03-16 | End: 2017-03-18 | Stop reason: HOSPADM

## 2017-03-16 RX ORDER — POTASSIUM CHLORIDE 29.8 MG/ML
20 INJECTION INTRAVENOUS
Status: DISCONTINUED | OUTPATIENT
Start: 2017-03-16 | End: 2017-03-18 | Stop reason: HOSPADM

## 2017-03-16 RX ORDER — POTASSIUM CHLORIDE 750 MG/1
20-40 TABLET, EXTENDED RELEASE ORAL
Status: DISCONTINUED | OUTPATIENT
Start: 2017-03-16 | End: 2017-03-18 | Stop reason: HOSPADM

## 2017-03-16 RX ADMIN — PANCRELIPASE 24000 UNITS: 24000; 76000; 120000 CAPSULE, DELAYED RELEASE PELLETS ORAL at 19:32

## 2017-03-16 RX ADMIN — PANTOPRAZOLE SODIUM 40 MG: 40 TABLET, DELAYED RELEASE ORAL at 08:27

## 2017-03-16 RX ADMIN — POTASSIUM CHLORIDE 20 MEQ: 750 TABLET, EXTENDED RELEASE ORAL at 09:20

## 2017-03-16 RX ADMIN — PANCRELIPASE 72000 UNITS: 24000; 76000; 120000 CAPSULE, DELAYED RELEASE PELLETS ORAL at 10:40

## 2017-03-16 RX ADMIN — LINEZOLID 600 MG: 600 TABLET, FILM COATED ORAL at 08:27

## 2017-03-16 RX ADMIN — ACETAMINOPHEN 1000 MG: 325 TABLET, FILM COATED ORAL at 02:29

## 2017-03-16 RX ADMIN — Medication 2 G: at 10:38

## 2017-03-16 RX ADMIN — LINEZOLID 600 MG: 600 TABLET, FILM COATED ORAL at 19:34

## 2017-03-16 RX ADMIN — MEROPENEM 1 G: 1 INJECTION, POWDER, FOR SOLUTION INTRAVENOUS at 09:20

## 2017-03-16 RX ADMIN — MULTIPLE VITAMINS W/ MINERALS TAB 1 TABLET: TAB at 19:33

## 2017-03-16 RX ADMIN — ACETAMINOPHEN 1000 MG: 325 TABLET, FILM COATED ORAL at 19:34

## 2017-03-16 RX ADMIN — PANTOPRAZOLE SODIUM 40 MG: 40 TABLET, DELAYED RELEASE ORAL at 16:15

## 2017-03-16 RX ADMIN — FLUCONAZOLE 200 MG: 200 TABLET ORAL at 08:27

## 2017-03-16 RX ADMIN — MIRTAZAPINE 7.5 MG: 7.5 TABLET, FILM COATED ORAL at 22:51

## 2017-03-16 RX ADMIN — MEROPENEM 1 G: 1 INJECTION, POWDER, FOR SOLUTION INTRAVENOUS at 02:31

## 2017-03-16 RX ADMIN — ERTAPENEM SODIUM 1 G: 1 INJECTION, POWDER, LYOPHILIZED, FOR SOLUTION INTRAMUSCULAR; INTRAVENOUS at 16:15

## 2017-03-16 RX ADMIN — MELATONIN TAB 3 MG 3 MG: 3 TAB at 19:34

## 2017-03-16 NOTE — PROGRESS NOTES
[facility]  GASTROENTEROLOGY PROGRESS NOTE  Jayson Bella [id number]   03/16/2017    IMPRESSION:  Jayson Bella is a 46 year old male with h/o past ETOH abuse, recent history significant for presumed gallstone pancreatitis (12/14/2016) s/p ERCP w sphincterotomy with biliary sludge (12/17/17), laparoscopic cholecystectomy (12/18/17), and ERCP with biliary stent (1/12/17),  transferred here from [facility] for further management of necrotizing pancreatitis with acute necrotic collections. Hospital course c/b ARF (resolved), ascites and pleural effusions s/p chest tube (now removed), ?infected ascites s/p meropenem.    3/1 Off chio developed drenching sweats, rising WBC thus restarted and CT w/IV contrast done. There is a maturing collection inferior to the stomach, partially loculated ascites, recurrent pleural effusion and immature LUQ necrotic collections and inflammation.   3/2 Ascites tap done with ~500 neutrophils and excessively high amylase/lipase c/w pancreatic ascites culture + for VRE.  3/3, Axios and solus placement in lesser sac collection. ERCP with sphincterotomy and biliary stent removal also done.   3/3 WON culture with candida  3/7 hematemesis, had EGD and necrosectomy with clot at base of Axios in the necrotic cavity. No visible vessel to treat but this was the source of bleeding. He had old blood in the duodenum as well. Axios removed, double pigtails placed in its absence.   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   3/15 IR embolization of splenic artery pseudoaneurysm.    Clinically improving. Need to optimize nutrition.     RECOMMENDATIONS:  -ADAT to low fat diet. Add meal supplements given weight loss. Discussed with patient and nurse, to add Boost and Magic Cup supplements  -Antimicrobials per ID. Currently on meropenem, linezolid and fluconazole with end date in ~3/21.   -PPI BID  -EUS with necrosectomy either  "Monday or Tuesday of next week if still inpatient, otherwise within 1-2 weeks as an outpatient. If needed, PEG-J can also be placed at that time     Patient discussed with GI staff, Dr. Cadena. Please page with questions.     Claribel Grider MD  GI Fellow  [phone]        SUBJECTIVE:  No acute overnight events. Wondering when he will be discharged. NJT removed yesterday, tolerated a small dinner. Pain is minimal. Having BMs. Losing weight.     OBJECTIVE:  VS: /90  Pulse 79  Temp 98.5  F (36.9  C) (Oral)  Resp 18  Ht 1.905 m (6' 3\")  Wt 103.3 kg (227 lb 11.8 oz)  SpO2 97%  BMI 28.46 kg/m2   GEN: NAD; comfortable.  HEENT: NCAT; NL voice.  Resp: Breathing comfortably  Abdominal: Soft; NTND; bowel sounds present.   MSK: No limitations in ROM.   Skin: No jaundice.  Extremities: Moving all extremities.   Neurological: AOx3.    REVIEW OF LABORATORY, PATHOLOGY AND IMAGING RESULTS:  BMP  Recent Labs  Lab 03/16/17  0605 03/14/17  1143 03/10/17  0552    137 141   POTASSIUM 3.5 4.0 3.7   CHLORIDE 104 101 106   YAN 8.2* 8.3* 8.0*   CO2 24 27 26   BUN 15 29 26   CR 0.71 0.68 0.82   GLC 81 98 108*     CBC  Recent Labs  Lab 03/16/17  0605 03/14/17  1143 03/14/17  0532 03/13/17  0530   WBC 8.9 10.0 10.5 11.3*   RBC 2.94* 2.83* 2.89* 2.94*   HGB 7.5* 7.3* 7.5* 7.5*   HCT 23.3* 22.9* 23.5* 24.0*   MCV 79 81 81 82   MCH 25.5* 25.8* 26.0* 25.5*   MCHC 32.2 31.9 31.9 31.3*   RDW 17.4* 17.3* 17.5* 17.6*    447 494* 563*     INR  Recent Labs  Lab 03/14/17  1143   INR 1.19*     LFTs  Recent Labs  Lab 03/14/17  1143 03/13/17  0530   ALKPHOS 83 85   AST 13 14   ALT 14 13   BILITOTAL 0.2 0.2   PROTTOTAL 7.4 7.4   ALBUMIN 2.0* 2.0*      PANC  Recent Labs  Lab 03/16/17  0605   LIPASE 246   AMYLASE 63           "

## 2017-03-16 NOTE — CONSULTS
Health Psychology                  Clinic    Department of Medicine  Anjali Lemus, Ph.D., L.P. [phone]                          Clin[facility] and [facility] Center  [facility] Maricruz Burnham, Ph.D.,  L.P. [phone]                 3rd Floor  [place] Mail Code [address]   Russel Chen, Ph.D., A.B.P.P., L.P. [phone]     [address] [place] [address] Valerie Arredondo, Ph.D., L.P. [phone]            [place] [address]           Susanne Traore, Ph.D., L.P. [phone]     Inpatient Health Psychology     Met briefly with patient and his wife to introduce myself and Health Psychology services.  He was interested in possibility that having someone to talk with might be helpful, but wants to think about it. Extended this offer for support to his wife as well.  Agreed that I will return later today to check in with him and provide more time should he be open to that.     Anjali Lemus, PhD, LP  [phone]

## 2017-03-16 NOTE — CONSULTS
Health Psychology                  Clinic    Department of Medicine  Anjali Lemus, Ph.D., L.P. [phone]                          Clinics and Surgery Center  [facility] Maricruz Burnham, Ph.D.,  L.P. [phone]                 3rd Floor  [place] Mail Code [id number]   Russel Chne, Ph.D., A.B.P.P., L.P. [phone]     [address] [place] [address] Valerie Arredondo, Ph.D., L.P. [phone]            [place] [address]           Susanne Traore, Ph.D., L.P. [phone]     Inpatient Health Psychology     2nd attempt to meet with patient and wife today.  They had another visitor and were not available.  If possible will attempt to meet with patient tomorrow or next week.     Anjali Lemus, PhD, LP  [phone]

## 2017-03-16 NOTE — PLAN OF CARE
Problem: Goal Outcome Summary  Goal: Goal Outcome Summary  PT 7D- pt seen for gait and LE exercise. Pt more fatigued as he has not done much or eaten for 2 days. Pt walked up to 420 feet without AD with SBA. Pt still had some veering off his path but no LOB. Pt has impaired higher level balance and LE weakness. He will benefit from OP PT at discharge.

## 2017-03-16 NOTE — PLAN OF CARE
Problem: Goal Outcome Summary  Goal: Goal Outcome Summary  Outcome: No Change     VSS, afebrile. C/o abdominal discomfort and generalized pain. Declined tylenol this shift. R groin puncture site CDI, 2+ R pedal pulse. Was sleepy most of shift likely r/t meds given at IR procedure.  No procedure tomorrow, primary team will discuss with family when this will be scheduled.  10 mEq IV potassium given for K+ 4.0. Pt c/o burning in IV and declined having it replaced, so no further K+ given by IV; pt not eating, so no oral K+ given. K+ recheck in AM. Voiding spontaneously. Will continue to monitor and with POC>

## 2017-03-16 NOTE — PLAN OF CARE
Problem: Pancreatitis, Acute/Chronic (Adult)  Goal: Signs and Symptoms of Listed Potential Problems Will be Absent or Manageable (Pancreatitis, Acute/Chronic)  Signs and symptoms of listed potential problems will be absent or manageable by discharge/transition of care (reference Pancreatitis, Acute/Chronic (Adult) CPG).   Outcome: Improving  Working on eating. Robert needs pancreatic enzymes when he eats food with fat in it. He has scheduled pancreatic enzymes and PRN for snacks. Meropenem discontinued and once a day Ertapenem will be started. May discharge Monday if he can eat. He is on calorie counts. Up independently. Denied pain or nausea.

## 2017-03-16 NOTE — PLAN OF CARE
Problem: Goal Outcome Summary  Goal: Goal Outcome Summary  Edema 7D: Recommend continued use of size F comperm compression stockings from MTPs to knee creases for continued edema management. Stockings can be worn 23/24 hours per day and should be removed daily for skin cares however remove completely if causing pain/numbness. See patient care order for details. Patient has meet all established goals for edema services, will DC with continued use of compression stockings.

## 2017-03-16 NOTE — PROGRESS NOTES
CLINICAL NUTRITION SERVICES - REASSESSMENT NOTE     Nutrition Prescription    RECOMMENDATIONS FOR MDs/PROVIDERS TO ORDER:  1. If low-fat dietary restriction is hindering pts PO intake and tolerating without abdominal pain, consider liberalizing to a regular diet to promote PO intake.   2. Encourage PO intake of meals/snacks/supplements. If unable to maintain PO intake x 3 days (1950 kcals, 115 g PRO) and experiencing further weight loss, recommend placing J-tube and resume previous tube feeding and PO enzymes (seen below).     Malnutrition Status:    Non-severe malnutrition in the context of chronic illness.    Recommendations already ordered by Registered Dietitian (RD):  1. Ordered Ensure Clear (both apple and berry for pt to try)   2. Calorie counts 3/16-3/19    Future/Additional Recommendations:  1. Monitor sx of malabsorption to determine need for alterations to PO enzyme dosing.      EVALUATION OF THE PROGRESS TOWARD GOALS   Diet: Low Fat diet     Intake: After NJ pulled 3/14, pt reported he ate a small amount of dinner (chicken, potato) and states he has not eaten since, but also reported he was NPO at midnight on 3/14 until 2pm on 3/15. Pt reported he continues to have no appetite, but thinks he is getting hungrier. He also reported that food does not taste good to him anymore. He finds food to have too bold of flavor. Pt also reports he is nervous to eat given his past hx of abdominal pain.       Nutrition Support: None currently. Pt expressed if tube feeds held it would stimulate an appetite and help him eat. Per MD discretion, NJ pulled 3/14 to challenge PO and if PO intake remains poor placing J-tube at time of necrosectomy.  -Previously was on cycled feeds of Impact Peptide 1.5 @ 115 mL/hr x 16 hours (cycled 4pm - 8am) to provide 1840 mL, 2760 kcals (27 kcal/kg), 173 g pro (1.7 g/kg), 118 g fat, 256 g CHO, 0 g fiber, and 1417 mL free water to meet 100% of assessed needs per updated dosing weight below.  Tube feed flushes of 60 mL q 4 hrs. Of note, pt was tolerating tube feeds at goal.   -Previously receiving Creon 24,000 3 capsules mixed with sodium bicarb q 4 hrs during tube feeds.    6-Day EN Average: 1303 mL; 1954 kcals, 122 g PRO (meeting 76% low-end kcal needs and 79% low-end protein needs)       NEW FINDINGS   Meds: Creon changed to PO on 3/15. 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 Date for next necrosectomy pending.     Weight: Wt continues to trend down this admit, currently 103.3 kg, which is the lowest weight this admit. 10% weight loss since last week, suspect r/t fluid loss as pt was previously diuresing with lasix with noted ascites per MD notes and net -11.6L and estimated dry weight at OSH of 105 kg. Likely true weight loss given inadequate nutrition this admit. Updated dosing weight below to reflect updated assessed needs.     REASSESSED NUTRITION NEEDS: per updated dosing wt 103 kg   Estimated Energy Needs: 2575 - 3090+ kcals/day (25 - 30+ kcals/kg)  Justification: Maintenance, potentially higher needs w/ necrotizing pancreatitis and recent severe weight loss  Estimated Protein Needs: 154 - 206 grams protein/day (1.5 - 2 grams of pro/kg)  Justification: Necrotizing pancreatitis and Hypercatabolism with acute illness, repletion   Estimated Fluid Needs: 1 mL/kcal or per MD pending fluid status  Justification: Maintenance    MALNUTRITION  % Intake: < 75% for > 7 days (non-severe)   % Weight Loss: > 2% in 1 week (severe)  Subcutaneous Fat Loss: Facial region, Upper arm and Thoracic/intercostal: mild (non-severe)  Muscle Loss: Facial & jaw region, Thoracic region (clavicle, acromium bone, deltoid, trapezius, pectoral) and Upper arm (bicep, tricep): mild (non-severe)   Fluid Accumulation/Edema: 1+ trace:  Mild (non-severe)   Malnutrition Diagnosis: Non-severe malnutrition in the context of chronic illness.     Previous Goals   1. Total avg nutritional intake (PO+EN) to meet a minimum of 25 kcal/kg and 1.5 g PRO/kg daily (per dosing wt 105 kg). - Goal Not Met   2. Wt not to decline < 120 kg - Goal Not Met, suspect combination of fluid and true weight loss.     Previous Nutrition Diagnosis  Inadequate protein-energy intake related to no appetite, fear of past hx of abdominal pain with eating, fatigue, and mild nausea and inadequate enteral infusion 2/2 advancement and cycling to goal and interruptions for procedures as evidenced by pt/pts wife report of continued minimal to no PO intakes, 5-day average EN meeting 70% estimated kcal needs and 73% estimated protein needs, and 5% weight loss since admit (2 weeks).   Evaluation: No change, updated below     CURRENT NUTRITION DIAGNOSIS  Inadequate protein-energy intake related to no appetite, fear of eating given past hx of abdominal pain with eating, fatigue, and altered taste hindering PO intake and inadequate enteral infusion 2/2 TF interruptions for procedures, now with NJ tube pulled 3/14 with no nutrition support as evidenced by continued minimal to no PO intakes since last week, 5-day average EN meeting 76% estimated kcal needs and 79% estimated protein needs now with no nutrition support x 2 days, and 10% weight loss x 1 week.     INTERVENTIONS  Implementation  Nutrition education: Encouraged PO intake since now receiving no nutrition from tube feeds. Discussed stimulating appetite with small, frequent meals, frequent snacks, and bringing in foods he likes from home. With altered taste, suggested ordering a wide variety of meal options to find items he enjoys. Provided low-fat menu options to give pt ideas of foods to order. Also provided pt with a high-calorie, high-protein meal plan to stimulate meal ideas and the menu with calorie content of food items to choose  high-calorie items allowed on low-fat diet. Also provided supplement list. Discussed enzyme orders are now changed to PO with meals and to monitor for signs of malabsorption to ensure correct enzyme dosing. Discussed discontinuing fiber supplement now that his tube feeds discontinued. Also discussed calorie counts.     Collaboration with other providers - Discussed with MD yesterday, plans to challenge PO intake and if PO intake remains poor, placing PEG-J tube at time of necrosectomy. Discussed changing enzyme regimen to PO now that tube feeds are discontinued.     Medical food supplement therapy - Ordered Ensure Clear (both apple and berry for pt to try)     Calorie counts 3/16-3/19    Goals  1. Per calorie counts, total avg nutritional intake to meet a minimum of 25 kcal/kg and 1.5 g PRO/kg daily (per dosing wt 104 kg).  2. Weight to remain >/= 103 kg.     Monitoring/Evaluation  Progress toward goals will be monitored and evaluated per protocol.    Juju Cardenas, Dietetic Intern     RD has read and agrees with above reassessment, interventions and recommendations.    Lidia Delacruz RD,LD  Pager [phone]

## 2017-03-16 NOTE — PROGRESS NOTES
"SPIRITUAL HEALTH SERVICES  SPIRITUAL ASSESSMENT Progress Note  [facility] ([place] 7D     PRIMARY FOCUS:     Goals of care    Symptom/pain management    Emotional/spiritual/Hindu distress    Support for coping    REFERRAL SOURCE: Follow-up visit    ILLNESS CIRCUMSTANCES:   Reviewed documentation. Reflective conversation shared with Jayson and Carolyne which integrated elements of illness and family narratives.     Context of Serious Illness/Symptom(s) - aJyson shared that he slept significantly post-procedure and has found it challenging to get up and going.     Resources for Support - Carolyne shared that her sister and dad are helping to care for the kids while they are away so the kids can have a sense of normalcy with school.     DISTRESS:     Emotional/Spiritual/Existential Distress - Some unknowns still on the path to returning home.     Mu-ism Distress - Not Discussed     Social/Cultural/Economic Distress - Being away from home the kids, spending five weeks eating out has been difficult. Want the comforts of home.     SPIRITUAL/Mandaen COPING:     Restorationist/Elaina - Not Discussed     Spiritual Practice(s) - Not Discussed     Emotional/Relational/Existential Connections - Coping is \"day by day\" and looking for the little steps of improvement in Jayson's health.     GOALS OF CARE:    Goals of Care - To go home.    Meaning/Sense-Making - Not Discussed     PLAN: I will follow up with Jayson 1-2x/week as he remains on the unit.     Belia Jurado  Chaplain Resident  Pager [phone]    "

## 2017-03-16 NOTE — PLAN OF CARE
Problem: Goal Outcome Summary  Goal: Goal Outcome Summary  Outcome: No Change  Patient Afebrile OVSS. Normal pedal pulses. Right groin site CDI. Having some groin pain and took tylenol which helped. IV running TKO but pt requested to be off iv- SL at 0400

## 2017-03-16 NOTE — PROGRESS NOTES
[facility]     Medicine Progress Note  Jayson Bella MRN: [id number]  1970  Date of Admission:2/25/2017  Date of Service: 03/16/2017  ___________________________________    Main Plans for Today   - Encourage PO diet  - Nutrition working with patient  - Will discuss with ID about antibiotic regimen for home  - Plan for necrosectomy next week    Assessment & Plan    Jayson Bella is a 46 year old White male with a past medical history significant for gallstone pancreatitis s/p cholecystectomy in 12/2016 c/b biliary obstruction d/t large pseudocyst s/p ERCP with stent placement 1/17 who was transferred from OSH with sepsis, fever, leukocytosis concerning for necrotizing pancreatitis and abscess formation s/p necrosectomy 3/7/17 and 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 Patient is clinically improving, but will require further necrosectomy next week.    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  - GI following  - Protonix bid  - Daily Hgb     # Hypervolemia- improved.  Ascites likely secondary to IVF in setting of sepsis and hypoalbuminemia  - recent weight of 106 kg on 2/13- currently very close to baseline  - strict I&O  - Daily weights  - K/M repletion protocols with goal 4/2     # Malnutrition  PO trial. If patient unable to take enough oral, will discuss   - Nutrition consult   - Creon PO  - Mag and phos with morning labs     # L sided exudative pleural effusion - chest tube removed 2/26. Resolved.     # Pain  # Insomnia  - Acetaminophen prn  - Melatonin 3 mg Qevening scheduled    # Depression, low mood  - Continue  mirtazapine 7.5mg   -  consult   - Psychology consult  ------------------------------------------------------------------------------------------------------------------  Prophylaxis:     -GI: PPI bid    -DVT: SCDs    FEN: Low fat diet  Code status: Full  Disposition: Pending clinical stability and improvement  =========================================================  Patient was discussed and evaluated with Dr. Ray Rivera MD  Internal Medicine/Pediatrics, PGY1    Interval History   Patient continues to struggle with his mood. He is working hard on eating more. He was about to meet with nutrition this morning. He complains of mild back pain today.   Review of Systems   Denies nausea, vomiting, abdominal pain. See interval history for pertinent positives.   Physical Exam   Vital Signs with Ranges  Temp:  [97.9  F (36.6  C)-99  F (37.2  C)] 97.9  F (36.6  C)  Pulse:  [79] 79  Heart Rate:  [73-97] 86  Resp:  [12-25] 18  BP: (118-156)/(71-99) 141/88  SpO2:  [93 %-100 %] 97 %  I/O last 3 completed shifts:  In: 600 [P.O.:400; I.V.:200]  Out: 300 [Urine:300]  Wt Readings from Last 1 Encounters:   03/16/17 103.3 kg (227 lb 11.8 oz)    Body mass index is 28.46 kg/(m^2). Resp: 18    General: Awake, alert, and oriented. NAD. Flat affect.  Eyes: Conjunctiva clear.  HEENT: Oropharynx is clear and moist.   Cardiovascular: Regular rate and rhythm, normal S1 and S2, and no murmur noted.   Respiratory: Clear to auscultation bilaterally. No wheezing or crackles.  GI: Soft, non-tender, non-distended.  Musculoskeletal: No edema.  Skin: Warm and dry, no rashes.   Neurologic:  A&O x3. Cranial nerves are grossly intact. No focal deficits.    Data   Labs & Studies of Note: I personally reviewed the following studies.    ROUTINE ICU LABS (Last four results)  CMP    Recent Labs  Lab 03/16/17  0605 03/14/17  1143 03/13/17  0530 03/10/17  0552    137  --  141   POTASSIUM 3.5 4.0  --  3.7   CHLORIDE 104 101  --  106    CO2 24 27  --  26   ANIONGAP 12 9  --  9   GLC 81 98  --  108*   BUN 15 29  --  26   CR 0.71 0.68  --  0.82   GFRESTIMATED >90Non  GFR Calc >90Non  GFR Calc  --  >90Non  GFR Calc   GFRESTBLACK >90African American GFR Calc >90African American GFR Calc  --  >90African American GFR Calc   YAN 8.2* 8.3*  --  8.0*   MAG 1.5*  --   --   --    PHOS 3.3  --   --  2.4*   PROTTOTAL  --  7.4 7.4  --    ALBUMIN  --  2.0* 2.0*  --    BILITOTAL  --  0.2 0.2  --    ALKPHOS  --  83 85  --    AST  --  13 14  --    ALT  --  14 13  --      CBC    Recent Labs  Lab 03/16/17  0605 03/14/17  1143 03/14/17  0532 03/13/17  0530   WBC 8.9 10.0 10.5 11.3*   RBC 2.94* 2.83* 2.89* 2.94*   HGB 7.5* 7.3* 7.5* 7.5*   HCT 23.3* 22.9* 23.5* 24.0*   MCV 79 81 81 82   MCH 25.5* 25.8* 26.0* 25.5*   MCHC 32.2 31.9 31.9 31.3*   RDW 17.4* 17.3* 17.5* 17.6*    447 494* 563*       Microbiology: no new data Medications   Medications list for Reference (delete if desired)  Current Facility-Administered Medications   Medication     Hold: Metformin and metformin containing medications on day of the procedure and for 48 hours after IV contrast given     amylase-lipase-protease (CREON 24) 09245 UNITS per EC capsule 72,000 Units     ondansetron (ZOFRAN) tablet 4 mg     multivitamin, therapeutic with minerals (THERA-VIT-M) tablet 1 tablet     senna-docusate (SENOKOT-S;PERICOLACE) 8.6-50 MG per tablet 2 tablet     pantoprazole (PROTONIX) EC tablet 40 mg     mirtazapine (REMERON) tablet TABS 7.5 mg     melatonin tablet 3 mg     fluconazole (DIFLUCAN) tablet 200 mg     linezolid (ZYVOX) tablet 600 mg     acetaminophen (TYLENOL) tablet 1,000 mg     sodium chloride (PF) 0.9% PF flush 3 mL     meropenem (MERREM) 1 g vial to attach to  mL bag     lidocaine 1 % 1 mL     lidocaine (LMX4) kit     sodium chloride (PF) 0.9% PF flush 3 mL     sodium chloride (PF) 0.9% PF flush 3 mL     May continue current IV fluids  if patient has IV fluids infusing.     May take regular AM medications except those listed below     sodium chloride (PF) 0.9% PF flush 3 mL     potassium chloride SA (K-DUR/KLOR-CON M) CR tablet 20-40 mEq     potassium chloride (KLOR-CON) Packet 20-40 mEq     potassium chloride 10 mEq in 100 mL intermittent infusion     potassium chloride 10 mEq in 100 mL intermittent infusion with 10 mg lidocaine     potassium chloride 20 mEq in 50 mL intermittent infusion     magnesium sulfate 2 g in NS intermittent infusion (PharMEDium or FV Cmpd)     magnesium sulfate 4 g in 100 mL sterile water (premade)     magnesium hydroxide (MILK OF MAGNESIA) suspension 30 mL     pneumococcal vaccine (PNEUMOVAX 23-edna) injection 0.5 mL     nicotine Patch in Place     nicotine patch REMOVAL     nicotine (NICODERM CQ) 14 MG/24HR 24 hr patch 1 patch     dextrose 10 % 1,000 mL infusion     naloxone (NARCAN) injection 0.1-0.4 mg

## 2017-03-17 ENCOUNTER — APPOINTMENT (OUTPATIENT)
Dept: PHYSICAL THERAPY | Facility: CLINIC | Age: 47
DRG: 871 | End: 2017-03-17
Attending: INTERNAL MEDICINE

## 2017-03-17 LAB
ERYTHROCYTE [DISTWIDTH] IN BLOOD BY AUTOMATED COUNT: 17.1 % (ref 10–15)
HCT VFR BLD AUTO: 23.4 % (ref 40–53)
HGB BLD-MCNC: 7.5 G/DL (ref 13.3–17.7)
MAGNESIUM SERPL-MCNC: 1.7 MG/DL (ref 1.6–2.3)
MCH RBC QN AUTO: 25.9 PG (ref 26.5–33)
MCHC RBC AUTO-ENTMCNC: 32.1 G/DL (ref 31.5–36.5)
MCV RBC AUTO: 81 FL (ref 78–100)
PHOSPHATE SERPL-MCNC: 3.1 MG/DL (ref 2.5–4.5)
PLATELET # BLD AUTO: 349 10E9/L (ref 150–450)
POTASSIUM SERPL-SCNC: 3 MMOL/L (ref 3.4–5.3)
POTASSIUM SERPL-SCNC: 3.5 MMOL/L (ref 3.4–5.3)
RBC # BLD AUTO: 2.9 10E12/L (ref 4.4–5.9)
WBC # BLD AUTO: 8.6 10E9/L (ref 4–11)

## 2017-03-17 PROCEDURE — 36415 COLL VENOUS BLD VENIPUNCTURE: CPT | Performed by: STUDENT IN AN ORGANIZED HEALTH CARE EDUCATION/TRAINING PROGRAM

## 2017-03-17 PROCEDURE — 84100 ASSAY OF PHOSPHORUS: CPT | Performed by: INTERNAL MEDICINE

## 2017-03-17 PROCEDURE — 97110 THERAPEUTIC EXERCISES: CPT | Mod: GP

## 2017-03-17 PROCEDURE — 25000132 ZZH RX MED GY IP 250 OP 250 PS 637: Performed by: STUDENT IN AN ORGANIZED HEALTH CARE EDUCATION/TRAINING PROGRAM

## 2017-03-17 PROCEDURE — 40000193 ZZH STATISTIC PT WARD VISIT

## 2017-03-17 PROCEDURE — 25000132 ZZH RX MED GY IP 250 OP 250 PS 637: Performed by: INTERNAL MEDICINE

## 2017-03-17 PROCEDURE — 36415 COLL VENOUS BLD VENIPUNCTURE: CPT | Performed by: INTERNAL MEDICINE

## 2017-03-17 PROCEDURE — 25000128 H RX IP 250 OP 636: Performed by: STUDENT IN AN ORGANIZED HEALTH CARE EDUCATION/TRAINING PROGRAM

## 2017-03-17 PROCEDURE — 85027 COMPLETE CBC AUTOMATED: CPT | Performed by: STUDENT IN AN ORGANIZED HEALTH CARE EDUCATION/TRAINING PROGRAM

## 2017-03-17 PROCEDURE — 83735 ASSAY OF MAGNESIUM: CPT | Performed by: INTERNAL MEDICINE

## 2017-03-17 PROCEDURE — 99232 SBSQ HOSP IP/OBS MODERATE 35: CPT | Mod: GC | Performed by: INTERNAL MEDICINE

## 2017-03-17 PROCEDURE — 12000008 ZZH R&B INTERMEDIATE UMMC

## 2017-03-17 PROCEDURE — 84132 ASSAY OF SERUM POTASSIUM: CPT | Performed by: INTERNAL MEDICINE

## 2017-03-17 RX ORDER — METRONIDAZOLE 500 MG/1
500 TABLET ORAL EVERY 8 HOURS SCHEDULED
Status: DISCONTINUED | OUTPATIENT
Start: 2017-03-17 | End: 2017-03-18 | Stop reason: HOSPADM

## 2017-03-17 RX ORDER — CIPROFLOXACIN 500 MG/1
500 TABLET, FILM COATED ORAL EVERY 12 HOURS SCHEDULED
Status: DISCONTINUED | OUTPATIENT
Start: 2017-03-17 | End: 2017-03-18 | Stop reason: HOSPADM

## 2017-03-17 RX ADMIN — ACETAMINOPHEN 1000 MG: 325 TABLET, FILM COATED ORAL at 20:21

## 2017-03-17 RX ADMIN — METRONIDAZOLE 500 MG: 500 TABLET ORAL at 22:07

## 2017-03-17 RX ADMIN — CIPROFLOXACIN HYDROCHLORIDE 500 MG: 500 TABLET, FILM COATED ORAL at 10:50

## 2017-03-17 RX ADMIN — LINEZOLID 600 MG: 600 TABLET, FILM COATED ORAL at 20:12

## 2017-03-17 RX ADMIN — LINEZOLID 600 MG: 600 TABLET, FILM COATED ORAL at 09:13

## 2017-03-17 RX ADMIN — MULTIPLE VITAMINS W/ MINERALS TAB 1 TABLET: TAB at 22:08

## 2017-03-17 RX ADMIN — PANTOPRAZOLE SODIUM 40 MG: 40 TABLET, DELAYED RELEASE ORAL at 09:13

## 2017-03-17 RX ADMIN — NICOTINE 1 PATCH: 14 PATCH, EXTENDED RELEASE TRANSDERMAL at 09:13

## 2017-03-17 RX ADMIN — METRONIDAZOLE 500 MG: 500 TABLET ORAL at 14:30

## 2017-03-17 RX ADMIN — FLUCONAZOLE 200 MG: 200 TABLET ORAL at 09:13

## 2017-03-17 RX ADMIN — MELATONIN TAB 3 MG 3 MG: 3 TAB at 20:12

## 2017-03-17 RX ADMIN — CIPROFLOXACIN HYDROCHLORIDE 500 MG: 500 TABLET, FILM COATED ORAL at 20:12

## 2017-03-17 RX ADMIN — POTASSIUM CHLORIDE 20 MEQ: 750 TABLET, EXTENDED RELEASE ORAL at 14:30

## 2017-03-17 RX ADMIN — PANTOPRAZOLE SODIUM 40 MG: 40 TABLET, DELAYED RELEASE ORAL at 15:56

## 2017-03-17 RX ADMIN — POTASSIUM CHLORIDE 40 MEQ: 750 TABLET, EXTENDED RELEASE ORAL at 12:35

## 2017-03-17 RX ADMIN — PANCRELIPASE 72000 UNITS: 24000; 76000; 120000 CAPSULE, DELAYED RELEASE PELLETS ORAL at 09:13

## 2017-03-17 RX ADMIN — ACETAMINOPHEN 1000 MG: 325 TABLET, FILM COATED ORAL at 03:13

## 2017-03-17 RX ADMIN — MIRTAZAPINE 7.5 MG: 7.5 TABLET, FILM COATED ORAL at 22:08

## 2017-03-17 ASSESSMENT — PAIN DESCRIPTION - DESCRIPTORS: DESCRIPTORS: SORE;ACHING;OTHER (COMMENT)

## 2017-03-17 NOTE — PLAN OF CARE
Problem: Goal Outcome Summary  Goal: Goal Outcome Summary  Outcome: No Change     VSS, afebrile.  C/o abdominal discomfort, Tylenol given with relief. Walked to [employer] cafeteria w/spouse, was only able to eat a few mouthfuls of mashed potatoes w/gravy and turkey; gave 1 capsule of Creon @ pt request due to small meal. Needs encouragement to eat. On calorie counts. IV abx switched to ertapenem, once daily. May discharge on Monday if eating well enough. Continue to monitor and with POC.

## 2017-03-17 NOTE — PLAN OF CARE
"Problem: Goal Outcome Summary  Goal: Goal Outcome Summary  Outcome: No Change  /70  Pulse 79  Temp 97.9  F (36.6  C) (Oral)  Resp 16  Ht 1.905 m (6' 3\")  Wt 103.3 kg (227 lb 11.8 oz)  SpO2 96%  BMI 28.46 kg/m2     A&Ox4. VSS on room air. Up ad droian. PIV saline locked. Pt c/o groin pain and tylenol was given x1 with relief. On low fat diet and calorie counts. Voiding spontaneously, not saving. Compression stockings on. Pt appeared to rest comfortably throughout the night. Possible discharge Monday. Will continue to monitor and follow POC.       "

## 2017-03-17 NOTE — PROGRESS NOTES
[facility]     Medicine Progress Note  Jayson Bella MRN: [id number]  1970  Date of Admission:2/25/2017  Date of Service: 03/17/2017  ___________________________________    Main Plans for Today   - Repeat necrosectomy on Tuesday per GI  - Continue working on PO intake  - Change ertapenem to cipro/flagyl with anticipation of discharge    Assessment & Plan    Jayson Bella is a 46 year old White male with a past medical history significant for gallstone pancreatitis s/p cholecystectomy in 12/2016 c/b biliary obstruction d/t large pseudocyst s/p ERCP with stent placement 1/17 who was transferred from OSH with sepsis, fever, leukocytosis concerning for necrotizing pancreatitis and abscess formation s/p necrosectomy 3/7/17 and 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 Patient is clinically improving, but will require further necrosectomy next week.    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  - GI following  - Protonix bid  - Continue daily Hgb     # Hypervolemia- improved.  Ascites likely secondary to IVF in setting of sepsis and hypoalbuminemia  - recent weight of 106 kg on 2/13- currently very close to baseline  - Strict I&O  - Daily weights  - K/M repletion protocols with goal 4/2     # Malnutrition  PO trial. If patient unable to take enough oral, will discuss PEG tube with necrosectomy  - Nutrition consult   - Creon PO     # L sided exudative pleural effusion - chest tube removed 2/26. Resolved.     # Pain  # Insomnia  - Acetaminophen prn  - Melatonin 3 mg Qevening scheduled    # Depression, low mood  - Mirtazapine  7.5mg   -  consult   - Psychology consult  ------------------------------------------------------------------------------------------------------------------  Prophylaxis:     -GI: PPI bid    -DVT: SCDs    FEN: Low fat diet  Code status: Full  Disposition: Pending clinical stability and improvement  =========================================================  Patient was discussed and evaluated with Dr. Ray Rivera MD  Internal Medicine/Pediatrics, PGY1    Interval History   No acute events overnight. He denies any pain today. Patient is working hard to increase his oral intake. Normal bowel movements and adequate urine output.  Review of Systems   Denies nausea, vomiting, abdominal pain, chest pain. Appetite increasing.  Physical Exam   Vital Signs with Ranges  Temp:  [97.6  F (36.4  C)-98.5  F (36.9  C)] 98  F (36.7  C)  Heart Rate:  [75-83] 81  Resp:  [16-18] 18  BP: (125-141)/(65-89) 140/87  SpO2:  [95 %-97 %] 97 %  I/O last 3 completed shifts:  In: 940 [P.O.:840; I.V.:100]  Out: -   Wt Readings from Last 1 Encounters:   03/17/17 103.9 kg (229 lb)    Body mass index is 28.62 kg/(m^2). Resp: 18    General: Awake, alert, pleasant. NAD.  Eyes: Conjunctiva clear.  HEENT: Oropharynx is clear and moist.   Cardiovascular: Regular rate and rhythm, normal S1 and S2, and no murmur noted.   Respiratory: Clear to auscultation bilaterally. No wheezing or crackles.  GI: Soft, non-tender, non-distended.  Musculoskeletal: No edema.  Skin: Warm and dry, no rashes.   Neurologic:  A&O x3. Cranial nerves are grossly intact. No focal deficits.    Data   Labs & Studies of Note: I personally reviewed the following studies.    ROUTINE ICU LABS (Last four results)  CMP    Recent Labs  Lab 03/17/17  1115 03/16/17  0605 03/14/17  1143 03/13/17  0530   NA  --  140 137  --    POTASSIUM 3.0* 3.5 4.0  --    CHLORIDE  --  104 101  --    CO2  --  24 27  --    ANIONGAP  --  12 9  --    GLC  --  81 98  --    BUN  --  15 29  --     CR  --  0.71 0.68  --    GFRESTIMATED  --  >90Non  GFR Calc >90Non  GFR Calc  --    GFRESTBLACK  --  >90African American GFR Calc >90African American GFR Calc  --    YAN  --  8.2* 8.3*  --    MAG 1.7 1.5*  --   --    PHOS 3.1 3.3  --   --    PROTTOTAL  --   --  7.4 7.4   ALBUMIN  --   --  2.0* 2.0*   BILITOTAL  --   --  0.2 0.2   ALKPHOS  --   --  83 85   AST  --   --  13 14   ALT  --   --  14 13     CBC    Recent Labs  Lab 03/17/17  0650 03/16/17  0605 03/14/17  1143 03/14/17  0532   WBC 8.6 8.9 10.0 10.5   RBC 2.90* 2.94* 2.83* 2.89*   HGB 7.5* 7.5* 7.3* 7.5*   HCT 23.4* 23.3* 22.9* 23.5*   MCV 81 79 81 81   MCH 25.9* 25.5* 25.8* 26.0*   MCHC 32.1 32.2 31.9 31.9   RDW 17.1* 17.4* 17.3* 17.5*    375 447 494*       Microbiology: no new data Medications   Medications list for Reference (delete if desired)  Current Facility-Administered Medications   Medication     ciprofloxacin (CIPRO) tablet 500 mg     metroNIDAZOLE (FLAGYL) tablet 500 mg     potassium chloride SA (K-DUR/KLOR-CON M) CR tablet 20-40 mEq     potassium chloride (KLOR-CON) Packet 20-40 mEq     potassium chloride 10 mEq in 100 mL intermittent infusion     potassium chloride 10 mEq in 100 mL intermittent infusion with 10 mg lidocaine     potassium chloride 20 mEq in 50 mL intermittent infusion     magnesium sulfate 4 g in 100 mL sterile water (premade)     potassium phosphate 15 mmol in D5W 250 mL intermittent infusion     potassium phosphate 20 mmol in D5W 500 mL intermittent infusion     potassium phosphate 20 mmol in D5W 250 mL intermittent infusion     potassium phosphate 25 mmol in D5W 500 mL intermittent infusion     amylase-lipase-protease (CREON 24) 05328 UNITS per EC capsule 72,000 Units     ondansetron (ZOFRAN) tablet 4 mg     multivitamin, therapeutic with minerals (THERA-VIT-M) tablet 1 tablet     senna-docusate (SENOKOT-S;PERICOLACE) 8.6-50 MG per tablet 2 tablet     pantoprazole (PROTONIX) EC tablet  40 mg     mirtazapine (REMERON) tablet TABS 7.5 mg     melatonin tablet 3 mg     fluconazole (DIFLUCAN) tablet 200 mg     linezolid (ZYVOX) tablet 600 mg     acetaminophen (TYLENOL) tablet 1,000 mg     sodium chloride (PF) 0.9% PF flush 3 mL     lidocaine 1 % 1 mL     lidocaine (LMX4) kit     sodium chloride (PF) 0.9% PF flush 3 mL     sodium chloride (PF) 0.9% PF flush 3 mL     May continue current IV fluids if patient has IV fluids infusing.     May take regular AM medications except those listed below     sodium chloride (PF) 0.9% PF flush 3 mL     magnesium hydroxide (MILK OF MAGNESIA) suspension 30 mL     pneumococcal vaccine (PNEUMOVAX 23-edna) injection 0.5 mL     nicotine Patch in Place     nicotine patch REMOVAL     nicotine (NICODERM CQ) 14 MG/24HR 24 hr patch 1 patch     dextrose 10 % 1,000 mL infusion     naloxone (NARCAN) injection 0.1-0.4 mg

## 2017-03-17 NOTE — PLAN OF CARE
Problem: Goal Outcome Summary  Goal: Goal Outcome Summary  Outcome: Improving  Alert and oriented x 4. Afebrile, VSS. Denied pain. C/O discomfort right groin, declined intervention. Denied nausea. Up ad dorian in room and halls. Fair po intake, continues on calorie counts (calorie goal 1,950 q day). Voiding without difficulty (not saving to be measured). LBM 3/16. Potassium replaced, recheck 3.5 (no further intervention required). IV Ertapenem changed to po flagyl and ciprofloxacin in preparation to discharge. Scheduled to have repeat necrosectomy Tuesday. Pt's wife at bedside and attentive to pt. Notify MD with any concerns and or changes in pt's condition. Continue with plan of care.

## 2017-03-17 NOTE — PROGRESS NOTES
Care Coordinator- Discharge Planning     Admission Date/Time:  2/25/2017  Attending MD:  Belia Bell MD     Data  Chart reviewed, discussed with interdisciplinary team.   Patient was admitted for:   1. Acute pancreatitis, unspecified complication status, unspecified pancreatitis type         Assessment  Full assessment completed in previous note    Per MD team, pt may be able to discharge over the weekend. Pt will have a necrosectomy at the end of next week. Pt and family plan on staying locally until next necrosectomy. At this time, pt does not have a feeding tube and will not need enteral nutrition for discharge. Updated Luciana enteral clinical liaison at [facility], of this. [facility]phone: [phone] and fax: [phone]) to notify that pt will not need home care at this time. Will continue to follow plan of care and assist with discharge planning as needed.     Plan  Anticipated Discharge Date:  2-3 days  Anticipated Discharge Plan:  Pt will discharge to [facility] with spouse until next necrosectomy.       Caitie Bell RN

## 2017-03-17 NOTE — PROGRESS NOTES
Calorie Counts    Intake Recorded For: 3/16 Kcals: 674 Protein: 38g    # Meals Recorded: 2 meals    (First: 100% peanut butter, 4 oz apple juice, popsicle, 7 ounces root beer)    (Second: 75% of 1% milk, 10% turkey, mashed potatoes with gravy)    # Supplements Recorded: 0

## 2017-03-17 NOTE — PROGRESS NOTES
[facility]  GASTROENTEROLOGY PROGRESS NOTE  Jayson Bella [id number]   03/17/2017    IMPRESSION:  Jayson Bella is a 46 year old male with h/o past ETOH abuse, recent history significant for presumed gallstone pancreatitis (12/14/2016) s/p ERCP w sphincterotomy with biliary sludge (12/17/17), laparoscopic cholecystectomy (12/18/17), and ERCP with biliary stent (1/12/17),  transferred here from [place] for further management of necrotizing pancreatitis with acute necrotic collections. Hospital course c/b ARF (resolved), ascites and pleural effusions s/p chest tube (now removed), ?infected ascites s/p meropenem.    3/1 Off chio developed drenching sweats, rising WBC thus restarted and CT w/IV contrast done. There is a maturing collection inferior to the stomach, partially loculated ascites, recurrent pleural effusion and immature LUQ necrotic collections and inflammation.   3/2 Ascites tap done with ~500 neutrophils and excessively high amylase/lipase c/w pancreatic ascites culture + for VRE.  3/3, Axios and solus placement in lesser sac collection. ERCP with sphincterotomy and biliary stent removal also done.   3/3 WON culture with candida  3/7 hematemesis, had EGD and necrosectomy with clot at base of Axios in the necrotic cavity. No visible vessel to treat but this was the source of bleeding. He had old blood in the duodenum as well. Axios removed, double pigtails placed in its absence.   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   3/15 IR embolization of splenic artery pseudoaneurysm.    Clinically improving. Need to optimize nutrition.     RECOMMENDATIONS:  -ADAT to low fat diet. Add meal supplements given weight loss. Discussed with patient and nurse, to add Boost and Magic Cup supplements  -Antimicrobials per ID. Currently on meropenem, linezolid and fluconazole with end date of ~3/21.   -PPI BID  -Will scheduled EUS with  "necrosectomy Tuesday of next week. If needed, PEG-J can also be placed at that time     Discussed with primary team    Patient discussed with GI staff, Dr. Lamb. Please page with questions.     Letha Flores PA-C  Interventional Gastroenterology/Pancreaticobiliary Service  Pager [phone]      ==============================================================    SUBJECTIVE:  No acute overnight events. Tolerating small meals, doesn't appear to be meeting caloric requirements. Discussed working on nutrition over the weekend and planning for repeat necrosectomy Tuesday next week. If fails yan counts over the weekend will plan to place PEG-J as well.    OBJECTIVE:  VS: /87  Pulse 79  Temp 98  F (36.7  C) (Oral)  Resp 18  Ht 1.905 m (6' 3\")  Wt 103.9 kg (229 lb)  SpO2 97%  BMI 28.62 kg/m2   GEN: NAD; comfortable.  Abdominal: Soft; NTND; bowel sounds present.   Skin: No jaundice.  Extremities: Moving all extremities.   Neurological: AOx3.    REVIEW OF LABORATORY, PATHOLOGY AND IMAGING RESULTS:  BMP    Recent Labs  Lab 03/17/17  1115 03/16/17  0605 03/14/17  1143   NA  --  140 137   POTASSIUM 3.0* 3.5 4.0   CHLORIDE  --  104 101   YAN  --  8.2* 8.3*   CO2  --  24 27   BUN  --  15 29   CR  --  0.71 0.68   GLC  --  81 98     CBC    Recent Labs  Lab 03/17/17  0650 03/16/17  0605 03/14/17  1143 03/14/17  0532   WBC 8.6 8.9 10.0 10.5   RBC 2.90* 2.94* 2.83* 2.89*   HGB 7.5* 7.5* 7.3* 7.5*   HCT 23.4* 23.3* 22.9* 23.5*   MCV 81 79 81 81   MCH 25.9* 25.5* 25.8* 26.0*   MCHC 32.1 32.2 31.9 31.9   RDW 17.1* 17.4* 17.3* 17.5*    375 447 494*     INR    Recent Labs  Lab 03/14/17  1143   INR 1.19*     LFTs    Recent Labs  Lab 03/14/17  1143 03/13/17  0530   ALKPHOS 83 85   AST 13 14   ALT 14 13   BILITOTAL 0.2 0.2   PROTTOTAL 7.4 7.4   ALBUMIN 2.0* 2.0*      PANC    Recent Labs  Lab 03/16/17  0605   LIPASE 246   AMYLASE 63           "

## 2017-03-18 VITALS
WEIGHT: 224.21 LBS | HEIGHT: 75 IN | BODY MASS INDEX: 27.88 KG/M2 | TEMPERATURE: 98.5 F | OXYGEN SATURATION: 97 % | SYSTOLIC BLOOD PRESSURE: 147 MMHG | HEART RATE: 83 BPM | RESPIRATION RATE: 18 BRPM | DIASTOLIC BLOOD PRESSURE: 91 MMHG

## 2017-03-18 LAB
ANION GAP SERPL CALCULATED.3IONS-SCNC: 10 MMOL/L (ref 3–14)
BUN SERPL-MCNC: 8 MG/DL (ref 7–30)
CALCIUM SERPL-MCNC: 8.2 MG/DL (ref 8.5–10.1)
CHLORIDE SERPL-SCNC: 106 MMOL/L (ref 94–109)
CO2 SERPL-SCNC: 25 MMOL/L (ref 20–32)
CREAT SERPL-MCNC: 0.72 MG/DL (ref 0.66–1.25)
ERYTHROCYTE [DISTWIDTH] IN BLOOD BY AUTOMATED COUNT: 17.2 % (ref 10–15)
GFR SERPL CREATININE-BSD FRML MDRD: ABNORMAL ML/MIN/1.7M2
GLUCOSE SERPL-MCNC: 90 MG/DL (ref 70–99)
HCT VFR BLD AUTO: 23.3 % (ref 40–53)
HGB BLD-MCNC: 7.5 G/DL (ref 13.3–17.7)
MCH RBC QN AUTO: 25.4 PG (ref 26.5–33)
MCHC RBC AUTO-ENTMCNC: 32.2 G/DL (ref 31.5–36.5)
MCV RBC AUTO: 79 FL (ref 78–100)
PLATELET # BLD AUTO: 360 10E9/L (ref 150–450)
POTASSIUM SERPL-SCNC: 3.8 MMOL/L (ref 3.4–5.3)
RBC # BLD AUTO: 2.95 10E12/L (ref 4.4–5.9)
SODIUM SERPL-SCNC: 140 MMOL/L (ref 133–144)
WBC # BLD AUTO: 8.1 10E9/L (ref 4–11)

## 2017-03-18 PROCEDURE — 25000132 ZZH RX MED GY IP 250 OP 250 PS 637: Performed by: INTERNAL MEDICINE

## 2017-03-18 PROCEDURE — 85027 COMPLETE CBC AUTOMATED: CPT | Performed by: STUDENT IN AN ORGANIZED HEALTH CARE EDUCATION/TRAINING PROGRAM

## 2017-03-18 PROCEDURE — 80048 BASIC METABOLIC PNL TOTAL CA: CPT | Performed by: STUDENT IN AN ORGANIZED HEALTH CARE EDUCATION/TRAINING PROGRAM

## 2017-03-18 PROCEDURE — 90732 PPSV23 VACC 2 YRS+ SUBQ/IM: CPT | Performed by: HOSPITALIST

## 2017-03-18 PROCEDURE — 25000132 ZZH RX MED GY IP 250 OP 250 PS 637: Performed by: STUDENT IN AN ORGANIZED HEALTH CARE EDUCATION/TRAINING PROGRAM

## 2017-03-18 PROCEDURE — 25000128 H RX IP 250 OP 636: Performed by: HOSPITALIST

## 2017-03-18 PROCEDURE — 36592 COLLECT BLOOD FROM PICC: CPT | Performed by: STUDENT IN AN ORGANIZED HEALTH CARE EDUCATION/TRAINING PROGRAM

## 2017-03-18 PROCEDURE — 99238 HOSP IP/OBS DSCHRG MGMT 30/<: CPT | Mod: GC | Performed by: INTERNAL MEDICINE

## 2017-03-18 RX ORDER — MULTIPLE VITAMINS W/ MINERALS TAB 9MG-400MCG
1 TAB ORAL DAILY
Qty: 30 EACH | Refills: 3 | Status: SHIPPED
Start: 2017-03-18

## 2017-03-18 RX ORDER — MIRTAZAPINE 7.5 MG/1
7.5 TABLET, FILM COATED ORAL AT BEDTIME
Qty: 60 TABLET | Refills: 1 | Status: SHIPPED
Start: 2017-03-18 | End: 2017-03-18

## 2017-03-18 RX ORDER — LANOLIN ALCOHOL/MO/W.PET/CERES
3 CREAM (GRAM) TOPICAL AT BEDTIME
Qty: 30 TABLET | Refills: 1 | Status: SHIPPED
Start: 2017-03-18

## 2017-03-18 RX ORDER — ACETAMINOPHEN 500 MG
500-1000 TABLET ORAL EVERY 6 HOURS PRN
Qty: 30 TABLET | Refills: 1 | Status: SHIPPED
Start: 2017-03-18

## 2017-03-18 RX ORDER — METRONIDAZOLE 500 MG/1
500 TABLET ORAL 3 TIMES DAILY
Qty: 21 TABLET | Refills: 0 | Status: SHIPPED
Start: 2017-03-18 | End: 2017-03-22

## 2017-03-18 RX ORDER — PANTOPRAZOLE SODIUM 40 MG/1
40 TABLET, DELAYED RELEASE ORAL
Qty: 30 TABLET | Refills: 1 | Status: SHIPPED
Start: 2017-03-18

## 2017-03-18 RX ORDER — MIRTAZAPINE 7.5 MG/1
7.5 TABLET, FILM COATED ORAL AT BEDTIME
Qty: 30 TABLET | Refills: 3 | Status: SHIPPED
Start: 2017-03-18

## 2017-03-18 RX ORDER — CIPROFLOXACIN 500 MG/1
500 TABLET, FILM COATED ORAL 2 TIMES DAILY
Qty: 14 TABLET | Refills: 0 | Status: SHIPPED
Start: 2017-03-18 | End: 2017-03-22

## 2017-03-18 RX ORDER — LINEZOLID 600 MG/1
600 TABLET, FILM COATED ORAL EVERY 12 HOURS
Qty: 14 TABLET | Refills: 0 | Status: SHIPPED
Start: 2017-03-18 | End: 2017-03-22

## 2017-03-18 RX ORDER — LANOLIN ALCOHOL/MO/W.PET/CERES
3 CREAM (GRAM) TOPICAL AT BEDTIME
Qty: 30 TABLET | Refills: 1 | Status: SHIPPED
Start: 2017-03-18 | End: 2017-03-18

## 2017-03-18 RX ORDER — ONDANSETRON 4 MG/1
4 TABLET, FILM COATED ORAL EVERY 6 HOURS PRN
Qty: 20 TABLET | Refills: 1 | Status: SHIPPED
Start: 2017-03-18

## 2017-03-18 RX ORDER — FLUCONAZOLE 200 MG/1
200 TABLET ORAL DAILY
Qty: 7 TABLET | Refills: 0 | Status: SHIPPED
Start: 2017-03-18 | End: 2017-03-22

## 2017-03-18 RX ADMIN — FLUCONAZOLE 200 MG: 200 TABLET ORAL at 08:44

## 2017-03-18 RX ADMIN — ACETAMINOPHEN 1000 MG: 325 TABLET, FILM COATED ORAL at 11:11

## 2017-03-18 RX ADMIN — METRONIDAZOLE 500 MG: 500 TABLET ORAL at 07:07

## 2017-03-18 RX ADMIN — PANTOPRAZOLE SODIUM 40 MG: 40 TABLET, DELAYED RELEASE ORAL at 08:44

## 2017-03-18 RX ADMIN — ACETAMINOPHEN 1000 MG: 325 TABLET, FILM COATED ORAL at 02:42

## 2017-03-18 RX ADMIN — LINEZOLID 600 MG: 600 TABLET, FILM COATED ORAL at 08:44

## 2017-03-18 RX ADMIN — PNEUMOCOCCAL VACCINE POLYVALENT 0.5 ML
25; 25; 25; 25; 25; 25; 25; 25; 25; 25; 25; 25; 25; 25; 25; 25; 25; 25; 25; 25; 25; 25; 25 INJECTION, SOLUTION INTRAMUSCULAR; SUBCUTANEOUS at 12:18

## 2017-03-18 RX ADMIN — CIPROFLOXACIN HYDROCHLORIDE 500 MG: 500 TABLET, FILM COATED ORAL at 08:44

## 2017-03-18 ASSESSMENT — PAIN DESCRIPTION - DESCRIPTORS: DESCRIPTORS: SORE

## 2017-03-18 NOTE — PLAN OF CARE
Alert and oriented x4. Afebrile. VSS. C/O with discomfort in abdomen & right groin area. Tylenol PRN given with relief. Denied nausea/vomiting. Ate dinner with wife and siblings. Calorie counts continue. Continue to void well. Offers no other complaints at this time. Necrosectomy scheduled for Tuesday.

## 2017-03-18 NOTE — PROGRESS NOTES
Calorie Counts  Intake recorded for: 3/17   Kcals: 2688  Protein: 50g  # Meals Recorded: 100% 2 meals + snacks  # Supplements Recorded: 100% Magic Cup, 50% Ensure Clear

## 2017-03-18 NOTE — PROGRESS NOTES
Discharge to Home:    D: Orders for discharge and outpatient medications written. Pt verbalized readiness to discharge to home this afternoon.  I: PIV discontinued per protocol. Home medications and return to clinic schedule reviewed with patient and his wife. Discharge instructions and parameters for calling Health Care Provider reviewed. Patient ambulated off of the unit at 12:50 pm accompanied by his wife.   A: Patient/wife verbalized understanding of all discharge medications and discharge paperwork and was ready for discharge.   P: Patient instructed to  medications at the discharge pharmacy prior to leaving the hospital. Pt to follow up as scheduled/documented on discharge paperwork.

## 2017-03-18 NOTE — PLAN OF CARE
Problem: Goal Outcome Summary  Goal: Goal Outcome Summary  Outcome: No Change  Tired and slept most of the shift. Stated that right groin was sore and took tylenol x 1. Site intact without hematoma. Pedal pulses normal. Up independently to the BR stated that he voided.

## 2017-03-20 ENCOUNTER — TELEPHONE (OUTPATIENT)
Dept: OTHER | Facility: CLINIC | Age: 47
End: 2017-03-20

## 2017-03-20 ENCOUNTER — CARE COORDINATION (OUTPATIENT)
Dept: CARDIOLOGY | Facility: CLINIC | Age: 47
End: 2017-03-20

## 2017-03-20 ENCOUNTER — TELEPHONE (OUTPATIENT)
Dept: INTERVENTIONAL RADIOLOGY/VASCULAR | Facility: CLINIC | Age: 47
End: 2017-03-20

## 2017-03-20 NOTE — TELEPHONE ENCOUNTER
Called patient this morning to check in on his nutrition. He is scheduled for repeat EGD with Necrosectomy +/- PEG-J placement tomorrow 3/21 with Dr. Lamb.     We discussed his nutrition status - he reports continued poor appetite. He had pancakes, granola bar, ham and cheese sandwich and root beer yesterday. He did not take in any supplement drinks such as Ensure or Boost either. At this time he wants to wait for the PEG-J tube and believes his appetite will come back with time. We talked about how important nutrition is in the healing process for necrotizing pancreatitis. I did let him know that he could discuss this further with Dr. Lamb tomorrow in pre-op but ultimately that patient does not want to have the PEG-J tube placed tomorrow.    Procedure scheduled for 1635 tomorrow. Explained what time to arrive, NPO at midnight tonight. Unknown if he will be admitted to the hospital overnight - depends on results of procedure.    At this time no repeat CT scan will be needed prior to procedure (will discuss with Dr. Lamb today)    All questions were answered and patient was grateful for the phone call.    Letha Flores PA-C  Advanced Endoscopy/Pancreaticobiliary Service  Pager [phone]

## 2017-03-20 NOTE — PROGRESS NOTES
Patient is a same day surgery patient and will have procedure tomorrow so no post DC follow up call will be made at this time        Follow-up Appointments           [facility] Follow-up and recommended labs and tests       Follow up for repeat necrosectomy on Tuesday, March 21.                        Your next 10 appointments already scheduled            Mar 21, 2017   Procedure with Pawel Lamb MD   [facility], [place], Same Day Surgery (--)     [address] [address] [phone]

## 2017-03-20 NOTE — PLAN OF CARE
Problem: Goal Outcome Summary  Goal: Goal Outcome Summary  Physical Therapy Discharge Summary     Reason for therapy discharge:    Discharged to home.     Progress towards therapy goal(s). See goals on Care Plan in [facility] electronic health record for goal details.  Goals partially met.  Barriers to achieving goals:   discharge from facility.     Therapy recommendation(s):    Continue home exercise program. Recommend OP PT for further strengthening, balance and gait training.

## 2017-03-21 ENCOUNTER — HOSPITAL ENCOUNTER (OUTPATIENT)
Facility: CLINIC | Age: 47
Discharge: HOME | End: 2017-03-21
Attending: INTERNAL MEDICINE | Admitting: INTERNAL MEDICINE

## 2017-03-21 ENCOUNTER — SURGERY (OUTPATIENT)
Age: 47
End: 2017-03-21

## 2017-03-21 VITALS
HEIGHT: 75 IN | SYSTOLIC BLOOD PRESSURE: 132 MMHG | TEMPERATURE: 98.2 F | BODY MASS INDEX: 27.88 KG/M2 | DIASTOLIC BLOOD PRESSURE: 90 MMHG | RESPIRATION RATE: 16 BRPM | OXYGEN SATURATION: 98 % | WEIGHT: 224.21 LBS

## 2017-03-21 LAB
BASOPHILS # BLD AUTO: 0 10E9/L (ref 0–0.2)
BASOPHILS NFR BLD AUTO: 0.5 %
DIFFERENTIAL METHOD BLD: ABNORMAL
EOSINOPHIL # BLD AUTO: 0.2 10E9/L (ref 0–0.7)
EOSINOPHIL NFR BLD AUTO: 2.7 %
ERYTHROCYTE [DISTWIDTH] IN BLOOD BY AUTOMATED COUNT: 17.5 % (ref 10–15)
HCT VFR BLD AUTO: 24.8 % (ref 40–53)
HGB BLD-MCNC: 7.9 G/DL (ref 13.3–17.7)
IMM GRANULOCYTES # BLD: 0 10E9/L (ref 0–0.4)
IMM GRANULOCYTES NFR BLD: 0.4 %
INR PPP: 1.29 (ref 0.86–1.14)
LYMPHOCYTES # BLD AUTO: 1.7 10E9/L (ref 0.8–5.3)
LYMPHOCYTES NFR BLD AUTO: 20 %
MCH RBC QN AUTO: 25.6 PG (ref 26.5–33)
MCHC RBC AUTO-ENTMCNC: 31.9 G/DL (ref 31.5–36.5)
MCV RBC AUTO: 81 FL (ref 78–100)
MONOCYTES # BLD AUTO: 0.5 10E9/L (ref 0–1.3)
MONOCYTES NFR BLD AUTO: 6.4 %
NEUTROPHILS # BLD AUTO: 6 10E9/L (ref 1.6–8.3)
NEUTROPHILS NFR BLD AUTO: 70 %
NRBC # BLD AUTO: 0 10*3/UL
NRBC BLD AUTO-RTO: 0 /100
PLATELET # BLD AUTO: 296 10E9/L (ref 150–450)
RBC # BLD AUTO: 3.08 10E12/L (ref 4.4–5.9)
WBC # BLD AUTO: 8.5 10E9/L (ref 4–11)

## 2017-03-21 PROCEDURE — 85610 PROTHROMBIN TIME: CPT | Performed by: PHYSICIAN ASSISTANT

## 2017-03-21 PROCEDURE — 40000141 ZZH STATISTIC PERIPHERAL IV START W/O US GUIDANCE

## 2017-03-21 PROCEDURE — 40000882 ZZH CANCELLED SURGERY UP TO 46-60 MINS: Performed by: INTERNAL MEDICINE

## 2017-03-21 PROCEDURE — 85025 COMPLETE CBC W/AUTO DIFF WBC: CPT | Performed by: PHYSICIAN ASSISTANT

## 2017-03-21 RX ORDER — LIDOCAINE 40 MG/G
CREAM TOPICAL
Status: DISCONTINUED | OUTPATIENT
Start: 2017-03-21 | End: 2017-03-21 | Stop reason: HOSPADM

## 2017-03-21 RX ORDER — SODIUM CHLORIDE, SODIUM LACTATE, POTASSIUM CHLORIDE, CALCIUM CHLORIDE 600; 310; 30; 20 MG/100ML; MG/100ML; MG/100ML; MG/100ML
INJECTION, SOLUTION INTRAVENOUS CONTINUOUS
Status: DISCONTINUED | OUTPATIENT
Start: 2017-03-21 | End: 2017-03-21 | Stop reason: HOSPADM

## 2017-03-22 ENCOUNTER — TELEPHONE (OUTPATIENT)
Dept: OTHER | Facility: CLINIC | Age: 47
End: 2017-03-22

## 2017-03-22 ENCOUNTER — TELEPHONE (OUTPATIENT)
Dept: GASTROENTEROLOGY | Facility: CLINIC | Age: 47
End: 2017-03-22

## 2017-03-22 DIAGNOSIS — K85.90 ACUTE PANCREATITIS, UNSPECIFIED COMPLICATION STATUS, UNSPECIFIED PANCREATITIS TYPE: ICD-10-CM

## 2017-03-22 DIAGNOSIS — K85.91 NECROTIZING PANCREATITIS: Primary | ICD-10-CM

## 2017-03-22 RX ORDER — FLUCONAZOLE 100 MG/1
200 TABLET ORAL DAILY
Qty: 14 TABLET | Refills: 0 | Status: CANCELLED | OUTPATIENT
Start: 2017-03-22 | End: 2017-03-29

## 2017-03-22 RX ORDER — LINEZOLID 600 MG/1
600 TABLET, FILM COATED ORAL EVERY 12 HOURS
Qty: 14 TABLET | Refills: 0 | Status: CANCELLED | OUTPATIENT
Start: 2017-03-22

## 2017-03-22 RX ORDER — METRONIDAZOLE 500 MG/1
500 TABLET ORAL 3 TIMES DAILY
Qty: 21 TABLET | Refills: 0 | Status: CANCELLED | OUTPATIENT
Start: 2017-03-22

## 2017-03-22 RX ORDER — CIPROFLOXACIN 500 MG/1
500 TABLET, FILM COATED ORAL 2 TIMES DAILY
Qty: 14 TABLET | Refills: 0 | Status: CANCELLED | OUTPATIENT
Start: 2017-03-22

## 2017-03-23 ENCOUNTER — HOSPITAL ENCOUNTER (OUTPATIENT)
Facility: CLINIC | Age: 47
Discharge: HOME | End: 2017-03-23
Attending: INTERNAL MEDICINE | Admitting: INTERNAL MEDICINE

## 2017-03-23 ENCOUNTER — ANESTHESIA EVENT (OUTPATIENT)
Dept: SURGERY | Facility: CLINIC | Age: 47
End: 2017-03-23

## 2017-03-23 ENCOUNTER — SURGERY (OUTPATIENT)
Age: 47
End: 2017-03-23

## 2017-03-23 ENCOUNTER — ANESTHESIA (OUTPATIENT)
Dept: SURGERY | Facility: CLINIC | Age: 47
End: 2017-03-23

## 2017-03-23 ENCOUNTER — APPOINTMENT (OUTPATIENT)
Dept: GENERAL RADIOLOGY | Facility: CLINIC | Age: 47
End: 2017-03-23
Attending: INTERNAL MEDICINE

## 2017-03-23 VITALS
RESPIRATION RATE: 16 BRPM | SYSTOLIC BLOOD PRESSURE: 127 MMHG | WEIGHT: 219.36 LBS | HEIGHT: 75 IN | DIASTOLIC BLOOD PRESSURE: 92 MMHG | BODY MASS INDEX: 27.27 KG/M2 | TEMPERATURE: 98 F | OXYGEN SATURATION: 97 %

## 2017-03-23 DIAGNOSIS — K85.90 ACUTE PANCREATITIS, UNSPECIFIED COMPLICATION STATUS, UNSPECIFIED PANCREATITIS TYPE: Primary | ICD-10-CM

## 2017-03-23 LAB
ALBUMIN SERPL-MCNC: 2.6 G/DL (ref 3.4–5)
ALP SERPL-CCNC: 82 U/L (ref 40–150)
ALT SERPL W P-5'-P-CCNC: 11 U/L (ref 0–70)
AMYLASE SERPL-CCNC: 87 U/L (ref 30–110)
ANION GAP SERPL CALCULATED.3IONS-SCNC: 9 MMOL/L (ref 3–14)
AST SERPL W P-5'-P-CCNC: 11 U/L (ref 0–45)
BILIRUB SERPL-MCNC: 0.3 MG/DL (ref 0.2–1.3)
BUN SERPL-MCNC: 6 MG/DL (ref 7–30)
CALCIUM SERPL-MCNC: 8.7 MG/DL (ref 8.5–10.1)
CHLORIDE SERPL-SCNC: 100 MMOL/L (ref 94–109)
CO2 SERPL-SCNC: 27 MMOL/L (ref 20–32)
CREAT SERPL-MCNC: 0.81 MG/DL (ref 0.66–1.25)
ERCP: NORMAL
ERYTHROCYTE [DISTWIDTH] IN BLOOD BY AUTOMATED COUNT: 17.5 % (ref 10–15)
GFR SERPL CREATININE-BSD FRML MDRD: ABNORMAL ML/MIN/1.7M2
GLUCOSE SERPL-MCNC: 96 MG/DL (ref 70–99)
HCT VFR BLD AUTO: 25.9 % (ref 40–53)
HGB BLD-MCNC: 8.2 G/DL (ref 13.3–17.7)
INR PPP: 1.33 (ref 0.86–1.14)
LIPASE SERPL-CCNC: 445 U/L (ref 73–393)
MCH RBC QN AUTO: 25.5 PG (ref 26.5–33)
MCHC RBC AUTO-ENTMCNC: 31.7 G/DL (ref 31.5–36.5)
MCV RBC AUTO: 81 FL (ref 78–100)
PLATELET # BLD AUTO: 259 10E9/L (ref 150–450)
POTASSIUM SERPL-SCNC: 3 MMOL/L (ref 3.4–5.3)
PROT SERPL-MCNC: 7.9 G/DL (ref 6.8–8.8)
RBC # BLD AUTO: 3.21 10E12/L (ref 4.4–5.9)
SODIUM SERPL-SCNC: 136 MMOL/L (ref 133–144)
WBC # BLD AUTO: 7.7 10E9/L (ref 4–11)

## 2017-03-23 PROCEDURE — 27210794 ZZH OR GENERAL SUPPLY STERILE: Performed by: INTERNAL MEDICINE

## 2017-03-23 PROCEDURE — C1876 STENT, NON-COA/NON-COV W/DEL: HCPCS | Performed by: INTERNAL MEDICINE

## 2017-03-23 PROCEDURE — 85610 PROTHROMBIN TIME: CPT | Performed by: INTERNAL MEDICINE

## 2017-03-23 PROCEDURE — 25000128 H RX IP 250 OP 636: Performed by: NURSE ANESTHETIST, CERTIFIED REGISTERED

## 2017-03-23 PROCEDURE — 25800025 ZZH RX 258: Performed by: NURSE ANESTHETIST, CERTIFIED REGISTERED

## 2017-03-23 PROCEDURE — 25000125 ZZHC RX 250: Performed by: NURSE ANESTHETIST, CERTIFIED REGISTERED

## 2017-03-23 PROCEDURE — 83690 ASSAY OF LIPASE: CPT | Performed by: INTERNAL MEDICINE

## 2017-03-23 PROCEDURE — C1877 STENT, NON-COAT/COV W/O DEL: HCPCS | Performed by: INTERNAL MEDICINE

## 2017-03-23 PROCEDURE — 36000057 ZZH SURGERY LEVEL 3 1ST 30 MIN - UMMC: Performed by: INTERNAL MEDICINE

## 2017-03-23 PROCEDURE — 40000279 XR SURGERY CARM FLUORO GREATER THAN 5 MIN W STILLS: Mod: TC

## 2017-03-23 PROCEDURE — 71000027 ZZH RECOVERY PHASE 2 EACH 15 MINS: Performed by: INTERNAL MEDICINE

## 2017-03-23 PROCEDURE — 80053 COMPREHEN METABOLIC PANEL: CPT | Performed by: INTERNAL MEDICINE

## 2017-03-23 PROCEDURE — 25000566 ZZH SEVOFLURANE, EA 15 MIN: Performed by: INTERNAL MEDICINE

## 2017-03-23 PROCEDURE — C1725 CATH, TRANSLUMIN NON-LASER: HCPCS | Performed by: INTERNAL MEDICINE

## 2017-03-23 PROCEDURE — 25500064 ZZH RX 255 OP 636: Performed by: INTERNAL MEDICINE

## 2017-03-23 PROCEDURE — 85027 COMPLETE CBC AUTOMATED: CPT | Performed by: INTERNAL MEDICINE

## 2017-03-23 PROCEDURE — 71000014 ZZH RECOVERY PHASE 1 LEVEL 2 FIRST HR: Performed by: INTERNAL MEDICINE

## 2017-03-23 PROCEDURE — 36000059 ZZH SURGERY LEVEL 3 EA 15 ADDTL MIN UMMC: Performed by: INTERNAL MEDICINE

## 2017-03-23 PROCEDURE — 37000009 ZZH ANESTHESIA TECHNICAL FEE, EACH ADDTL 15 MIN: Performed by: INTERNAL MEDICINE

## 2017-03-23 PROCEDURE — C1769 GUIDE WIRE: HCPCS | Performed by: INTERNAL MEDICINE

## 2017-03-23 PROCEDURE — 36415 COLL VENOUS BLD VENIPUNCTURE: CPT | Performed by: INTERNAL MEDICINE

## 2017-03-23 PROCEDURE — 82150 ASSAY OF AMYLASE: CPT | Performed by: INTERNAL MEDICINE

## 2017-03-23 PROCEDURE — 37000008 ZZH ANESTHESIA TECHNICAL FEE, 1ST 30 MIN: Performed by: INTERNAL MEDICINE

## 2017-03-23 PROCEDURE — 40000170 ZZH STATISTIC PRE-PROCEDURE ASSESSMENT II: Performed by: INTERNAL MEDICINE

## 2017-03-23 DEVICE — STENT SOLUS BILIARY 10FRX03CM DBL PIGTAIL W/INTRO G25670: Type: IMPLANTABLE DEVICE | Site: STOMACH | Status: FUNCTIONAL

## 2017-03-23 RX ORDER — FENTANYL CITRATE 50 UG/ML
INJECTION, SOLUTION INTRAMUSCULAR; INTRAVENOUS PRN
Status: DISCONTINUED | OUTPATIENT
Start: 2017-03-23 | End: 2017-03-23

## 2017-03-23 RX ORDER — NEOSTIGMINE METHYLSULFATE 1 MG/ML
VIAL (ML) INJECTION PRN
Status: DISCONTINUED | OUTPATIENT
Start: 2017-03-23 | End: 2017-03-23

## 2017-03-23 RX ORDER — ONDANSETRON 4 MG/1
4 TABLET, ORALLY DISINTEGRATING ORAL EVERY 30 MIN PRN
Status: DISCONTINUED | OUTPATIENT
Start: 2017-03-23 | End: 2017-03-23 | Stop reason: HOSPADM

## 2017-03-23 RX ORDER — SODIUM CHLORIDE, SODIUM LACTATE, POTASSIUM CHLORIDE, CALCIUM CHLORIDE 600; 310; 30; 20 MG/100ML; MG/100ML; MG/100ML; MG/100ML
INJECTION, SOLUTION INTRAVENOUS CONTINUOUS
Status: DISCONTINUED | OUTPATIENT
Start: 2017-03-23 | End: 2017-03-23 | Stop reason: HOSPADM

## 2017-03-23 RX ORDER — NALOXONE HYDROCHLORIDE 0.4 MG/ML
.1-.4 INJECTION, SOLUTION INTRAMUSCULAR; INTRAVENOUS; SUBCUTANEOUS
Status: DISCONTINUED | OUTPATIENT
Start: 2017-03-23 | End: 2017-03-23 | Stop reason: HOSPADM

## 2017-03-23 RX ORDER — ONDANSETRON 2 MG/ML
4 INJECTION INTRAMUSCULAR; INTRAVENOUS EVERY 30 MIN PRN
Status: DISCONTINUED | OUTPATIENT
Start: 2017-03-23 | End: 2017-03-23 | Stop reason: HOSPADM

## 2017-03-23 RX ORDER — PROPOFOL 10 MG/ML
INJECTION, EMULSION INTRAVENOUS PRN
Status: DISCONTINUED | OUTPATIENT
Start: 2017-03-23 | End: 2017-03-23

## 2017-03-23 RX ORDER — SODIUM CHLORIDE, SODIUM LACTATE, POTASSIUM CHLORIDE, CALCIUM CHLORIDE 600; 310; 30; 20 MG/100ML; MG/100ML; MG/100ML; MG/100ML
INJECTION, SOLUTION INTRAVENOUS CONTINUOUS PRN
Status: DISCONTINUED | OUTPATIENT
Start: 2017-03-23 | End: 2017-03-23

## 2017-03-23 RX ORDER — INDOMETHACIN 50 MG/1
100 SUPPOSITORY RECTAL
Status: DISCONTINUED | OUTPATIENT
Start: 2017-03-23 | End: 2017-03-23 | Stop reason: HOSPADM

## 2017-03-23 RX ORDER — LIDOCAINE 40 MG/G
CREAM TOPICAL
Status: DISCONTINUED | OUTPATIENT
Start: 2017-03-23 | End: 2017-03-23 | Stop reason: HOSPADM

## 2017-03-23 RX ORDER — ONDANSETRON 2 MG/ML
INJECTION INTRAMUSCULAR; INTRAVENOUS PRN
Status: DISCONTINUED | OUTPATIENT
Start: 2017-03-23 | End: 2017-03-23

## 2017-03-23 RX ORDER — IOPAMIDOL 510 MG/ML
INJECTION, SOLUTION INTRAVASCULAR PRN
Status: DISCONTINUED | OUTPATIENT
Start: 2017-03-23 | End: 2017-03-23 | Stop reason: HOSPADM

## 2017-03-23 RX ORDER — CIPROFLOXACIN 500 MG/1
500 TABLET, FILM COATED ORAL 2 TIMES DAILY
Qty: 8 TABLET | Refills: 0 | Status: SHIPPED | OUTPATIENT
Start: 2017-03-23 | End: 2017-03-27

## 2017-03-23 RX ORDER — GLYCOPYRROLATE 0.2 MG/ML
INJECTION, SOLUTION INTRAMUSCULAR; INTRAVENOUS PRN
Status: DISCONTINUED | OUTPATIENT
Start: 2017-03-23 | End: 2017-03-23

## 2017-03-23 RX ORDER — LIDOCAINE HYDROCHLORIDE 20 MG/ML
INJECTION, SOLUTION INFILTRATION; PERINEURAL PRN
Status: DISCONTINUED | OUTPATIENT
Start: 2017-03-23 | End: 2017-03-23

## 2017-03-23 RX ORDER — FLUMAZENIL 0.1 MG/ML
0.2 INJECTION, SOLUTION INTRAVENOUS
Status: DISCONTINUED | OUTPATIENT
Start: 2017-03-23 | End: 2017-03-23 | Stop reason: HOSPADM

## 2017-03-23 RX ORDER — EPHEDRINE SULFATE 50 MG/ML
INJECTION, SOLUTION INTRAMUSCULAR; INTRAVENOUS; SUBCUTANEOUS PRN
Status: DISCONTINUED | OUTPATIENT
Start: 2017-03-23 | End: 2017-03-23

## 2017-03-23 RX ORDER — MEPERIDINE HYDROCHLORIDE 25 MG/ML
12.5 INJECTION INTRAMUSCULAR; INTRAVENOUS; SUBCUTANEOUS
Status: DISCONTINUED | OUTPATIENT
Start: 2017-03-23 | End: 2017-03-23 | Stop reason: HOSPADM

## 2017-03-23 RX ADMIN — PROPOFOL 140 MG: 10 INJECTION, EMULSION INTRAVENOUS at 13:35

## 2017-03-23 RX ADMIN — ONDANSETRON 4 MG: 2 INJECTION INTRAMUSCULAR; INTRAVENOUS at 15:23

## 2017-03-23 RX ADMIN — GLYCOPYRROLATE 0.8 MG: 0.2 INJECTION, SOLUTION INTRAMUSCULAR; INTRAVENOUS at 15:28

## 2017-03-23 RX ADMIN — PHENYLEPHRINE HYDROCHLORIDE 100 MCG: 10 INJECTION, SOLUTION INTRAMUSCULAR; INTRAVENOUS; SUBCUTANEOUS at 14:07

## 2017-03-23 RX ADMIN — PHENYLEPHRINE HYDROCHLORIDE 100 MCG: 10 INJECTION, SOLUTION INTRAMUSCULAR; INTRAVENOUS; SUBCUTANEOUS at 13:44

## 2017-03-23 RX ADMIN — PHENYLEPHRINE HYDROCHLORIDE 100 MCG: 10 INJECTION, SOLUTION INTRAMUSCULAR; INTRAVENOUS; SUBCUTANEOUS at 15:16

## 2017-03-23 RX ADMIN — GLUCAGON HYDROCHLORIDE 0.4 MG: 1 INJECTION, POWDER, FOR SOLUTION INTRAMUSCULAR; INTRAVENOUS; SUBCUTANEOUS at 14:28

## 2017-03-23 RX ADMIN — SODIUM CHLORIDE, POTASSIUM CHLORIDE, SODIUM LACTATE AND CALCIUM CHLORIDE: 600; 310; 30; 20 INJECTION, SOLUTION INTRAVENOUS at 13:28

## 2017-03-23 RX ADMIN — MIDAZOLAM HYDROCHLORIDE 2 MG: 1 INJECTION, SOLUTION INTRAMUSCULAR; INTRAVENOUS at 13:24

## 2017-03-23 RX ADMIN — Medication 4 MG: at 15:28

## 2017-03-23 RX ADMIN — Medication 5 MG: at 13:52

## 2017-03-23 RX ADMIN — FENTANYL CITRATE 100 MCG: 50 INJECTION, SOLUTION INTRAMUSCULAR; INTRAVENOUS at 13:31

## 2017-03-23 RX ADMIN — IOPAMIDOL 60 ML: 510 INJECTION, SOLUTION INTRAVASCULAR at 15:30

## 2017-03-23 RX ADMIN — SODIUM CHLORIDE, POTASSIUM CHLORIDE, SODIUM LACTATE AND CALCIUM CHLORIDE: 600; 310; 30; 20 INJECTION, SOLUTION INTRAVENOUS at 14:24

## 2017-03-23 RX ADMIN — PHENYLEPHRINE HYDROCHLORIDE 200 MCG: 10 INJECTION, SOLUTION INTRAMUSCULAR; INTRAVENOUS; SUBCUTANEOUS at 13:51

## 2017-03-23 RX ADMIN — ROCURONIUM BROMIDE 50 MG: 10 INJECTION INTRAVENOUS at 13:35

## 2017-03-23 RX ADMIN — LIDOCAINE HYDROCHLORIDE 100 MG: 20 INJECTION, SOLUTION INFILTRATION; PERINEURAL at 13:35

## 2017-03-23 RX ADMIN — PHENYLEPHRINE HYDROCHLORIDE 100 MCG: 10 INJECTION, SOLUTION INTRAMUSCULAR; INTRAVENOUS; SUBCUTANEOUS at 14:00

## 2017-03-23 RX ADMIN — PHENYLEPHRINE HYDROCHLORIDE 100 MCG: 10 INJECTION, SOLUTION INTRAMUSCULAR; INTRAVENOUS; SUBCUTANEOUS at 15:05

## 2017-03-23 RX ADMIN — PHENYLEPHRINE HYDROCHLORIDE 100 MCG: 10 INJECTION, SOLUTION INTRAMUSCULAR; INTRAVENOUS; SUBCUTANEOUS at 13:40

## 2017-03-23 RX ADMIN — FENTANYL CITRATE 50 MCG: 50 INJECTION, SOLUTION INTRAMUSCULAR; INTRAVENOUS at 15:24

## 2017-03-23 NOTE — ANESTHESIA POSTPROCEDURE EVALUATION
Patient: Jayson Bella    Procedure(s):  Endoscopic ultrasound, esophagoscopy, gastroscopy, duodenoscopy and necrosectomy with stent exchange - Wound Class: II-Clean Contaminated    Diagnosis:Bile Duct Stricture   Diagnosis Additional Information: No value filed.    Anesthesia Type:  General    Note:  Anesthesia Post Evaluation    Patient location during evaluation: PACU  Patient participation: Able to fully participate in evaluation  Level of consciousness: awake and alert  Pain management: adequate  Airway patency: patent  Cardiovascular status: acceptable  Respiratory status: acceptable  Hydration status: acceptable  PONV: controlled     Anesthetic complications: None          Last vitals:  Vitals:    03/23/17 0836 03/23/17 1545   BP: (!) 139/97 130/89   Resp: 16 15   Temp: 36.8  C (98.2  F) 36.8  C (98.3  F)   SpO2: 97% 99%         Electronically Signed By: Valerie Pitts MD  March 23, 2017  3:56 PM

## 2017-03-23 NOTE — BRIEF OP NOTE
General acute hospital, [place]    Brief Operative Note    Pre-operative diagnosis: Walled off necrosis  Post-operative diagnosis * No post-op diagnosis entered *  Procedure: Procedure(s):  Endoscopic ultrasound, esophagoscopy, gastroscopy, duodenoscopy and necrosectomy with stent exchange - Wound Class: II-Clean Contaminated  Surgeon: Surgeon(s) and Role:     * Andria Puente MD     * Pawel Lamb MD - Primary     * Guru Robert Quevedo MD  Anesthesia: * No anesthesia type entered *   Estimated blood loss: Minimal  Drains: None  Specimens: * No specimens in log *  Findings:   Previously placed DPT solus stents removed with a snare. Cannulated the cystgastrostomy tract, dilated to 15 mm. Necrosectomy performed with balloon cathter. Small amount of necrosis removed. Two DPT 10F X 3 CM solus stents placed.     Recommendation: continue antibiotics for 7 more days. Get a CT scan in 1-2 weeks and repeat necrosectomy.     Complications: None.  Implants: None.

## 2017-03-23 NOTE — ANESTHESIA PREPROCEDURE EVALUATION
Anesthesia Evaluation     .             ROS/MED HX    ENT/Pulmonary:  - neg pulmonary ROS     Neurologic:  - neg neurologic ROS     Cardiovascular:  - neg cardiovascular ROS       METS/Exercise Tolerance:     Hematologic:  - neg hematologic  ROS       Musculoskeletal:  - neg musculoskeletal ROS       GI/Hepatic:  - neg GI/hepatic ROS   (+) GERD       Renal/Genitourinary:  - ROS Renal section negative       Endo:  - neg endo ROS       Psychiatric:         Infectious Disease:         Malignancy:         Other:                     Physical Exam  Normal systems: cardiovascular, pulmonary and dental    Airway   Mallampati: II  TM distance: >3 FB  Neck ROM: full    Dental     Cardiovascular       Pulmonary                     Anesthesia Plan      History & Physical Review      ASA Status:  2 .    NPO Status:  > 8 hours    Plan for General with Intravenous induction. Maintenance will be Balanced.    PONV prophylaxis:  Ondansetron       Postoperative Care  Postoperative pain management:  IV analgesics.      Consents  Anesthetic plan, risks, benefits and alternatives discussed with:  Patient..                          .

## 2017-03-23 NOTE — ANESTHESIA CARE TRANSFER NOTE
Patient: Jayson Bella    Procedure(s):  Endoscopic ultrasound, esophagoscopy, gastroscopy, duodenoscopy and necrosectomy with stent exchange - Wound Class: II-Clean Contaminated    Diagnosis: Bile Duct Stricture   Diagnosis Additional Information: No value filed.    Anesthesia Type:   General     Note:  Airway :Face Mask  Patient transferred to:PACU  Comments: Pt transferred to PACU.  Maintaining airway and oxygenation on simple face mask.  VSS.  Report to RN.  All questions answered.       Vitals: (Last set prior to Anesthesia Care Transfer)    CRNA VITALS  3/23/2017 1506 - 3/23/2017 1550      3/23/2017             Pulse: 92    SpO2: 97 %    Resp Rate (observed): (!)  2    Resp Rate (set): 10                Electronically Signed By: NANDINI Sauceda CRNA  March 23, 2017  3:50 PM

## 2017-03-23 NOTE — IP AVS SNAPSHOT
Same Day Surgery [address] [address]    Phone:  [phone]                                       After Visit Summary   3/23/2017    Jayson Bella    MRN: [id number]           After Visit Summary Signature Page     I have received my discharge instructions, and my questions have been answered. I have discussed any challenges I see with this plan with the nurse or doctor.    ..........................................................................................................................................  Patient/Patient Representative Signature      ..........................................................................................................................................  Patient Representative Print Name and Relationship to Patient    ..................................................               ................................................  Date                                            Time    ..........................................................................................................................................  Reviewed by Signature/Title    ...................................................              ..............................................  Date                                                            Time

## 2017-03-23 NOTE — DISCHARGE INSTRUCTIONS
[facility]  Same-Day Surgery   Adult Discharge Orders & Instructions     For 24 hours after surgery    1. Get plenty of rest.  A responsible adult must stay with you for at least 24 hours after you leave the hospital.   2. Do not drive or use heavy equipment.  If you have weakness or tingling, don't drive or use heavy equipment until this feeling goes away.  3. Do not drink alcohol.  4. Avoid strenuous or risky activities.  Ask for help when climbing stairs.   5. You may feel lightheaded.  IF so, sit for a few minutes before standing.  Have someone help you get up.   6. If you have nausea (feel sick to your stomach): Drink only clear liquids such as apple juice, ginger ale, broth or 7-Up.  Rest may also help.  Be sure to drink enough fluids.  Move to a regular diet as you feel able.  7. You may have a slight fever. Call the doctor if your fever is over 100 F (37.7 C) (taken under the tongue) or lasts longer than 24 hours.  8. You may have a dry mouth, a sore throat, muscle aches or trouble sleeping.  These should go away after 24 hours.  9. Do not make important or legal decisions.   Call your doctor for any of the followin.  Signs of infection (fever, growing tenderness at the surgery site, a large amount of drainage or bleeding, severe pain, foul-smelling drainage, redness, swelling).    2. It has been over 8 to 10 hours since surgery and you are still not able to urinate (pass water).    3.  Headache for over 24 hours.    To contact a doctor, call Dr Lamb at [phone] or:        [phone] and ask for the resident on call for   GI/surgery resident (answered 24 hours a day)      Emergency Department:    [facility]: [phone]       (TTY for hearing impaired: [phone])    [facility]: [phone]       (TTY for hearing impaired: [phone])

## 2017-03-23 NOTE — IP AVS SNAPSHOT
MRN:[id number]                      After Visit Summary   3/23/2017    Jayson Bella    MRN: [id number]           Thank you!     Thank you for choosing [facility] for your care. Our goal is always to provide you with excellent care. Hearing back from our patients is one way we can continue to improve our services. Please take a few minutes to complete the written survey that you may receive in the mail after you visit with us. Thank you!        Patient Information     Date Of Birth          1970        About your hospital stay     You were admitted on:  March 23, 2017 You last received care in the:  Same Day Surgery [facility]    You were discharged on:  March 23, 2017       Who to Call     For medical emergencies, please call 911.  For non-urgent questions about your medical care, please call your primary care provider or clinic, [phone]  For questions related to your surgery, please call your surgery clinic        Attending Provider     Provider Serafin Antunez MD Gastroenterology       Primary Care Provider Office Phone # Fax #    Xgexhj Tay [phone]       [facility] [address] [facility] [address]        After Care Instructions     Discharge Instructions       No driving or operating machinery until the day after procedure.            Discharge Instructions       Recommend that a responsible adult remain with the patient at home for 6 hours post discharge.            Discharge Instructions       Start with clear liquids, sips of water 1 hour after procedure. If no abdominal pain and gag reflex has returned, advance as tolerated to pre-procedure diet.              Discharge Instructions       Restart home medications.            Discharge Instructions       Check with MD when to start anticoagulants.            Discharge Instructions       No ALCOHOL 24 hours post procedure.                  Further instructions from your care team        [facility]  [facility]   Adult Discharge Orders & Instructions     For 24 hours after surgery    1. Get plenty of rest.  A responsible adult must stay with you for at least 24 hours after you leave the hospital.   2. Do not drive or use heavy equipment.  If you have weakness or tingling, don't drive or use heavy equipment until this feeling goes away.  3. Do not drink alcohol.  4. Avoid strenuous or risky activities.  Ask for help when climbing stairs.   5. You may feel lightheaded.  IF so, sit for a few minutes before standing.  Have someone help you get up.   6. If you have nausea (feel sick to your stomach): Drink only clear liquids such as apple juice, ginger ale, broth or 7-Up.  Rest may also help.  Be sure to drink enough fluids.  Move to a regular diet as you feel able.  7. You may have a slight fever. Call the doctor if your fever is over 100 F (37.7 C) (taken under the tongue) or lasts longer than 24 hours.  8. You may have a dry mouth, a sore throat, muscle aches or trouble sleeping.  These should go away after 24 hours.  9. Do not make important or legal decisions.   Call your doctor for any of the followin.  Signs of infection (fever, growing tenderness at the surgery site, a large amount of drainage or bleeding, severe pain, foul-smelling drainage, redness, swelling).    2. It has been over 8 to 10 hours since surgery and you are still not able to urinate (pass water).    3.  Headache for over 24 hours.    To contact a doctor, call Dr Lamb at [phone] or:        [phone] and ask for the resident on call for   GI/surgery resident (answered 24 hours a day)      Emergency Department:    [facility]: [phone]       (TTY for hearing impaired: [phone])    [facility]: [phone]       (TTY for hearing impaired: [phone])          Pending Results     No orders found from 3/21/2017 to 3/24/2017.            Admission  "Information     Date & Time Provider Department Dept. Phone    3/23/2017 Serafin Casanova MD Same Day Surgery [facility] [phone]      Your Vitals Were     Blood Pressure Temperature Respirations Height Weight Pulse Oximetry    133/93 98.3  F (36.8  C) (Oral) 15 1.905 m (6' 3\") 99.5 kg (219 lb 5.7 oz) 98%    BMI (Body Mass Index)                   27.42 kg/m2           [facility] Information     [facility] lets you send messages to your doctor, view your test results, renew your prescriptions, schedule appointments and more. To sign up, go to [email or web address] . Click on \"Log in\" on the left side of the screen, which will take you to the Welcome page. Then click on \"Sign up Now\" on the right side of the page.     You will be asked to enter the access code listed below, as well as some personal information. Please follow the directions to create your username and password.     Your access code is: [email or web address]  Expires: 2017  3:22 PM     Your access code will  in 90 days. If you need help or a new code, please call your [facility] clinic or [phone].        Care EveryWhere ID     This is your Care EveryWhere ID. This could be used by other organizations to access your [facility] medical records  [id number]           Review of your medicines      CONTINUE these medicines which may have CHANGED, or have new prescriptions. If we are uncertain of the size of tablets/capsules you have at home, strength may be listed as something that might have changed.        Dose / Directions    * ciprofloxacin 500 MG tablet   Commonly known as:  CIPRO   Indication:  Infection Within the Abdomen   This may have changed:  Another medication with the same name was added. Make sure you understand how and when to take each.   Used for:  Acute pancreatitis, unspecified complication status, unspecified pancreatitis type        Dose:  500 mg   Take 1 tablet (500 mg) by mouth 2 times daily   Quantity:  14 tablet "   Refills:  0       * ciprofloxacin 500 MG tablet   Commonly known as:  CIPRO   This may have changed:  You were already taking a medication with the same name, and this prescription was added. Make sure you understand how and when to take each.   Used for:  Acute pancreatitis, unspecified complication status, unspecified pancreatitis type        Dose:  500 mg   Take 1 tablet (500 mg) by mouth 2 times daily for 4 days   Quantity:  8 tablet   Refills:  0       * Notice:  This list has 2 medication(s) that are the same as other medications prescribed for you. Read the directions carefully, and ask your doctor or other care provider to review them with you.      CONTINUE these medicines which have NOT CHANGED        Dose / Directions    acetaminophen 500 MG tablet   Commonly known as:  TYLENOL   Used for:  Acute pancreatitis, unspecified complication status, unspecified pancreatitis type        Dose:  500-1000 mg   Take 1-2 tablets (500-1,000 mg) by mouth every 6 hours as needed for mild pain or fever   Quantity:  30 tablet   Refills:  1       amylase-lipase-protease 55396 UNITS Cpep per EC capsule   Commonly known as:  CREON 24   Used for:  Acute pancreatitis, unspecified complication status, unspecified pancreatitis type        Dose:  3 capsule   Take 3 capsules (72,000 Units) by mouth 3 times daily (with meals)   Quantity:  180 capsule   Refills:  3       fluconazole 200 MG tablet   Commonly known as:  DIFLUCAN   Indication:  Presence of Candida Fungus in the Blood   Used for:  Acute pancreatitis, unspecified complication status, unspecified pancreatitis type        Dose:  200 mg   Take 1 tablet (200 mg) by mouth daily   Quantity:  7 tablet   Refills:  0       linezolid 600 MG tablet   Commonly known as:  ZYVOX   Indication:  Infection Within the Abdomen   Used for:  Acute pancreatitis, unspecified complication status, unspecified pancreatitis type        Dose:  600 mg   Take 1 tablet (600 mg) by mouth every 12  hours   Quantity:  14 tablet   Refills:  0       melatonin 3 MG tablet   Used for:  Insomnia due to medical condition        Dose:  3 mg   Take 1 tablet (3 mg) by mouth At Bedtime   Quantity:  30 tablet   Refills:  1       metroNIDAZOLE 500 MG tablet   Commonly known as:  FLAGYL   Indication:  Infection Within the Abdomen   Used for:  Acute pancreatitis, unspecified complication status, unspecified pancreatitis type        Dose:  500 mg   Take 1 tablet (500 mg) by mouth 3 times daily   Quantity:  21 tablet   Refills:  0       mirtazapine 7.5 MG Tabs tablet   Commonly known as:  REMERON   Used for:  Acute pancreatitis, unspecified complication status, unspecified pancreatitis type        Dose:  7.5 mg   Take 1 tablet (7.5 mg) by mouth At Bedtime   Quantity:  30 tablet   Refills:  3       multivitamin, therapeutic with minerals Tabs tablet   Used for:  Acute pancreatitis, unspecified complication status, unspecified pancreatitis type        Dose:  1 tablet   Take 1 tablet by mouth daily   Quantity:  30 each   Refills:  3       ondansetron 4 MG tablet   Commonly known as:  ZOFRAN   Used for:  Acute pancreatitis, unspecified complication status, unspecified pancreatitis type        Dose:  4 mg   Take 1 tablet (4 mg) by mouth every 6 hours as needed for nausea or vomiting   Quantity:  20 tablet   Refills:  1       pantoprazole 40 MG EC tablet   Commonly known as:  PROTONIX   Used for:  Acute pancreatitis, unspecified complication status, unspecified pancreatitis type        Dose:  40 mg   Take 1 tablet (40 mg) by mouth 2 times daily (before meals)   Quantity:  30 tablet   Refills:  1            Where to get your medicines      Some of these will need a paper prescription and others can be bought over the counter. Ask your nurse if you have questions.     Bring a paper prescription for each of these medications     ciprofloxacin 500 MG tablet                Protect others around you: Learn how to safely use, store and  throw away your medicines at www.disposemymeds.org.             Medication List: This is a list of all your medications and when to take them. Check marks below indicate your daily home schedule. Keep this list as a reference.      Medications           Morning Afternoon Evening Bedtime As Needed    acetaminophen 500 MG tablet   Commonly known as:  TYLENOL   Take 1-2 tablets (500-1,000 mg) by mouth every 6 hours as needed for mild pain or fever                                amylase-lipase-protease 97765 UNITS Cpep per EC capsule   Commonly known as:  CREON 24   Take 3 capsules (72,000 Units) by mouth 3 times daily (with meals)                                * ciprofloxacin 500 MG tablet   Commonly known as:  CIPRO   Take 1 tablet (500 mg) by mouth 2 times daily                                * ciprofloxacin 500 MG tablet   Commonly known as:  CIPRO   Take 1 tablet (500 mg) by mouth 2 times daily for 4 days                                fluconazole 200 MG tablet   Commonly known as:  DIFLUCAN   Take 1 tablet (200 mg) by mouth daily                                linezolid 600 MG tablet   Commonly known as:  ZYVOX   Take 1 tablet (600 mg) by mouth every 12 hours                                melatonin 3 MG tablet   Take 1 tablet (3 mg) by mouth At Bedtime                                metroNIDAZOLE 500 MG tablet   Commonly known as:  FLAGYL   Take 1 tablet (500 mg) by mouth 3 times daily                                mirtazapine 7.5 MG Tabs tablet   Commonly known as:  REMERON   Take 1 tablet (7.5 mg) by mouth At Bedtime                                multivitamin, therapeutic with minerals Tabs tablet   Take 1 tablet by mouth daily                                ondansetron 4 MG tablet   Commonly known as:  ZOFRAN   Take 1 tablet (4 mg) by mouth every 6 hours as needed for nausea or vomiting                                pantoprazole 40 MG EC tablet   Commonly known as:  PROTONIX   Take 1 tablet (40 mg) by  mouth 2 times daily (before meals)                                * Notice:  This list has 2 medication(s) that are the same as other medications prescribed for you. Read the directions carefully, and ask your doctor or other care provider to review them with you.

## 2017-03-27 ENCOUNTER — CARE COORDINATION (OUTPATIENT)
Dept: GASTROENTEROLOGY | Facility: CLINIC | Age: 47
End: 2017-03-27

## 2017-03-27 DIAGNOSIS — K85.91 NECROTIZING PANCREATITIS: Primary | ICD-10-CM

## 2017-03-27 NOTE — PROGRESS NOTES
Post ERCP (3/23/2017) with Dr. Quevedo: Follow-up    Post procedure recommendations: To repeat CT abdomen with IV contrast to reassess residual necrosis.The CT findings should be reviewed with Dr Casanova and endoscopic necrosectomy to be scheduled with him based on CT findings.    Patient states he feels good, denies, nausea, vomiting/fevers. States he will get CT scan done within the next 2 weeks.     Orders placed: CT within 2 weeks due by April 6th.     Contact information verified for future questions/concerns.    Mimi VIRAMONTES RN Coordinator  Dr. Lamb, Dr. Casanova & Dr. Chapa  Pancreas~Biliary  [phone]

## 2017-03-29 ENCOUNTER — TELEPHONE (OUTPATIENT)
Dept: INTERVENTIONAL RADIOLOGY/VASCULAR | Facility: CLINIC | Age: 47
End: 2017-03-29

## 2017-03-29 DIAGNOSIS — K85.91 NECROTIZING PANCREATITIS: Primary | ICD-10-CM

## 2017-03-29 NOTE — TELEPHONE ENCOUNTER
"Called wife to follow up on pt as well as getting imaging done locally due to the distance of travel.     She states that the orders should be sent to [facility]  Ph: [phone]   Fax: [phone]  CT Imaging center  [facility] [address] [place] [address]    I've informed her that we will send CT orders as well as medical records so that their Radiologist will know what we need to image.   I will be including the procedure notes of our Splenic ARtery embolization as  Well as the ERCP note.     All other information is listed on the order. I've also informed her that the CTA abd and pelvis will be able to cover imaging f/u needs for Interventional Radiology and Dr. Casanova's team. She verbalized understanding.     She is to call me with the imaging date when they have this scheduled, so that I can let Mimi know as well    [facility] should be able to push images to us. Will check on this.     *Called [facility], orders will be faxed to them and they will call pt to arrange for appt time.     *I also did inquire on how pt is doing with his skin issues due to long flouro time. She states that they've been informed of this information and that they have not noticed any \"sunburns or skin issues.\"Also pt has been feeling well, denies N/V, fevers.     Kelly Allen RN, BSN  Interventional Radiology Nurse Coordinator   Phone: [phone]    "

## 2017-03-30 LAB
FUNGUS SPEC CULT: NORMAL
MICRO REPORT STATUS: NORMAL
SPECIMEN SOURCE: NORMAL

## 2017-03-31 ENCOUNTER — TRANSFERRED RECORDS (OUTPATIENT)
Dept: HEALTH INFORMATION MANAGEMENT | Facility: CLINIC | Age: 47
End: 2017-03-31

## 2017-04-04 ENCOUNTER — TELEPHONE (OUTPATIENT)
Dept: INTERVENTIONAL RADIOLOGY/VASCULAR | Facility: CLINIC | Age: 47
End: 2017-04-04

## 2017-04-04 NOTE — TELEPHONE ENCOUNTER
Called [facility].   CTA completed last Friday 3/31   Images pushed over. Currently is avail in PACS    Requesting for final report to be faxed to me.     Kelly Allen RN, BSN  Interventional Radiology Nurse Coordinator   Phone: [phone]

## 2017-04-11 ENCOUNTER — TELEPHONE (OUTPATIENT)
Dept: INTERVENTIONAL RADIOLOGY/VASCULAR | Facility: CLINIC | Age: 47
End: 2017-04-11

## 2017-04-11 NOTE — TELEPHONE ENCOUNTER
"Called and spoke to wife in regards to the CTA. Informed her that Dr Dasilva did review the images and that all looks good and the \"aneurysm is gone\".   She is pleased to hear this.     I've informed her that at this time, there's no need to f/u with IR. They will then f/u with Dr Wright' team now.     I've informed her that I'll send a msg to [facility] regarding this. Wife state that they were told by her that they needed to come back to see him for another necrosectomy and they've not heard anything since.     I informed her that I\"ll send a msg for her to call them back in regards to End[facility]'s future plan of care. She agrees to plan.     Kelly Allen RN, BSN  Interventional Radiology Nurse Coordinator   Phone: [phone]      "

## 2017-04-12 ENCOUNTER — CARE COORDINATION (OUTPATIENT)
Dept: GASTROENTEROLOGY | Facility: CLINIC | Age: 47
End: 2017-04-12

## 2017-04-12 ENCOUNTER — TELEPHONE (OUTPATIENT)
Dept: GASTROENTEROLOGY | Facility: CLINIC | Age: 47
End: 2017-04-12

## 2017-04-12 DIAGNOSIS — K85.91 NECROTIZING PANCREATITIS: Primary | ICD-10-CM

## 2017-04-12 NOTE — PROGRESS NOTES
Dr Casanova reviewed CTA , recommendations for Repeat necrosectomy end of April/Early May     Order placed for repeat necrosectomy and sent to scheduling.   Message left for Jayson for plan and to expect call for date and time.     Mimi VIRAMONTES, RN Coordinator  Dr. Lamb, Dr. Casanova & Dr. Chapa  Pancreas~Biliary  [phone] #[phone]

## 2017-04-12 NOTE — TELEPHONE ENCOUNTER
Spoke to Jayson's wife Bri.  She is informed that he is scheduled on 5/15/2017 at 2:30 PM with an arrival time of 12:30 PM.  She knows he will need an updated/valid pre-op physical; explained in details why this is needed.  She will be driving him and monitoring him post.  All instructions will be sent to address listed in EPIC, it was verified during this call.    SR 04/12/2017  306p

## 2017-04-26 ENCOUNTER — TRANSFERRED RECORDS (OUTPATIENT)
Dept: HEALTH INFORMATION MANAGEMENT | Facility: CLINIC | Age: 47
End: 2017-04-26

## 2017-05-15 ENCOUNTER — APPOINTMENT (OUTPATIENT)
Dept: GENERAL RADIOLOGY | Facility: CLINIC | Age: 47
End: 2017-05-15
Attending: INTERNAL MEDICINE

## 2017-05-15 ENCOUNTER — ANESTHESIA EVENT (OUTPATIENT)
Dept: SURGERY | Facility: CLINIC | Age: 47
End: 2017-05-15

## 2017-05-15 ENCOUNTER — HOSPITAL ENCOUNTER (OUTPATIENT)
Facility: CLINIC | Age: 47
Discharge: HOME | End: 2017-05-15
Attending: INTERNAL MEDICINE | Admitting: INTERNAL MEDICINE

## 2017-05-15 ENCOUNTER — ANESTHESIA (OUTPATIENT)
Dept: SURGERY | Facility: CLINIC | Age: 47
End: 2017-05-15

## 2017-05-15 ENCOUNTER — SURGERY (OUTPATIENT)
Age: 47
End: 2017-05-15

## 2017-05-15 VITALS
WEIGHT: 216.27 LBS | OXYGEN SATURATION: 95 % | SYSTOLIC BLOOD PRESSURE: 116 MMHG | HEART RATE: 59 BPM | HEIGHT: 75 IN | BODY MASS INDEX: 26.89 KG/M2 | RESPIRATION RATE: 16 BRPM | DIASTOLIC BLOOD PRESSURE: 89 MMHG | TEMPERATURE: 97.3 F

## 2017-05-15 LAB
APTT PPP: 32 SEC (ref 22–37)
BUN SERPL-MCNC: 7 MG/DL (ref 7–30)
HGB BLD-MCNC: 12 G/DL (ref 13.3–17.7)
INR PPP: 1.12 (ref 0.86–1.14)
PLATELET # BLD AUTO: 250 10E9/L (ref 150–450)
POTASSIUM SERPL-SCNC: 3.4 MMOL/L (ref 3.4–5.3)
UPPER GI ENDOSCOPY: NORMAL

## 2017-05-15 PROCEDURE — 25800025 ZZH RX 258: Performed by: NURSE ANESTHETIST, CERTIFIED REGISTERED

## 2017-05-15 PROCEDURE — 40000171 ZZH STATISTIC PRE-PROCEDURE ASSESSMENT III: Performed by: INTERNAL MEDICINE

## 2017-05-15 PROCEDURE — 36000053 ZZH SURGERY LEVEL 2 EA 15 ADDTL MIN - UMMC: Performed by: INTERNAL MEDICINE

## 2017-05-15 PROCEDURE — 36000051 ZZH SURGERY LEVEL 2 1ST 30 MIN - UMMC: Performed by: INTERNAL MEDICINE

## 2017-05-15 PROCEDURE — 71000014 ZZH RECOVERY PHASE 1 LEVEL 2 FIRST HR: Performed by: INTERNAL MEDICINE

## 2017-05-15 PROCEDURE — 84132 ASSAY OF SERUM POTASSIUM: CPT | Performed by: INTERNAL MEDICINE

## 2017-05-15 PROCEDURE — 25000128 H RX IP 250 OP 636: Performed by: NURSE ANESTHETIST, CERTIFIED REGISTERED

## 2017-05-15 PROCEDURE — 25000125 ZZHC RX 250: Performed by: NURSE ANESTHETIST, CERTIFIED REGISTERED

## 2017-05-15 PROCEDURE — 37000008 ZZH ANESTHESIA TECHNICAL FEE, 1ST 30 MIN: Performed by: INTERNAL MEDICINE

## 2017-05-15 PROCEDURE — 27210995 ZZH RX 272: Performed by: INTERNAL MEDICINE

## 2017-05-15 PROCEDURE — 37000009 ZZH ANESTHESIA TECHNICAL FEE, EACH ADDTL 15 MIN: Performed by: INTERNAL MEDICINE

## 2017-05-15 PROCEDURE — 85610 PROTHROMBIN TIME: CPT | Performed by: INTERNAL MEDICINE

## 2017-05-15 PROCEDURE — 25000125 ZZHC RX 250: Performed by: INTERNAL MEDICINE

## 2017-05-15 PROCEDURE — 85049 AUTOMATED PLATELET COUNT: CPT | Performed by: INTERNAL MEDICINE

## 2017-05-15 PROCEDURE — 85730 THROMBOPLASTIN TIME PARTIAL: CPT | Performed by: INTERNAL MEDICINE

## 2017-05-15 PROCEDURE — C1725 CATH, TRANSLUMIN NON-LASER: HCPCS | Performed by: INTERNAL MEDICINE

## 2017-05-15 PROCEDURE — 27210794 ZZH OR GENERAL SUPPLY STERILE: Performed by: INTERNAL MEDICINE

## 2017-05-15 PROCEDURE — 36415 COLL VENOUS BLD VENIPUNCTURE: CPT | Performed by: INTERNAL MEDICINE

## 2017-05-15 PROCEDURE — 25500064 ZZH RX 255 OP 636

## 2017-05-15 PROCEDURE — 25000565 ZZH ISOFLURANE, EA 15 MIN: Performed by: INTERNAL MEDICINE

## 2017-05-15 PROCEDURE — 84520 ASSAY OF UREA NITROGEN: CPT | Performed by: INTERNAL MEDICINE

## 2017-05-15 PROCEDURE — 71000027 ZZH RECOVERY PHASE 2 EACH 15 MINS: Performed by: INTERNAL MEDICINE

## 2017-05-15 PROCEDURE — 40000277 XR SURGERY CARM FLUORO LESS THAN 5 MIN W STILLS: Mod: TC

## 2017-05-15 PROCEDURE — C1769 GUIDE WIRE: HCPCS | Performed by: INTERNAL MEDICINE

## 2017-05-15 PROCEDURE — 85018 HEMOGLOBIN: CPT | Performed by: INTERNAL MEDICINE

## 2017-05-15 RX ORDER — PROPOFOL 10 MG/ML
INJECTION, EMULSION INTRAVENOUS PRN
Status: DISCONTINUED | OUTPATIENT
Start: 2017-05-15 | End: 2017-05-15

## 2017-05-15 RX ORDER — NALOXONE HYDROCHLORIDE 0.4 MG/ML
.1-.4 INJECTION, SOLUTION INTRAMUSCULAR; INTRAVENOUS; SUBCUTANEOUS
Status: DISCONTINUED | OUTPATIENT
Start: 2017-05-15 | End: 2017-05-15 | Stop reason: HOSPADM

## 2017-05-15 RX ORDER — ONDANSETRON 2 MG/ML
4 INJECTION INTRAMUSCULAR; INTRAVENOUS
Status: DISCONTINUED | OUTPATIENT
Start: 2017-05-15 | End: 2017-05-15 | Stop reason: HOSPADM

## 2017-05-15 RX ORDER — HYDROMORPHONE HYDROCHLORIDE 1 MG/ML
.3-.5 INJECTION, SOLUTION INTRAMUSCULAR; INTRAVENOUS; SUBCUTANEOUS EVERY 10 MIN PRN
Status: DISCONTINUED | OUTPATIENT
Start: 2017-05-15 | End: 2017-05-15

## 2017-05-15 RX ORDER — FENTANYL CITRATE 50 UG/ML
INJECTION, SOLUTION INTRAMUSCULAR; INTRAVENOUS PRN
Status: DISCONTINUED | OUTPATIENT
Start: 2017-05-15 | End: 2017-05-15

## 2017-05-15 RX ORDER — FENTANYL CITRATE 50 UG/ML
25-50 INJECTION, SOLUTION INTRAMUSCULAR; INTRAVENOUS EVERY 5 MIN PRN
Status: DISCONTINUED | OUTPATIENT
Start: 2017-05-15 | End: 2017-05-15

## 2017-05-15 RX ORDER — SODIUM CHLORIDE, SODIUM LACTATE, POTASSIUM CHLORIDE, CALCIUM CHLORIDE 600; 310; 30; 20 MG/100ML; MG/100ML; MG/100ML; MG/100ML
INJECTION, SOLUTION INTRAVENOUS CONTINUOUS
Status: DISCONTINUED | OUTPATIENT
Start: 2017-05-15 | End: 2017-05-15 | Stop reason: HOSPADM

## 2017-05-15 RX ORDER — HYDROMORPHONE HYDROCHLORIDE 1 MG/ML
.3-.5 INJECTION, SOLUTION INTRAMUSCULAR; INTRAVENOUS; SUBCUTANEOUS EVERY 10 MIN PRN
Status: DISCONTINUED | OUTPATIENT
Start: 2017-05-15 | End: 2017-05-15 | Stop reason: HOSPADM

## 2017-05-15 RX ORDER — SODIUM CHLORIDE, SODIUM LACTATE, POTASSIUM CHLORIDE, CALCIUM CHLORIDE 600; 310; 30; 20 MG/100ML; MG/100ML; MG/100ML; MG/100ML
INJECTION, SOLUTION INTRAVENOUS CONTINUOUS PRN
Status: DISCONTINUED | OUTPATIENT
Start: 2017-05-15 | End: 2017-05-15

## 2017-05-15 RX ORDER — ONDANSETRON 4 MG/1
4 TABLET, ORALLY DISINTEGRATING ORAL EVERY 30 MIN PRN
Status: DISCONTINUED | OUTPATIENT
Start: 2017-05-15 | End: 2017-05-15 | Stop reason: HOSPADM

## 2017-05-15 RX ORDER — SODIUM CHLORIDE, SODIUM LACTATE, POTASSIUM CHLORIDE, CALCIUM CHLORIDE 600; 310; 30; 20 MG/100ML; MG/100ML; MG/100ML; MG/100ML
INJECTION, SOLUTION INTRAVENOUS CONTINUOUS
Status: DISCONTINUED | OUTPATIENT
Start: 2017-05-15 | End: 2017-05-15

## 2017-05-15 RX ORDER — LIDOCAINE 40 MG/G
CREAM TOPICAL
Status: DISCONTINUED | OUTPATIENT
Start: 2017-05-15 | End: 2017-05-15 | Stop reason: HOSPADM

## 2017-05-15 RX ORDER — ONDANSETRON 2 MG/ML
4 INJECTION INTRAMUSCULAR; INTRAVENOUS EVERY 30 MIN PRN
Status: DISCONTINUED | OUTPATIENT
Start: 2017-05-15 | End: 2017-05-15

## 2017-05-15 RX ORDER — ONDANSETRON 2 MG/ML
INJECTION INTRAMUSCULAR; INTRAVENOUS PRN
Status: DISCONTINUED | OUTPATIENT
Start: 2017-05-15 | End: 2017-05-15

## 2017-05-15 RX ORDER — FENTANYL CITRATE 50 UG/ML
25-50 INJECTION, SOLUTION INTRAMUSCULAR; INTRAVENOUS
Status: DISCONTINUED | OUTPATIENT
Start: 2017-05-15 | End: 2017-05-15 | Stop reason: HOSPADM

## 2017-05-15 RX ORDER — MEPERIDINE HYDROCHLORIDE 25 MG/ML
12.5 INJECTION INTRAMUSCULAR; INTRAVENOUS; SUBCUTANEOUS
Status: DISCONTINUED | OUTPATIENT
Start: 2017-05-15 | End: 2017-05-15 | Stop reason: HOSPADM

## 2017-05-15 RX ORDER — NEOSTIGMINE METHYLSULFATE 1 MG/ML
VIAL (ML) INJECTION PRN
Status: DISCONTINUED | OUTPATIENT
Start: 2017-05-15 | End: 2017-05-15

## 2017-05-15 RX ORDER — ONDANSETRON 2 MG/ML
4 INJECTION INTRAMUSCULAR; INTRAVENOUS EVERY 30 MIN PRN
Status: DISCONTINUED | OUTPATIENT
Start: 2017-05-15 | End: 2017-05-15 | Stop reason: HOSPADM

## 2017-05-15 RX ORDER — LIDOCAINE HYDROCHLORIDE 20 MG/ML
INJECTION, SOLUTION INFILTRATION; PERINEURAL PRN
Status: DISCONTINUED | OUTPATIENT
Start: 2017-05-15 | End: 2017-05-15

## 2017-05-15 RX ORDER — FLUMAZENIL 0.1 MG/ML
0.2 INJECTION, SOLUTION INTRAVENOUS
Status: DISCONTINUED | OUTPATIENT
Start: 2017-05-15 | End: 2017-05-15 | Stop reason: HOSPADM

## 2017-05-15 RX ORDER — ONDANSETRON 4 MG/1
4 TABLET, ORALLY DISINTEGRATING ORAL EVERY 30 MIN PRN
Status: DISCONTINUED | OUTPATIENT
Start: 2017-05-15 | End: 2017-05-15

## 2017-05-15 RX ORDER — GLYCOPYRROLATE 0.2 MG/ML
INJECTION, SOLUTION INTRAMUSCULAR; INTRAVENOUS PRN
Status: DISCONTINUED | OUTPATIENT
Start: 2017-05-15 | End: 2017-05-15

## 2017-05-15 RX ADMIN — ONDANSETRON 4 MG: 2 INJECTION INTRAMUSCULAR; INTRAVENOUS at 15:39

## 2017-05-15 RX ADMIN — MIDAZOLAM HYDROCHLORIDE 2 MG: 1 INJECTION, SOLUTION INTRAMUSCULAR; INTRAVENOUS at 14:31

## 2017-05-15 RX ADMIN — Medication 4 MG: at 15:39

## 2017-05-15 RX ADMIN — GLYCOPYRROLATE 0.8 MG: 0.2 INJECTION, SOLUTION INTRAMUSCULAR; INTRAVENOUS at 15:39

## 2017-05-15 RX ADMIN — FENTANYL CITRATE 50 MCG: 50 INJECTION, SOLUTION INTRAMUSCULAR; INTRAVENOUS at 15:47

## 2017-05-15 RX ADMIN — WATER 100 ML: 100 IRRIGANT IRRIGATION at 15:20

## 2017-05-15 RX ADMIN — ROCURONIUM BROMIDE 30 MG: 10 INJECTION INTRAVENOUS at 14:42

## 2017-05-15 RX ADMIN — LIDOCAINE HYDROCHLORIDE 100 MG: 20 INJECTION, SOLUTION INFILTRATION; PERINEURAL at 14:42

## 2017-05-15 RX ADMIN — SODIUM CHLORIDE, POTASSIUM CHLORIDE, SODIUM LACTATE AND CALCIUM CHLORIDE: 600; 310; 30; 20 INJECTION, SOLUTION INTRAVENOUS at 14:31

## 2017-05-15 RX ADMIN — PROPOFOL 150 MG: 10 INJECTION, EMULSION INTRAVENOUS at 14:42

## 2017-05-15 RX ADMIN — SIMETHICONE 4 ML: 63.3; 3.7 SOLUTION/ DROPS ORAL at 15:11

## 2017-05-15 RX ADMIN — FENTANYL CITRATE 100 MCG: 50 INJECTION, SOLUTION INTRAMUSCULAR; INTRAVENOUS at 14:31

## 2017-05-15 RX ADMIN — PHENYLEPHRINE HYDROCHLORIDE 100 MCG: 10 INJECTION, SOLUTION INTRAMUSCULAR; INTRAVENOUS; SUBCUTANEOUS at 15:28

## 2017-05-15 ASSESSMENT — LIFESTYLE VARIABLES: TOBACCO_USE: 1

## 2017-05-15 NOTE — BRIEF OP NOTE
[facility], [place]    Brief Operative Note    Pre-operative diagnosis: Necrotizing Pancreatitis   Post-operative diagnosis * No post-op diagnosis entered *  Procedure: Procedure(s):  esophagastroduodenoscopy/EGD with necrosectomy and ballon dilatation of cystgastrostomy tract  - Wound Class: II-Clean Contaminated  Surgeon: Surgeon(s) and Role:     * Serafin Casanova MD - Primary     * Andria Puente MD  Anesthesia: General   Estimated blood loss: Minimal  Drains: None  Specimens: * No specimens in log *  Findings:   Previously placed stents in the cystgastrostomy noted to be in place. Cannulated the cavity and dilated the tract to 15 mm. Moderated amount of necrotic material removed with balloon catheter. Stents remained in place throughout the procedure.     Recommendations: Will need repeat CT scan in 4 weeks. Based on which repeat necrosectomy will be scheduled.   Complications: None.  Implants: None.

## 2017-05-15 NOTE — ANESTHESIA CARE TRANSFER NOTE
Patient: Jayson Bella    Procedure(s):  esophagastroduodenoscopy/EGD with necrosectomy and ballon dilatation of cystgastrostomy tract  - Wound Class: II-Clean Contaminated    Diagnosis: Necrotizing Pancreatitis   Diagnosis Additional Information: No value filed.    Anesthesia Type:   General, ETT     Note:  Airway :Face Mask  Patient transferred to:PACU        Vitals: (Last set prior to Anesthesia Care Transfer)    CRNA VITALS  5/15/2017 1522 - 5/15/2017 1552      5/15/2017             Resp Rate (observed): 16    Resp Rate (set): 10                Electronically Signed By: NANDINI Oconnell CRNA  May 15, 2017  3:52 PM

## 2017-05-15 NOTE — ANESTHESIA POSTPROCEDURE EVALUATION
Patient: Jayson Bella    Procedure(s):  esophagastroduodenoscopy/EGD with necrosectomy and ballon dilatation of cystgastrostomy tract  - Wound Class: II-Clean Contaminated    Diagnosis:Necrotizing Pancreatitis   Diagnosis Additional Information: No value filed.    Anesthesia Type:  General, ETT    Note:  Anesthesia Post Evaluation    Patient location during evaluation: PACU  Patient participation: Able to fully participate in evaluation  Level of consciousness: awake and alert  Pain management: adequate  Airway patency: patent  Cardiovascular status: acceptable  Respiratory status: acceptable  Hydration status: acceptable  PONV: none     Anesthetic complications: None          Last vitals:  Vitals:    05/15/17 1225 05/15/17 1551 05/15/17 1600   BP: (!) 133/98 (!) 141/98 140/88   Pulse: 76     Resp: 16 16    Temp: 36.6  C (97.8  F) 36.6  C (97.8  F)    SpO2: 97% 100% 100%         Electronically Signed By: France Andres MD  May 15, 2017  4:03 PM

## 2017-05-15 NOTE — OR NURSING
Dr Moore notified about pt's bradycardia with HR 45-55, at bedside, assessed pt, ok to transfer pt to phase 2.

## 2017-05-15 NOTE — ANESTHESIA CARE TRANSFER NOTE
Patient: Jayson Bella    Procedure(s):  esophagastroduodenoscopy/EGD with necrosectomy and ballon dilatation of cystgastrostomy tract  - Wound Class: II-Clean Contaminated    Diagnosis: Necrotizing Pancreatitis   Diagnosis Additional Information: No value filed.    Anesthesia Type:   General, ETT     Note:  Airway :Face Mask  Patient transferred to:PACU        Vitals: (Last set prior to Anesthesia Care Transfer)    CRNA VITALS  5/15/2017 1522 - 5/15/2017 1553      5/15/2017             Resp Rate (observed): 16    Resp Rate (set): 10                Electronically Signed By: NANDINI Oconnell CRNA  May 15, 2017  3:53 PM

## 2017-05-15 NOTE — DISCHARGE INSTRUCTIONS
[facility]  Same-Day Surgery   Adult Discharge Orders & Instructions     For 24 hours after surgery    1. Get plenty of rest.  A responsible adult must stay with you for at least 24 hours after you leave the hospital.   2. Do not drive or use heavy equipment.  If you have weakness or tingling, don't drive or use heavy equipment until this feeling goes away.  3. Do not drink alcohol.  4. Avoid strenuous or risky activities.  Ask for help when climbing stairs.   5. You may feel lightheaded.  IF so, sit for a few minutes before standing.  Have someone help you get up.   6. If you have nausea (feel sick to your stomach): Drink only clear liquids such as apple juice, ginger ale, broth or 7-Up.  Rest may also help.  Be sure to drink enough fluids.  Move to a regular diet as you feel able.  7. You may have a slight fever. Call the doctor if your fever is over 100 F (37.7 C) (taken under the tongue) or lasts longer than 24 hours.  8. You may have a dry mouth, a sore throat, muscle aches or trouble sleeping.  These should go away after 24 hours.  9. Do not make important or legal decisions.   Call your doctor for any of the followin.  Signs of infection (fever, growing tenderness at the surgery site, a large amount of drainage or bleeding, severe pain, foul-smelling drainage, redness, swelling).    2. It has been over 8 to 10 hours since surgery and you are still not able to urinate (pass water).    3.  Headache for over 24 hours.      To contact a doctor, call Dr. Casanova's clinic #[phone] or:        [phone] and ask for the resident on call for GI (answered 24 hours a day)      Emergency Department:    [facility]: [phone]       (TTY for hearing impaired: [phone])    [facility]: [phone]       (TTY for hearing impaired: [phone])

## 2017-05-15 NOTE — ANESTHESIA PREPROCEDURE EVALUATION
Anesthesia Evaluation     . Pt has had prior anesthetic. Type: General    No history of anesthetic complications          ROS/MED HX    ENT/Pulmonary:     (+)tobacco use, Past use , . .    Neurologic:  - neg neurologic ROS     Cardiovascular:  - neg cardiovascular ROS   (+) ----. : . . . :. . Previous cardiac testing date:results:date: results:ECG reviewed date:02/23/2017 results:Sinus rhythm. date: results:          METS/Exercise Tolerance:  >4 METS   Hematologic: Comments: Anemia of chronic disease.  Hgb 11.8 on 04/26.      (+) Anemia, -      Musculoskeletal:  - neg musculoskeletal ROS       GI/Hepatic: Comment: Diagnosis of chronic pancreatitis.          Renal/Genitourinary:  - ROS Renal section negative       Endo:  - neg endo ROS       Psychiatric:     (+) psychiatric history depression and anxiety      Infectious Disease:  - neg infectious disease ROS       Malignancy:      - no malignancy   Other:    - neg other ROS                 Physical Exam  Normal systems: pulmonary    Airway   Mallampati: II  TM distance: >3 FB  Neck ROM: full    Dental     Cardiovascular   Rhythm and rate: regular and normal      Pulmonary                        Lab / Radiology Results:   Reviewed current labs when avail, see EMR for details.      BMP:  Recent Labs   Lab Test  03/23/17   0900   NA  136   POTASSIUM  3.0*   CHLORIDE  100   CO2  27   BUN  6*   CR  0.81   GLC  96   YAN  8.7       LFTs:   Recent Labs   Lab Test  03/23/17   0900   PROTTOTAL  7.9   ALBUMIN  2.6*   BILITOTAL  0.3   ALKPHOS  82   AST  11   ALT  11       CBC:   Recent Labs   Lab Test  05/15/17   1310  03/23/17   0900   WBC   --   7.7   HGB  12.0*  8.2*   PLT  250  259       Coags:  Recent Labs   Lab Test  03/23/17   0900   INR  1.33*       Blood Bank:  Lab Results   Component Value Date    ABO O 03/07/2017    RH  Pos 03/07/2017    AS Neg 03/07/2017       Studies:  See EMR for current studies, reviewed when available.      Anesthesia Plan      History &  Physical Review  History and physical reviewed and following examination; no interval change.    ASA Status:  3 .    NPO Status:  > 8 hours    Plan for General and ETT with Intravenous induction. Maintenance will be Balanced.    PONV prophylaxis:  Ondansetron (or other 5HT-3) and Dexamethasone or Solumedrol       Postoperative Care  Postoperative pain management:  Multi-modal analgesia.      Consents  Anesthetic plan, risks, benefits and alternatives discussed with:  Patient.  Use of blood products discussed: Yes.   Use of blood products discussed with Patient.  Consented to blood products.  .      León Clements MD  Anesthesiologist  1:43 PM  May 15, 2017                        .

## 2017-05-15 NOTE — IP AVS SNAPSHOT
MRN:[id number]                      After Visit Summary   5/15/2017    Jayson Bella    MRN: [id number]           Thank you!     Thank you for choosing [facility] for your care. Our goal is always to provide you with excellent care. Hearing back from our patients is one way we can continue to improve our services. Please take a few minutes to complete the written survey that you may receive in the mail after you visit with us. Thank you!        Patient Information     Date Of Birth          1970        About your hospital stay     You were admitted on:  May 15, 2017 You last received care in the:  Same Day Surgery [facility]    You were discharged on:  May 15, 2017       Who to Call     For medical emergencies, please call 911.  For non-urgent questions about your medical care, please call your primary care provider or clinic, [phone]  For questions related to your surgery, please call your surgery clinic        Attending Provider     Provider Serafin Antunez MD Gastroenterology       Primary Care Provider Office Phone # Fax #    Olqeqh Tay [phone]       [facility] [address] [address]        After Care Instructions     Discharge Instructions       No driving or operating machinery until the day after procedure.            Discharge Instructions       Recommend that a responsible adult remain with the patient at home for 6 hours post discharge.            Discharge Instructions       Start with clear liquids, sips of water 1 hour after procedure. If no abdominal pain and gag reflex has returned, advance as tolerated to pre-procedure diet.              Discharge Instructions       Restart home medications.            Discharge Instructions       Check with MD when to start anticoagulants.            Discharge Instructions       No ALCOHOL 24 hours post procedure.                  Further instructions from your care team        [facility]  Same-Day Surgery   Adult Discharge Orders & Instructions     For 24 hours after surgery    1. Get plenty of rest.  A responsible adult must stay with you for at least 24 hours after you leave the hospital.   2. Do not drive or use heavy equipment.  If you have weakness or tingling, don't drive or use heavy equipment until this feeling goes away.  3. Do not drink alcohol.  4. Avoid strenuous or risky activities.  Ask for help when climbing stairs.   5. You may feel lightheaded.  IF so, sit for a few minutes before standing.  Have someone help you get up.   6. If you have nausea (feel sick to your stomach): Drink only clear liquids such as apple juice, ginger ale, broth or 7-Up.  Rest may also help.  Be sure to drink enough fluids.  Move to a regular diet as you feel able.  7. You may have a slight fever. Call the doctor if your fever is over 100 F (37.7 C) (taken under the tongue) or lasts longer than 24 hours.  8. You may have a dry mouth, a sore throat, muscle aches or trouble sleeping.  These should go away after 24 hours.  9. Do not make important or legal decisions.   Call your doctor for any of the followin.  Signs of infection (fever, growing tenderness at the surgery site, a large amount of drainage or bleeding, severe pain, foul-smelling drainage, redness, swelling).    2. It has been over 8 to 10 hours since surgery and you are still not able to urinate (pass water).    3.  Headache for over 24 hours.      To contact a doctor, call Dr. Casanova's clinic #[phone] or:        [phone] and ask for the resident on call for GI (answered 24 hours a day)      Emergency Department:    [facility]: [phone]       (TTY for hearing impaired: [phone])    [facility]: [phone]       (TTY for hearing impaired: [phone])        Pending Results     No orders found from 2017 to 2017.            Admission Information      "Date & Time Provider Department Dept. Phone    5/15/2017 Serafin Casanova MD Same Day Surgery [facility] [phone]      Your Vitals Were     Blood Pressure Pulse Temperature Respirations Height Weight    141/81 59 96  F (35.6  C) (Axillary) 14 1.905 m (6' 3\") 98.1 kg (216 lb 4.3 oz)    Pulse Oximetry BMI (Body Mass Index)                99% 27.03 kg/m2          [employer] Information     [employer] lets you send messages to your doctor, view your test results, renew your prescriptions, schedule appointments and more. To sign up, go to [email or web address].[facility] [facility] . Click on \"Log in\" on the left side of the screen, which will take you to the Welcome page. Then click on \"Sign up Now\" on the right side of the page.     You will be asked to enter the access code listed below, as well as some personal information. Please follow the directions to create your username and password.     Your access code is: [email or web address]  Expires: 2017  3:22 PM     Your access code will  in 90 days. If you need help or a new code, please call your [place] clinic or [phone].        Care EveryWhere ID     This is your Care EveryWhere ID. This could be used by other organizations to access your [place] medical records  [id number]           Review of your medicines      CONTINUE these medicines which have NOT CHANGED        Dose / Directions    acetaminophen 500 MG tablet   Commonly known as:  TYLENOL   Used for:  Acute pancreatitis, unspecified complication status, unspecified pancreatitis type        Dose:  500-1000 mg   Take 1-2 tablets (500-1,000 mg) by mouth every 6 hours as needed for mild pain or fever   Quantity:  30 tablet   Refills:  1       amylase-lipase-protease 73365 UNITS Cpep per EC capsule   Commonly known as:  CREON 24   Used for:  Acute pancreatitis, unspecified complication status, unspecified pancreatitis type        Dose:  3 capsule   Take 3 capsules (72,000 Units) by mouth 3 times daily (with meals) "   Quantity:  180 capsule   Refills:  3       melatonin 3 MG tablet   Used for:  Insomnia due to medical condition        Dose:  3 mg   Take 1 tablet (3 mg) by mouth At Bedtime   Quantity:  30 tablet   Refills:  1       mirtazapine 7.5 MG Tabs tablet   Commonly known as:  REMERON   Used for:  Acute pancreatitis, unspecified complication status, unspecified pancreatitis type        Dose:  7.5 mg   Take 1 tablet (7.5 mg) by mouth At Bedtime   Quantity:  30 tablet   Refills:  3       multivitamin, therapeutic with minerals Tabs tablet   Used for:  Acute pancreatitis, unspecified complication status, unspecified pancreatitis type        Dose:  1 tablet   Take 1 tablet by mouth daily   Quantity:  30 each   Refills:  3       ondansetron 4 MG tablet   Commonly known as:  ZOFRAN   Used for:  Acute pancreatitis, unspecified complication status, unspecified pancreatitis type        Dose:  4 mg   Take 1 tablet (4 mg) by mouth every 6 hours as needed for nausea or vomiting   Quantity:  20 tablet   Refills:  1       pantoprazole 40 MG EC tablet   Commonly known as:  PROTONIX   Used for:  Acute pancreatitis, unspecified complication status, unspecified pancreatitis type        Dose:  40 mg   Take 1 tablet (40 mg) by mouth 2 times daily (before meals)   Quantity:  30 tablet   Refills:  1                Protect others around you: Learn how to safely use, store and throw away your medicines at www.disposemymeds.org.             Medication List: This is a list of all your medications and when to take them. Check marks below indicate your daily home schedule. Keep this list as a reference.      Medications           Morning Afternoon Evening Bedtime As Needed    acetaminophen 500 MG tablet   Commonly known as:  TYLENOL   Take 1-2 tablets (500-1,000 mg) by mouth every 6 hours as needed for mild pain or fever                                amylase-lipase-protease 46522 UNITS Cpep per EC capsule   Commonly known as:  CREON 24   Take 3  capsules (72,000 Units) by mouth 3 times daily (with meals)                                melatonin 3 MG tablet   Take 1 tablet (3 mg) by mouth At Bedtime                                mirtazapine 7.5 MG Tabs tablet   Commonly known as:  REMERON   Take 1 tablet (7.5 mg) by mouth At Bedtime                                multivitamin, therapeutic with minerals Tabs tablet   Take 1 tablet by mouth daily                                ondansetron 4 MG tablet   Commonly known as:  ZOFRAN   Take 1 tablet (4 mg) by mouth every 6 hours as needed for nausea or vomiting                                pantoprazole 40 MG EC tablet   Commonly known as:  PROTONIX   Take 1 tablet (40 mg) by mouth 2 times daily (before meals)

## 2017-05-15 NOTE — IP AVS SNAPSHOT
Same Day Surgery [address] [address]    Phone:  [phone]                                       After Visit Summary   5/15/2017    Jayson Bella    MRN: [id number]           After Visit Summary Signature Page     I have received my discharge instructions, and my questions have been answered. I have discussed any challenges I see with this plan with the nurse or doctor.    ..........................................................................................................................................  Patient/Patient Representative Signature      ..........................................................................................................................................  Patient Representative Print Name and Relationship to Patient    ..................................................               ................................................  Date                                            Time    ..........................................................................................................................................  Reviewed by Signature/Title    ...................................................              ..............................................  Date                                                            Time

## 2017-05-18 ENCOUNTER — CARE COORDINATION (OUTPATIENT)
Dept: GASTROENTEROLOGY | Facility: CLINIC | Age: 47
End: 2017-05-18

## 2017-05-18 DIAGNOSIS — K85.91 NECROTIZING PANCREATITIS: Primary | ICD-10-CM

## 2017-05-18 NOTE — PROGRESS NOTES
"Post ERCP (5/15/2017) with Dr. Casanova: Follow-up    Post procedure recommendations: Interval contrasted CT (local location for the patient) in one month with next step (clinic vs repeat necrotsectomy) to be then determined - Contact our team with progressive abdominal pain or fevers which will reflex noninvasive imaging     Patient states he feeling \"pretty good.\" No issues with taking in PO, denies N/V/F and pain except the first day he was a little sore but that has since resolved. He is amenable to getting his CT scan done locally by June 13th. Advised we will need the images sent to us on a disc and the final read faxed to us for review to form next steps in plan. Verbalized understanding.   Order sent to [facility] Attn: Dr. Cross, Fx: [phone]    Orders placed: CT due June 13th.     Contact information verified for future questions/concerns.    Mimi VIRAMONTES RN Coordinator  Dr. Lamb, Dr. Casanova & Dr. Chapa  Pancreas~Biliary  [phone]          "

## 2017-06-19 ENCOUNTER — CARE COORDINATION (OUTPATIENT)
Dept: GASTROENTEROLOGY | Facility: CLINIC | Age: 47
End: 2017-06-19

## 2017-06-19 NOTE — PROGRESS NOTES
Message left for Jayson to call back to confirm he is/ has gotten his CT scan done. Will need images and final read sent to our offices for review.   Contact information left for him to call back.    Mimi VIRAMONTES RN Coordinator  Dr. Lamb, Dr. Casanova & Dr. Quevedo   Pancreas~Biliary  [phone] #[phone]

## 2025-06-04 NOTE — PROGRESS NOTES
[facility]   Internal Medicine Progress Note     Patient Name: Jayson Bella   : 1970, Age: 46 year old  Admission Date: 2017   Primary Team: [facility] 3     Assessment & Plan     47 yo M with hx of gallstone pancreatitis s/p cholecystectomy in 2016 c/b biliary obstruction due to large pseudocyst s/p ERCP with stent placement 2017 who was transferred from OSH for sepsis, fever, severe leukocytosis concerning for necrotizing pancreatitis and abscess formation.    Changes today:  - 1x lasix 40 mg in PACU for hypoxemia 2/2 fluid overload. Improved O2 demand later in day  - continue 20 mg IV lasix BID, BMP+Mg BID, replete prn (started K/Mg repletion protocols)  - ok to start tube feeds  - ok to start clear liquid diet  - try 1x 25 mg prn seroquel    # necrotizing pancreatitis, previously septic  # summer-pancreatic acute fluid collections/walled off necrosis  -180, Lipase 1070, Procal 0.43-0.48, peak WBC 29.2 on  at OSH, paracentesis at OSH with counts not suggestive of peritonitis, cultures negative but no fungal cultures sent. Pleural fluid cultures from outside hospital also with NGTD but no fungal cultures sent.  - restart meropenem  - 3/1/17 CT abd w/IV contrast confirms multiple rim enhancing fluid pockets & GGOs in lung  - blood Cx collected while pt on chio & vanc (after transfer), again on evening of  after afternoon meropenem dose was held, NGTD  - diagnostic pericentesis with IR 3/2 shows green fluid, WBCs, high lipase, likely infectious, waiting on final culture results  - GI consult, appreciate recs. s/p EUS/cystogastrostomy on 3/3  - monitor for fever, hemodynamic instability, daily CBCs, QOD CRP, procal    # resolving JUAN, likely 2/2 intravascular volume depletion in setting of sepsis, with contribution from low albumin  -  baseline 0.6, peak 2.1, continues to improve despite CT contrast.  - Abd US w/ascites and normal kidneys  - monitor BMP    #  - noted to be SOB and placed on 2-3L of oxyen   hypervolemia, ascites likely secondary to IVF in setting of sepsis and hypoalbuminemia  - recent weight of 106 kg on 2/13  - strict I&O, stafford due to urinary retention. Start tamsolusin before d/c'ing stafford  - daily weights  - start diuresing again today with 20 mg IV lasix BID, check electrolytes BID and replete prn    # Severe malnutrition  - Continue TF (hold tonight for EUS/cystgastrostomy 3/3)  - pancreatic enzymes  - monitor electrolytes, replete prn    # L sided pleural effusion - chest tube removed 2/26  - per chart review, pleural fluid had 1152 nucleated cells, 42 PMNs, LDH of 183, Protein 2.9, and no growth at 3 days. However, LDH and protein were not checked in the serum in or around the date of thoracentesis. If using total protein on admission, protein ratio is 0.52 which fulfills criteria for exudative.   - chest tube removed 2/26, f/u xr without evidence of pneumo    # pain  - pt has had significant AMS with trazodone and fentanyl.  - dilaudid IV PRN available  - percocet q4h prn available, preferentially use this    FEN: NJTFs per nutrition, clear liquid  Prophylaxis:  DVT: SCDs, heparin subq    Lung: IS, OOB  GI: pantoprazole QDay  Code Status: FULL CODE  Disposition: pending further evaluation of pancreas and intervention as necessary    Patient seen and discussed with Dr. Rose Domingo MD, PhD  PGY-1, Internal Medicine  [id number]      Interval History     In procedure this AM  In PM, seen at beside in PACU. Some dyspnea with hypoxia on 8L O2 post procedure.    4 point ROS is otherwise negative including Resp, CVS, GI, .      Vitals, Exam, Data     Physical exam:  Temp:  [95.9  F (35.5  C)-97.8  F (36.6  C)] 97.7  F (36.5  C)  Heart Rate:  [80-92] 80  Resp:  [18-32] 20  BP: (117-151)/(68-92) 126/88  SpO2:  [92 %-99 %] 97 % on RA    Wt Readings from Last 2 Encounters:   03/02/17 131 kg (288 lb 11.2 oz)       Intake/Output Summary (Last 24 hours) at 03/01/17 7977  Last data filed at  03/01/17 1700   Gross per 24 hour   Intake          2350.42 ml   Output             2825 ml   Net          -474.58 ml     Physical Exam:   General: Pleasant, resting in bed, some pain  HEENT: mmm, NJ in place  Cardiac: Normal rate, regular rhythm. No m/r/g. Normal S1, S2.  Pulm: decreased at bases, otherwise clear throughout  Abd: Soft, diffusely tender, moderately distended.  Extremities: 1-2+ Bilateral LE edema, wraps in palce  Neuro: A&Ox3, no focal deficits    CBC    Recent Labs  Lab 03/03/17  0157 03/02/17  0821 03/01/17  0947 02/28/17  0637   WBC 13.4* 16.5* 21.2* 16.3*   HGB 8.4* 8.9* 9.4* 10.3*   MCV 83 83 83 83   * 467* 477* 538*       BMP    Recent Labs  Lab 03/02/17  0821 03/01/17  0947 02/28/17  0637 02/27/17  0626 02/26/17  0601    137 138 143 141   POTASSIUM 3.6 3.8 4.2 4.0 3.9   CHLORIDE 105 102 103 108 107   CO2 28 30 29 28 29   ANIONGAP 11 6 6 7 5   * 122* 120* 92 87   BUN 36* 40* 36* 33* 33*   CR 1.13 1.28* 1.44* 1.68* 1.83*   YAN 7.3* 7.5* 7.2* 7.5* 7.2*   MAG 1.7 1.8 1.8 2.0 1.9   PHOS  --   --  3.0 3.9 3.9        INR    Recent Labs  Lab 03/03/17  0157   INR 1.14       Liver panel    Recent Labs  Lab 03/02/17  0821 03/01/17  0947 02/28/17  0637 02/25/17  2206   PROTTOTAL  --  6.1* 5.8* 5.6*   ALBUMIN 1.2* 1.3* 1.2* 1.4*   BILITOTAL  --  0.3  --  0.4   ALKPHOS  --  87  --  90   AST  --  20 31 30   ALT  --  15 16 25     Imaging, CT abd w/contrast 3/1/17:  1. Necrotizing pancreatitis with acute pancreatic collections/walled  off necrosis in the body and the tail of the pancreas. There are  additional rim-enhancing walled off necrosis within the abdomen, at  least one new from prior, , none of these collections contain gas.  2. Moderate left pleural effusion with interval removal of the left  basilar chest tube.  3. Groundglass opacities in the periphery of the right middle lobe  and lingula, concerning for infection versus aspiration.  4. Bibasilar atelectasis/consolidation.

## (undated) DEVICE — WIRE GUIDE 0.025"X450CM ANG VISIGLIDE G-240-2545A

## (undated) DEVICE — ENDO TUBING CO2 SMARTCAP STERILE DISP 100145CO2EXT

## (undated) DEVICE — CATH RETRIEVAL BALLOON EXTRACTOR PRO RX-S INJ ABOVE 9-12MM

## (undated) DEVICE — ENDO BITE BLOCK ADULT OMNI-BLOC

## (undated) DEVICE — DRAPE SHEET MED 44X70" 9355

## (undated) DEVICE — CLIP HEMOCLIP ENDOSCOPIC INSTINCT 2.8X230CM INSC-7-230-SS

## (undated) DEVICE — LABEL MEDICATION SYSTEM 3303-P

## (undated) DEVICE — GUIDEWIRE TERUMO .035X260 3CM STR TIP GS3504

## (undated) DEVICE — SOL WATER IRRIG 1000ML BOTTLE 2F7114

## (undated) DEVICE — ENDO CAP AND TUBING STERILE FOR ENDOGATOR  100130

## (undated) DEVICE — TUBE NASOGASTRIC FEEDING OVER WIRE 12FRX22" CORFLO 30-4602

## (undated) DEVICE — ENDO CONNECTOR ENDOGATOR AUX WATER JET FOR OLYMPUS SCOPE

## (undated) DEVICE — SYR 50ML CATH TIP W/O NDL 309620

## (undated) DEVICE — ENDO SNARE POLYPECTOMY OVAL 15MM LOOP SD-240U-15

## (undated) DEVICE — ESU GROUND PAD ADULT W/CORD E7507

## (undated) DEVICE — GLOVE SENSICARE PI POWDER FREE 8.0 LATEX FREE MSG9080

## (undated) DEVICE — KIT ENDO FIRST STEP DISINFECTANT 200ML W/POUCH EP-4

## (undated) DEVICE — TAPE DURAPORE 3" SILK 1538-3

## (undated) DEVICE — GLOVE SENSICARE PI POWDER FREE 7.5 LATEX FREE MSG9075

## (undated) DEVICE — BIOPSY VALVE BIOSHIELD 00711135

## (undated) DEVICE — PACK ENDOSCOPY GI CUSTOM UMMC

## (undated) DEVICE — ENDO FUSION OMNI-TOME G31903

## (undated) DEVICE — WIRE GUIDE 0.025"X450CM STR VISIGLIDE G-240-2545S

## (undated) DEVICE — ENDO DEVICE LOCKING AND BIOPSY CAP M00545261

## (undated) DEVICE — SNARE CAPIVATOR II 15MM M00561232

## (undated) DEVICE — SYR 30ML SLIP TIP W/O NDL 302833

## (undated) DEVICE — SUCTION MANIFOLD DORNOCH ULTRA CART UL-CL500

## (undated) DEVICE — ENDO TUBING INSUFFLATOR CO2 EFFICIENT 710324

## (undated) DEVICE — CATH BALLOON ELATION ESOPH/PYL/COL 15-16.5-18MMX240CM EPCB15

## (undated) DEVICE — PAD CHUX UNDERPAD 23X24" 7136

## (undated) DEVICE — INFLATION DEVICE BIG 60 ENDO-AN6012

## (undated) DEVICE — LINEN TOWEL PACK X5 5464

## (undated) DEVICE — PAD CHUX UNDERPAD 30X30"

## (undated) DEVICE — GUIDEWIRE ENDOSCOPIC .035"X400CM STR TIP EWR4035

## (undated) DEVICE — ENDO CATH BALLOON EXTRACTOR PRO RX 09-12MM 4700

## (undated) DEVICE — SPECIMEN TRAP MUCOUS 40ML LUKI C30200A

## (undated) DEVICE — ENDO ADPT CATH ROTATABLE SIDE ARM G22677

## (undated) DEVICE — WIRE GUIDE 0.025"X270CM STR VISIGLIDE G-240-2527S

## (undated) DEVICE — ENDO NDL ASPIRATION 19GA FLEX SLIMLINE EXPECT EUS M00555530

## (undated) DEVICE — CATH BALLOON ELATION ESOPH/PYLORIC 12-13.5-15MMX180CM EPB12

## (undated) DEVICE — STENT BILIARY ZIMMON DOUBLE PIGTAIL 7FRX2CM G22438
Type: IMPLANTABLE DEVICE | Site: STOMACH | Status: NON-FUNCTIONAL
Removed: 2017-03-23

## (undated) DEVICE — PREP CHLORAPREP 26ML TINTED ORANGE  260815

## (undated) DEVICE — CATH RETRIEVAL BALLOON EXTRACTOR PRO RX-S INJ ABOVE 12-15MM

## (undated) DEVICE — WIRE GUIDE 0.025"X270CM ANG VISIGLIDE G-240-2527A

## (undated) RX ORDER — FUROSEMIDE 10 MG/ML
INJECTION INTRAMUSCULAR; INTRAVENOUS
Status: DISPENSED
Start: 2017-03-03

## (undated) RX ORDER — FENTANYL CITRATE 50 UG/ML
INJECTION, SOLUTION INTRAMUSCULAR; INTRAVENOUS
Status: DISPENSED
Start: 2017-03-15

## (undated) RX ORDER — HYDROMORPHONE HYDROCHLORIDE 1 MG/ML
INJECTION, SOLUTION INTRAMUSCULAR; INTRAVENOUS; SUBCUTANEOUS
Status: DISPENSED
Start: 2017-03-03

## (undated) RX ORDER — SODIUM CHLORIDE, SODIUM LACTATE, POTASSIUM CHLORIDE, CALCIUM CHLORIDE 600; 310; 30; 20 MG/100ML; MG/100ML; MG/100ML; MG/100ML
INJECTION, SOLUTION INTRAVENOUS
Status: DISPENSED
Start: 2017-03-03